# Patient Record
Sex: FEMALE | Race: WHITE | NOT HISPANIC OR LATINO
[De-identification: names, ages, dates, MRNs, and addresses within clinical notes are randomized per-mention and may not be internally consistent; named-entity substitution may affect disease eponyms.]

---

## 2017-01-30 ENCOUNTER — APPOINTMENT (OUTPATIENT)
Dept: UROLOGY | Facility: CLINIC | Age: 82
End: 2017-01-30

## 2017-03-04 ENCOUNTER — RX RENEWAL (OUTPATIENT)
Age: 82
End: 2017-03-04

## 2017-03-08 ENCOUNTER — APPOINTMENT (OUTPATIENT)
Dept: UROLOGY | Facility: CLINIC | Age: 82
End: 2017-03-08

## 2017-03-08 VITALS — HEART RATE: 69 BPM | DIASTOLIC BLOOD PRESSURE: 74 MMHG | SYSTOLIC BLOOD PRESSURE: 171 MMHG | TEMPERATURE: 98 F

## 2017-03-08 DIAGNOSIS — N39.41 URGE INCONTINENCE: ICD-10-CM

## 2017-03-09 LAB
APPEARANCE: CLEAR
BACTERIA: ABNORMAL
BILIRUBIN URINE: NEGATIVE
BLOOD URINE: NEGATIVE
COLOR: ABNORMAL
CORE LAB FLUID CYTOLOGY: NORMAL
GLUCOSE QUALITATIVE U: NORMAL MG/DL
HYALINE CASTS: 2 /LPF
KETONES URINE: NEGATIVE
LEUKOCYTE ESTERASE URINE: ABNORMAL
MICROSCOPIC-UA: NORMAL
NITRITE URINE: NEGATIVE
PH URINE: 7
PROTEIN URINE: NEGATIVE MG/DL
RED BLOOD CELLS URINE: 1 /HPF
SPECIFIC GRAVITY URINE: 1.01
SQUAMOUS EPITHELIAL CELLS: 12 /HPF
UROBILINOGEN URINE: NORMAL MG/DL
WHITE BLOOD CELLS URINE: 6 /HPF

## 2017-03-10 LAB — BACTERIA UR CULT: ABNORMAL

## 2017-04-18 ENCOUNTER — APPOINTMENT (OUTPATIENT)
Dept: FAMILY MEDICINE | Facility: CLINIC | Age: 82
End: 2017-04-18

## 2017-04-18 VITALS
DIASTOLIC BLOOD PRESSURE: 90 MMHG | WEIGHT: 163.5 LBS | HEIGHT: 62.5 IN | BODY MASS INDEX: 29.33 KG/M2 | SYSTOLIC BLOOD PRESSURE: 164 MMHG

## 2017-04-18 VITALS — DIASTOLIC BLOOD PRESSURE: 80 MMHG | SYSTOLIC BLOOD PRESSURE: 140 MMHG

## 2017-04-18 RX ORDER — OMEPRAZOLE 40 MG/1
40 CAPSULE, DELAYED RELEASE ORAL
Refills: 0 | Status: COMPLETED | COMMUNITY

## 2017-04-18 RX ORDER — METOPROLOL TARTRATE 25 MG/1
25 TABLET, FILM COATED ORAL
Refills: 0 | Status: COMPLETED | COMMUNITY

## 2017-04-18 RX ORDER — RANITIDINE HYDROCHLORIDE 300 MG/1
300 CAPSULE ORAL
Refills: 0 | Status: COMPLETED | COMMUNITY

## 2017-04-19 LAB
B PERT IGG+IGM PNL SER: ABNORMAL
COLOR FLD: NORMAL
FLUID INTAKE SUBSTANCE CLASS: NORMAL
LYMPHOCYTES # FLD MANUAL: 11 %
MONOS+MACROS NFR FLD MANUAL: 18 %
NEUTS SEG # FLD MANUAL: 71 %
RBC # FLD MANUAL: ABNORMAL /UL
TOTAL CELLS COUNTED FLD: 9230 /UL
TUBE TYPE: NORMAL

## 2017-04-25 LAB — BACTERIA FLD CULT: NORMAL

## 2017-06-01 ENCOUNTER — RX RENEWAL (OUTPATIENT)
Age: 82
End: 2017-06-01

## 2017-09-26 ENCOUNTER — NON-APPOINTMENT (OUTPATIENT)
Age: 82
End: 2017-09-26

## 2017-09-26 ENCOUNTER — APPOINTMENT (OUTPATIENT)
Dept: FAMILY MEDICINE | Facility: CLINIC | Age: 82
End: 2017-09-26
Payer: MEDICARE

## 2017-09-26 VITALS
HEART RATE: 70 BPM | HEIGHT: 62.5 IN | DIASTOLIC BLOOD PRESSURE: 80 MMHG | RESPIRATION RATE: 16 BRPM | SYSTOLIC BLOOD PRESSURE: 152 MMHG | WEIGHT: 163.13 LBS | BODY MASS INDEX: 29.27 KG/M2 | OXYGEN SATURATION: 97 %

## 2017-09-26 VITALS — RESPIRATION RATE: 16 BRPM | DIASTOLIC BLOOD PRESSURE: 80 MMHG | SYSTOLIC BLOOD PRESSURE: 140 MMHG | HEART RATE: 72 BPM

## 2017-09-26 DIAGNOSIS — R26.2 DIFFICULTY IN WALKING, NOT ELSEWHERE CLASSIFIED: ICD-10-CM

## 2017-09-26 DIAGNOSIS — R12 HEARTBURN: ICD-10-CM

## 2017-09-26 DIAGNOSIS — M67.441 GANGLION, RIGHT HAND: ICD-10-CM

## 2017-09-26 DIAGNOSIS — Z01.818 ENCOUNTER FOR OTHER PREPROCEDURAL EXAMINATION: ICD-10-CM

## 2017-09-26 DIAGNOSIS — R35.1 NOCTURIA: ICD-10-CM

## 2017-09-26 DIAGNOSIS — F17.200 NICOTINE DEPENDENCE, UNSPECIFIED, UNCOMPLICATED: ICD-10-CM

## 2017-09-26 DIAGNOSIS — R32 UNSPECIFIED URINARY INCONTINENCE: ICD-10-CM

## 2017-09-26 DIAGNOSIS — Z78.9 OTHER SPECIFIED HEALTH STATUS: ICD-10-CM

## 2017-09-26 DIAGNOSIS — H04.123 DRY EYE SYNDROME OF BILATERAL LACRIMAL GLANDS: ICD-10-CM

## 2017-09-26 PROCEDURE — 99214 OFFICE O/P EST MOD 30 MIN: CPT | Mod: 25

## 2017-09-26 PROCEDURE — 93000 ELECTROCARDIOGRAM COMPLETE: CPT

## 2017-09-29 ENCOUNTER — RESULT REVIEW (OUTPATIENT)
Age: 82
End: 2017-09-29

## 2017-10-11 ENCOUNTER — RX RENEWAL (OUTPATIENT)
Age: 82
End: 2017-10-11

## 2017-10-13 ENCOUNTER — RX RENEWAL (OUTPATIENT)
Age: 82
End: 2017-10-13

## 2017-11-13 ENCOUNTER — NON-APPOINTMENT (OUTPATIENT)
Age: 82
End: 2017-11-13

## 2017-11-13 ENCOUNTER — APPOINTMENT (OUTPATIENT)
Dept: FAMILY MEDICINE | Facility: CLINIC | Age: 82
End: 2017-11-13
Payer: MEDICARE

## 2017-11-13 VITALS
WEIGHT: 161.38 LBS | DIASTOLIC BLOOD PRESSURE: 80 MMHG | HEIGHT: 62.5 IN | BODY MASS INDEX: 28.95 KG/M2 | SYSTOLIC BLOOD PRESSURE: 140 MMHG

## 2017-11-13 VITALS — DIASTOLIC BLOOD PRESSURE: 80 MMHG | HEART RATE: 72 BPM | SYSTOLIC BLOOD PRESSURE: 140 MMHG | RESPIRATION RATE: 16 BRPM

## 2017-11-13 DIAGNOSIS — I34.0 NONRHEUMATIC MITRAL (VALVE) INSUFFICIENCY: ICD-10-CM

## 2017-11-13 PROCEDURE — 36415 COLL VENOUS BLD VENIPUNCTURE: CPT

## 2017-11-13 PROCEDURE — 93000 ELECTROCARDIOGRAM COMPLETE: CPT

## 2017-11-13 PROCEDURE — 99214 OFFICE O/P EST MOD 30 MIN: CPT | Mod: 25

## 2017-11-14 LAB
ALBUMIN SERPL ELPH-MCNC: 4.2 G/DL
ALP BLD-CCNC: 88 U/L
ALT SERPL-CCNC: 7 U/L
ANION GAP SERPL CALC-SCNC: 15 MMOL/L
APPEARANCE: CLEAR
AST SERPL-CCNC: 21 U/L
BACTERIA: ABNORMAL
BASOPHILS # BLD AUTO: 0.05 K/UL
BASOPHILS NFR BLD AUTO: 0.6 %
BILIRUB SERPL-MCNC: 0.3 MG/DL
BILIRUBIN URINE: NEGATIVE
BLOOD URINE: ABNORMAL
BUN SERPL-MCNC: 11 MG/DL
CALCIUM SERPL-MCNC: 9.6 MG/DL
CHLORIDE SERPL-SCNC: 95 MMOL/L
CHOLEST SERPL-MCNC: 228 MG/DL
CHOLEST/HDLC SERPL: 4.7 RATIO
CO2 SERPL-SCNC: 24 MMOL/L
COLOR: YELLOW
CREAT SERPL-MCNC: 0.95 MG/DL
CRP SERPL-MCNC: 1.2 MG/DL
EOSINOPHIL # BLD AUTO: 0.42 K/UL
EOSINOPHIL NFR BLD AUTO: 5 %
ERYTHROCYTE [SEDIMENTATION RATE] IN BLOOD BY WESTERGREN METHOD: 44 MM/HR
GLUCOSE QUALITATIVE U: NEGATIVE MG/DL
GLUCOSE SERPL-MCNC: 69 MG/DL
HCT VFR BLD CALC: 36.5 %
HDLC SERPL-MCNC: 49 MG/DL
HGB BLD-MCNC: 12.1 G/DL
HYALINE CASTS: 0 /LPF
IMM GRANULOCYTES NFR BLD AUTO: 0.1 %
KETONES URINE: NEGATIVE
LDLC SERPL CALC-MCNC: 142 MG/DL
LEUKOCYTE ESTERASE URINE: ABNORMAL
LYMPHOCYTES # BLD AUTO: 1.59 K/UL
LYMPHOCYTES NFR BLD AUTO: 19.1 %
MAN DIFF?: NORMAL
MCHC RBC-ENTMCNC: 30 PG
MCHC RBC-ENTMCNC: 33.2 GM/DL
MCV RBC AUTO: 90.3 FL
MICROSCOPIC-UA: NORMAL
MONOCYTES # BLD AUTO: 0.77 K/UL
MONOCYTES NFR BLD AUTO: 9.2 %
NEUTROPHILS # BLD AUTO: 5.49 K/UL
NEUTROPHILS NFR BLD AUTO: 66 %
NITRITE URINE: NEGATIVE
PH URINE: 6
PLATELET # BLD AUTO: 345 K/UL
POTASSIUM SERPL-SCNC: 4.7 MMOL/L
PROT SERPL-MCNC: 7.6 G/DL
PROTEIN URINE: NEGATIVE MG/DL
RBC # BLD: 4.04 M/UL
RBC # FLD: 13.7 %
RED BLOOD CELLS URINE: 2 /HPF
SODIUM SERPL-SCNC: 134 MMOL/L
SPECIFIC GRAVITY URINE: 1.01
SQUAMOUS EPITHELIAL CELLS: >27 /HPF
TRIGL SERPL-MCNC: 184 MG/DL
URATE SERPL-MCNC: 4.6 MG/DL
UROBILINOGEN URINE: NEGATIVE MG/DL
WBC # FLD AUTO: 8.33 K/UL
WHITE BLOOD CELLS URINE: 6 /HPF

## 2017-11-15 ENCOUNTER — APPOINTMENT (OUTPATIENT)
Dept: CARDIOLOGY | Facility: CLINIC | Age: 82
End: 2017-11-15
Payer: MEDICARE

## 2017-11-15 VITALS
SYSTOLIC BLOOD PRESSURE: 146 MMHG | BODY MASS INDEX: 28.95 KG/M2 | WEIGHT: 161.38 LBS | HEART RATE: 69 BPM | DIASTOLIC BLOOD PRESSURE: 82 MMHG | OXYGEN SATURATION: 96 % | HEIGHT: 62.5 IN

## 2017-11-15 DIAGNOSIS — R06.09 OTHER FORMS OF DYSPNEA: ICD-10-CM

## 2017-11-15 LAB
T4 SERPL-MCNC: 7.9 UG/DL
TSH SERPL-ACNC: 13.58 UIU/ML

## 2017-11-15 PROCEDURE — 99205 OFFICE O/P NEW HI 60 MIN: CPT

## 2017-11-15 PROCEDURE — 93306 TTE W/DOPPLER COMPLETE: CPT

## 2017-11-24 ENCOUNTER — TRANSCRIPTION ENCOUNTER (OUTPATIENT)
Age: 82
End: 2017-11-24

## 2017-11-25 ENCOUNTER — TRANSCRIPTION ENCOUNTER (OUTPATIENT)
Age: 82
End: 2017-11-25

## 2017-12-01 ENCOUNTER — RESULT REVIEW (OUTPATIENT)
Age: 82
End: 2017-12-01

## 2017-12-05 ENCOUNTER — APPOINTMENT (OUTPATIENT)
Dept: CARDIOLOGY | Facility: CLINIC | Age: 82
End: 2017-12-05
Payer: MEDICARE

## 2017-12-05 ENCOUNTER — NON-APPOINTMENT (OUTPATIENT)
Age: 82
End: 2017-12-05

## 2017-12-05 VITALS
BODY MASS INDEX: 27.81 KG/M2 | HEART RATE: 61 BPM | HEIGHT: 62.5 IN | OXYGEN SATURATION: 98 % | DIASTOLIC BLOOD PRESSURE: 74 MMHG | SYSTOLIC BLOOD PRESSURE: 156 MMHG | WEIGHT: 155 LBS

## 2017-12-05 DIAGNOSIS — I50.1 LEFT VENTRICULAR FAILURE, UNSPECIFIED: ICD-10-CM

## 2017-12-05 PROCEDURE — 99203 OFFICE O/P NEW LOW 30 MIN: CPT

## 2017-12-05 PROCEDURE — 93000 ELECTROCARDIOGRAM COMPLETE: CPT

## 2017-12-12 ENCOUNTER — RX RENEWAL (OUTPATIENT)
Age: 82
End: 2017-12-12

## 2017-12-13 PROBLEM — R06.09 DOE (DYSPNEA ON EXERTION): Status: ACTIVE | Noted: 2017-12-13

## 2018-01-08 ENCOUNTER — OUTPATIENT (OUTPATIENT)
Dept: OUTPATIENT SERVICES | Facility: HOSPITAL | Age: 83
LOS: 1 days | End: 2018-01-08
Payer: MEDICARE

## 2018-01-08 VITALS
TEMPERATURE: 98 F | SYSTOLIC BLOOD PRESSURE: 159 MMHG | OXYGEN SATURATION: 98 % | HEART RATE: 91 BPM | RESPIRATION RATE: 18 BRPM | DIASTOLIC BLOOD PRESSURE: 74 MMHG | HEIGHT: 62 IN | WEIGHT: 154.98 LBS

## 2018-01-08 DIAGNOSIS — Z90.89 ACQUIRED ABSENCE OF OTHER ORGANS: Chronic | ICD-10-CM

## 2018-01-08 DIAGNOSIS — H26.9 UNSPECIFIED CATARACT: Chronic | ICD-10-CM

## 2018-01-08 DIAGNOSIS — Z90.49 ACQUIRED ABSENCE OF OTHER SPECIFIED PARTS OF DIGESTIVE TRACT: Chronic | ICD-10-CM

## 2018-01-08 DIAGNOSIS — Z01.810 ENCOUNTER FOR PREPROCEDURAL CARDIOVASCULAR EXAMINATION: ICD-10-CM

## 2018-01-08 DIAGNOSIS — I35.0 NONRHEUMATIC AORTIC (VALVE) STENOSIS: ICD-10-CM

## 2018-01-08 DIAGNOSIS — Z98.890 OTHER SPECIFIED POSTPROCEDURAL STATES: Chronic | ICD-10-CM

## 2018-01-08 LAB
ANION GAP SERPL CALC-SCNC: 13 MMOL/L — SIGNIFICANT CHANGE UP (ref 5–17)
APTT BLD: 28.9 SEC — SIGNIFICANT CHANGE UP (ref 27.5–37.4)
BASOPHILS # BLD AUTO: 0 K/UL — SIGNIFICANT CHANGE UP (ref 0–0.2)
BASOPHILS NFR BLD AUTO: 0.3 % — SIGNIFICANT CHANGE UP (ref 0–2)
BUN SERPL-MCNC: 12 MG/DL — SIGNIFICANT CHANGE UP (ref 8–20)
CALCIUM SERPL-MCNC: 9.5 MG/DL — SIGNIFICANT CHANGE UP (ref 8.6–10.2)
CHLORIDE SERPL-SCNC: 97 MMOL/L — LOW (ref 98–107)
CO2 SERPL-SCNC: 24 MMOL/L — SIGNIFICANT CHANGE UP (ref 22–29)
CREAT SERPL-MCNC: 0.74 MG/DL — SIGNIFICANT CHANGE UP (ref 0.5–1.3)
EOSINOPHIL # BLD AUTO: 0.1 K/UL — SIGNIFICANT CHANGE UP (ref 0–0.5)
EOSINOPHIL NFR BLD AUTO: 1.4 % — SIGNIFICANT CHANGE UP (ref 0–6)
GLUCOSE SERPL-MCNC: 95 MG/DL — SIGNIFICANT CHANGE UP (ref 70–115)
HCT VFR BLD CALC: 35.9 % — LOW (ref 37–47)
HGB BLD-MCNC: 12.2 G/DL — SIGNIFICANT CHANGE UP (ref 12–16)
INR BLD: 0.95 RATIO — SIGNIFICANT CHANGE UP (ref 0.88–1.16)
LYMPHOCYTES # BLD AUTO: 1 K/UL — SIGNIFICANT CHANGE UP (ref 1–4.8)
LYMPHOCYTES # BLD AUTO: 14.3 % — LOW (ref 20–55)
MCHC RBC-ENTMCNC: 29.5 PG — SIGNIFICANT CHANGE UP (ref 27–31)
MCHC RBC-ENTMCNC: 34 G/DL — SIGNIFICANT CHANGE UP (ref 32–36)
MCV RBC AUTO: 86.7 FL — SIGNIFICANT CHANGE UP (ref 81–99)
MONOCYTES # BLD AUTO: 0.6 K/UL — SIGNIFICANT CHANGE UP (ref 0–0.8)
MONOCYTES NFR BLD AUTO: 8.5 % — SIGNIFICANT CHANGE UP (ref 3–10)
NEUTROPHILS # BLD AUTO: 5.5 K/UL — SIGNIFICANT CHANGE UP (ref 1.8–8)
NEUTROPHILS NFR BLD AUTO: 75.4 % — HIGH (ref 37–73)
PLATELET # BLD AUTO: 281 K/UL — SIGNIFICANT CHANGE UP (ref 150–400)
POTASSIUM SERPL-MCNC: 4.3 MMOL/L — SIGNIFICANT CHANGE UP (ref 3.5–5.3)
POTASSIUM SERPL-SCNC: 4.3 MMOL/L — SIGNIFICANT CHANGE UP (ref 3.5–5.3)
PROTHROM AB SERPL-ACNC: 10.4 SEC — SIGNIFICANT CHANGE UP (ref 9.8–12.7)
RBC # BLD: 4.14 M/UL — LOW (ref 4.4–5.2)
RBC # FLD: 13.3 % — SIGNIFICANT CHANGE UP (ref 11–15.6)
SODIUM SERPL-SCNC: 134 MMOL/L — LOW (ref 135–145)
WBC # BLD: 7.3 K/UL — SIGNIFICANT CHANGE UP (ref 4.8–10.8)
WBC # FLD AUTO: 7.3 K/UL — SIGNIFICANT CHANGE UP (ref 4.8–10.8)

## 2018-01-08 PROCEDURE — 93010 ELECTROCARDIOGRAM REPORT: CPT

## 2018-01-08 PROCEDURE — 93005 ELECTROCARDIOGRAM TRACING: CPT

## 2018-01-08 PROCEDURE — 85730 THROMBOPLASTIN TIME PARTIAL: CPT

## 2018-01-08 PROCEDURE — 85610 PROTHROMBIN TIME: CPT

## 2018-01-08 PROCEDURE — G0463: CPT

## 2018-01-08 PROCEDURE — 36415 COLL VENOUS BLD VENIPUNCTURE: CPT

## 2018-01-08 PROCEDURE — 85027 COMPLETE CBC AUTOMATED: CPT

## 2018-01-08 PROCEDURE — 80048 BASIC METABOLIC PNL TOTAL CA: CPT

## 2018-01-08 NOTE — H&P PST ADULT - PSH
Bilateral cataracts    H/O brain surgery  subdural  bleed,  had  chris holes  History of cholecystectomy    History of tonsillectomy

## 2018-01-08 NOTE — ASU PATIENT PROFILE, ADULT - PMH
Actinic keratosis    Aortic valve stenosis    Back pain    Dehydration    Dry eyes    Fatigue    GERD (gastroesophageal reflux disease)    Hard of hearing    HLD (hyperlipidemia)    HTN (hypertension)    Hypothyroid    Intraventricular block    MR (mitral regurgitation)    Nocturia    Osteoarthritis of back    Subdural hematoma    Syncope and collapse    Urine incontinence

## 2018-01-08 NOTE — H&P PST ADULT - FALL HARM RISK
has adaptive equipment in home, taught back well understanding of home safety/bones(Osteoporosis,prev fx,steroid use,metastatic bone ca

## 2018-01-08 NOTE — ASU PATIENT PROFILE, ADULT - FALL HARM RISK
bones(Osteoporosis,prev fx,steroid use,metastatic bone ca/has adaptive equipment in home, taught back well understanding of home safety

## 2018-01-08 NOTE — H&P PST ADULT - HISTORY OF PRESENT ILLNESS
85 yo female with murmur since childhood recently diagnosed with severe aortic stenosis.  Complaints of lethargy, SOB NYHA 3/4 denies chest pain. Dr Ford  referred for TAVR consult.  Echo revealed moderate MR and severe AS EDUIN +1cm2.  History also includes hypertension, hyperlipidemia, hypothyroidism traumatic subarachnoid hemorrhage after a fall in 2015 which required drainage.  Patient presents for PST for cardiac cath to r/o CAD for TAVR clearance.

## 2018-01-11 ENCOUNTER — TRANSCRIPTION ENCOUNTER (OUTPATIENT)
Age: 83
End: 2018-01-11

## 2018-01-11 ENCOUNTER — OUTPATIENT (OUTPATIENT)
Dept: OUTPATIENT SERVICES | Facility: HOSPITAL | Age: 83
LOS: 1 days | Discharge: HOME | End: 2018-01-11
Payer: MEDICARE

## 2018-01-11 VITALS
HEART RATE: 71 BPM | TEMPERATURE: 98 F | OXYGEN SATURATION: 98 % | RESPIRATION RATE: 20 BRPM | DIASTOLIC BLOOD PRESSURE: 70 MMHG | SYSTOLIC BLOOD PRESSURE: 157 MMHG

## 2018-01-11 VITALS
OXYGEN SATURATION: 96 % | SYSTOLIC BLOOD PRESSURE: 145 MMHG | RESPIRATION RATE: 16 BRPM | DIASTOLIC BLOOD PRESSURE: 67 MMHG | HEART RATE: 79 BPM

## 2018-01-11 DIAGNOSIS — I25.10 ATHEROSCLEROTIC HEART DISEASE OF NATIVE CORONARY ARTERY WITHOUT ANGINA PECTORIS: ICD-10-CM

## 2018-01-11 DIAGNOSIS — Z98.890 OTHER SPECIFIED POSTPROCEDURAL STATES: Chronic | ICD-10-CM

## 2018-01-11 DIAGNOSIS — Z90.49 ACQUIRED ABSENCE OF OTHER SPECIFIED PARTS OF DIGESTIVE TRACT: Chronic | ICD-10-CM

## 2018-01-11 DIAGNOSIS — Z90.89 ACQUIRED ABSENCE OF OTHER ORGANS: Chronic | ICD-10-CM

## 2018-01-11 DIAGNOSIS — I35.0 NONRHEUMATIC AORTIC (VALVE) STENOSIS: ICD-10-CM

## 2018-01-11 DIAGNOSIS — Z01.810 ENCOUNTER FOR PREPROCEDURAL CARDIOVASCULAR EXAMINATION: ICD-10-CM

## 2018-01-11 DIAGNOSIS — E03.9 HYPOTHYROIDISM, UNSPECIFIED: ICD-10-CM

## 2018-01-11 DIAGNOSIS — H26.9 UNSPECIFIED CATARACT: Chronic | ICD-10-CM

## 2018-01-11 DIAGNOSIS — K21.9 GASTRO-ESOPHAGEAL REFLUX DISEASE WITHOUT ESOPHAGITIS: ICD-10-CM

## 2018-01-11 PROCEDURE — 93567 NJX CAR CTH SPRVLV AORTGRPHY: CPT

## 2018-01-11 PROCEDURE — C1769: CPT

## 2018-01-11 PROCEDURE — C1887: CPT

## 2018-01-11 PROCEDURE — 93460 R&L HRT ART/VENTRICLE ANGIO: CPT

## 2018-01-11 PROCEDURE — 99221 1ST HOSP IP/OBS SF/LOW 40: CPT

## 2018-01-11 PROCEDURE — C1894: CPT

## 2018-01-11 PROCEDURE — C1889: CPT

## 2018-01-11 PROCEDURE — 93460 R&L HRT ART/VENTRICLE ANGIO: CPT | Mod: 26

## 2018-01-11 RX ORDER — ATROPINE SULFATE 0.1 MG/ML
0.5 SYRINGE (ML) INJECTION ONCE
Qty: 0 | Refills: 0 | Status: COMPLETED | OUTPATIENT
Start: 2018-01-11 | End: 2018-01-11

## 2018-01-11 RX ORDER — SODIUM CHLORIDE 9 MG/ML
1000 INJECTION INTRAMUSCULAR; INTRAVENOUS; SUBCUTANEOUS
Qty: 0 | Refills: 0 | Status: DISCONTINUED | OUTPATIENT
Start: 2018-01-11 | End: 2018-01-26

## 2018-01-11 RX ORDER — SODIUM CHLORIDE 9 MG/ML
300 INJECTION INTRAMUSCULAR; INTRAVENOUS; SUBCUTANEOUS
Qty: 0 | Refills: 0 | Status: DISCONTINUED | OUTPATIENT
Start: 2018-01-11 | End: 2018-01-26

## 2018-01-11 RX ADMIN — Medication 0.5 MILLIGRAM(S): at 13:15

## 2018-01-11 RX ADMIN — SODIUM CHLORIDE 300 MILLILITER(S): 9 INJECTION INTRAMUSCULAR; INTRAVENOUS; SUBCUTANEOUS at 16:40

## 2018-01-11 NOTE — CONSULT NOTE ADULT - ASSESSMENT
87 yo Female with PMH HTN, HLD, Hypothyroid, Traumatic SAH 2015. Pt had a recent TTE with evidence of severe AS mean AV gradient of 43mmHg, Mild AI, MAC and moderate MR. Pt c/o SOB with exertion and lethargy for several weeks now. Pt underwent Cardiac Cath today which showed 80% stenosis of LAD and Severe AS with a valve gradient of 60mmHg. Pt to be evaluated for TAVR work up and possible PCI stent to LAD.   Case discussed with Dr. Castillo Pt will need a TAVR most likely. Pt to be evaluated by Dr. dave in office on 1/18/18 at 12:30pm. Pt given appointment and explained if her SOB worsens or she begins to have chest pain or Syncope/light handedness she is to come in to ED immediately

## 2018-01-11 NOTE — CONSULT NOTE ADULT - SUBJECTIVE AND OBJECTIVE BOX
Surgeon: Jaelyn    Consult requesting by: Dr He     HISTORY OF PRESENT ILLNESS:  87 yo Female with PMH HTN, HLD, Hypothyroid, Traumatic SAH 2015. Pt had a recent TTE with evidence of severe AS and moderate MR. Pt c/o SOB and lethargy. Pt underwent Cardiac Cath today which showed 80% stenosis of LAD and Severe AS with a gradient of 60mmHg.     PAST MEDICAL & SURGICAL HISTORY:  Osteoarthritis of back  Urine incontinence  Nocturia  MR (mitral regurgitation)  Hypothyroid  Dehydration  Syncope and collapse  GERD (gastroesophageal reflux disease)  HTN (hypertension)  HLD (hyperlipidemia)  Fatigue  Dry eyes  Subdural hematoma  Hard of hearing  Back pain  Aortic valve stenosis  Actinic keratosis  Intraventricular block  History of cholecystectomy  History of tonsillectomy  Bilateral cataracts  H/O brain surgery: subdural  bleed,  had  chris holes      MEDICATIONS  (STANDING):  atropine Syringe 0.5 milliGRAM(s) IV Push once  sodium chloride 0.9%. 1000 milliLiter(s) (30 mL/Hr) IV Continuous <Continuous>  sodium chloride 0.9%. 300 milliLiter(s) (300 mL/Hr) IV Continuous <Continuous>    MEDICATIONS  (PRN):    Antiplatelet therapy:                           Last dose/amt:    Allergies    No Known Allergies    Intolerances        SOCIAL HISTORY:  Smoker: [ ] Yes  [ ] No        PACK YEARS:                         WHEN QUIT?  ETOH use: [ ] Yes  [ ] No              FREQUENCY / QUANTITY:  Ilicit Drug use:  [ ] Yes  [ ] No  Occupation:  Live with:  Assisted device use:    FAMILY HISTORY:      Review of Systems  CONSTITUTIONAL:  Fevers[ ] chills[ ] sweats[ ] fatigue[ ] weight loss[ ] weight gain [ ]                                     NEGATIVE [ ]   NEURO:  parathesias[ ] seizures [ ]  syncope [ ]  confusion [ ]                                                                                NEGATIVE[ ]   EYES: glasses[ ]  blurry vision[ ]  discharge[ ] pain[ ] glaucoma [ ]                                                                          NEGATIVE[ ]   ENMT:  difficulty hearing [ ]  vertigo[ ]  dysphagia[ ] epistaxis[ ] recent dental work [ ]                                    NEGATIVE[ ]   CV:  chest pain[ ] palpitations[ ] WRIGHT [ ] diaphoresis [ ]                                                                                           NEGATIVE[ ]   RESPIRATORY:  wheezing[ ] SOB[ ] cough [ ] sputum[ ] hemoptysis[ ]                                                                  NEGATIVE[ ]   GI:  nausea[ ]  vommiting [ ]  diarrhea[ ] constipation [ ] melena [ ]                                                                      NEGATIVE[ ]   : hematuria[ ]  dysuria[ ] urgency[ ] incontinence[ ]                                                                                            NEGATIVE[ ]   MUSKULOSKELETAL:  arthritis[ ]  joint swelling [ ] muscle weakness [ ]                                                                NEGATIVE[ ]   SKIN/BREAST:  rash[ ] itching [ ]  hair loss[ ] masses[ ]                                                                                              NEGATIVE[ ]   PSYCH:  dementia [ ] depresion [ ] anxiety[ ]                                                                                                               NEGATIVE[ ]   HEME/LYMPH:  bruises easily[ ] enlarged lymph nodes[ ] tender lymph nodes[ ]                                               NEGATIVE[ ]   ENDOCRINE:  cold intolerance[ ] heat intolerance[ ] polydipsia[ ]                                                                          NEGATIVE[ ]     PHYSICAL EXAM  Vital Signs Last 24 Hrs  T(C): 36.7 (11 Jan 2018 10:37), Max: 36.7 (11 Jan 2018 10:37)  T(F): 98 (11 Jan 2018 10:37), Max: 98 (11 Jan 2018 10:37)  HR: 79 (11 Jan 2018 15:30) (54 - 80)  BP: 152/767 (11 Jan 2018 15:30) (68/43 - 167/77)  BP(mean): --  RR: 16 (11 Jan 2018 15:30) (16 - 20)  SpO2: 95% (11 Jan 2018 15:30) (93% - 98%)    CONSTITUTIONAL:                                                                          WNL[ ]   Neuro: WNL[ ] Normal exam oriented to person/place & time with no focal motor or sensory  deficits. Other __________                    Eyes: WNL[ ]   Normal exam of conjunctiva & lids, pupils equally reactive. Other______________________________     ENT: WNL[ ]    Normal exam of nasal/oral mucosa with absence of cyanosis. Other_____________________________  Neck: WNL[ ]  Normal exam of jugular veins, trachea & thyroid. Other_________________________________________  Chest: WNL[ ] Normal lung exam with good air movement absence of wheezes, rales, or rhonchi: Other_________________________________________                                                                                CV:  Auscultation: normal [ ] S3[ ] S4[ ] Irregular [ ] Rub[ ] Clicks[ ]    Murmurs none:[ ]systolic [ ]  diastolic [ ] holosystolic [ ]  Carotids: No Bruits[ ] Other____________ Abdominal Aorta: normal [ ] nonpalpable[ ]Other___________                                                                                      GI: WNL[ ] Normal exam of abdomen, liver & spleen with no noted masses or tenderness. Other______________________                                                                                                        Extremities: WNL[ ] Normal no evidence of cyanosis or deformity Edema: none[ ]trace[ ]1+[ ]2+[ ]3+[ ]4+[ ]  Lower Extremity Pulses: Right[ ] Left[ ]Varicosities[ ]  SKIN :WNL[ ] Normal exam to inspection & palation. Other:____________________                                                          LABS:                          Cardiac Cath:    TTE / MINNIE:      Assessment:          Plan: Surgeon: Jaelyn    Consult requesting by: Dr He     HISTORY OF PRESENT ILLNESS:  87 yo Female with PMH HTN, HLD, Hypothyroid, Traumatic SAH 2015. Pt had a recent TTE with evidence of severe AS and moderate MR. Pt c/o SOB and lethargy. Pt underwent Cardiac Cath today which showed 80% stenosis of LAD and Severe AS with a gradient of 60mmHg.     PAST MEDICAL & SURGICAL HISTORY:  Osteoarthritis of back  Urine incontinence  Nocturia  MR (mitral regurgitation)  Hypothyroid  Dehydration  Syncope and collapse  GERD (gastroesophageal reflux disease)  HTN (hypertension)  HLD (hyperlipidemia)  Fatigue  Dry eyes  Subdural hematoma  Hard of hearing  Back pain  Aortic valve stenosis  Actinic keratosis  Intraventricular block  History of cholecystectomy  History of tonsillectomy  Bilateral cataracts  H/O brain surgery: subdural  bleed,  had  chris holes      MEDICATIONS  (STANDING):  atropine Syringe 0.5 milliGRAM(s) IV Push once  sodium chloride 0.9%. 1000 milliLiter(s) (30 mL/Hr) IV Continuous <Continuous>  sodium chloride 0.9%. 300 milliLiter(s) (300 mL/Hr) IV Continuous <Continuous>    MEDICATIONS  (PRN):    Antiplatelet therapy:                           Last dose/amt:    Allergies    No Known Allergies    Intolerances        SOCIAL HISTORY:  Smoker: [ ] Yes  [ x ] No        PACK YEARS:                         WHEN QUIT?  ETOH use: [ ] Yes  [ x ] No              FREQUENCY / QUANTITY:  Ilicit Drug use:  [ ] Yes  [ x ] No  Occupation: retired  Live with: self at home  Assisted device use: none    FAMILY HISTORY:      Review of Systems  CONSTITUTIONAL:  Fevers[ ] chills[ ] sweats[ ] fatigue[ ] weight loss[ ] weight gain [ ]                                     NEGATIVE [x ]   NEURO:  parathesias[ ] seizures [ ]  syncope [ ]  confusion [ ]                                                                                NEGATIVE[ x ]   EYES: glasses[ ]  blurry vision[ ]  discharge[ ] pain[ ] glaucoma [ ]                                                                          NEGATIVE[ x ]   ENMT:  difficulty hearing [ ]  vertigo[ ]  dysphagia[ ] epistaxis[ ] recent dental work [ ]                                    NEGATIVE[ x ]   CV:  chest pain[ ] palpitations[ ] WRIGHT [ x ] diaphoresis [ ]                                                                                           NEGATIVE[ ]   RESPIRATORY:  wheezing[ ] SOB[ x ] cough [ ] sputum[ ] hemoptysis[ ]                                                                  NEGATIVE[ ]   GI:  nausea[ ]  vommiting [ ]  diarrhea[ ] constipation [ ] melena [ ]                                                                      NEGATIVE[ x ]   : hematuria[ ]  dysuria[ ] urgency[ ] incontinence[ ]                                                                                            NEGATIVE [ x ]   MUSKULOSKELETAL:  arthritis [  x ]  joint swelling [ ] muscle weakness [ ]                                                                NEGATIVE[ ]   SKIN/BREAST:  rash[ ] itching [ ]  hair loss[ ] masses[ ]                                                                                              NEGATIVE[ ]   PSYCH:  dementia [ ] depresion [ ] anxiety[ ]                                                                                                               NEGATIVE[ x ]   HEME/LYMPH:  bruises easily[ ] enlarged lymph nodes[ ] tender lymph nodes[ ]                                               NEGATIVE [ x ]   ENDOCRINE:  cold intolerance[ ] heat intolerance[ ] polydipsia[ ]                                                                          NEGATIVE [ x ]     PHYSICAL EXAM  Vital Signs Last 24 Hrs  T(C): 36.7 (11 Jan 2018 10:37), Max: 36.7 (11 Jan 2018 10:37)  T(F): 98 (11 Jan 2018 10:37), Max: 98 (11 Jan 2018 10:37)  HR: 79 (11 Jan 2018 15:30) (54 - 80)  BP: 152/767 (11 Jan 2018 15:30) (68/43 - 167/77)  BP(mean): --  RR: 16 (11 Jan 2018 15:30) (16 - 20)  SpO2: 95% (11 Jan 2018 15:30) (93% - 98%)    CONSTITUTIONAL:                                                                          WNL[ ]   Neuro: WNL[ ] Normal exam oriented to person/place & time with no focal motor or sensory  deficits. Other __________                    Eyes: WNL[ ]   Normal exam of conjunctiva & lids, pupils equally reactive. Other______________________________     ENT: WNL[ ]    Normal exam of nasal/oral mucosa with absence of cyanosis. Other_____________________________  Neck: WNL[ ]  Normal exam of jugular veins, trachea & thyroid. Other_________________________________________  Chest: WNL[ ] Normal lung exam with good air movement absence of wheezes, rales, or rhonchi: Other_________________________________________                                                                                CV:  Auscultation: normal [ ] S3[ ] S4[ ] Irregular [ ] Rub[ ] Clicks[ ]    Murmurs none:[ ]systolic [ ]  diastolic [ ] holosystolic [ ]  Carotids: No Bruits[ ] Other____________ Abdominal Aorta: normal [ ] nonpalpable[ ]Other___________                                                                                      GI: WNL[ ] Normal exam of abdomen, liver & spleen with no noted masses or tenderness. Other______________________                                                                                                        Extremities: WNL[ ] Normal no evidence of cyanosis or deformity Edema: none[ ]trace[ ]1+[ ]2+[ ]3+[ ]4+[ ]  Lower Extremity Pulses: Right[ ] Left[ ]Varicosities[ ]  SKIN :WNL[ ] Normal exam to inspection & palation. Other:____________________                                                          LABS:                          Cardiac Cath:    TTE / MINNIE:      Assessment:          Plan: Surgeon: Jaelyn    Consult requesting by: Dr He     HISTORY OF PRESENT ILLNESS:  85 yo Female with PMH HTN, HLD, Hypothyroid, Traumatic SAH 2015. Pt had a recent TTE with evidence of severe AS mean AV gradient of 43mmHg, Mild AI, MAC and moderate MR. Pt c/o SOB with exertion and lethargy for several weeks now. Pt underwent Cardiac Cath today which showed 80% stenosis of LAD and Severe AS with a valve gradient of 60mmHg. Pt to be evaluated for TAVR work up and possible PCI stent to LAD.     PAST MEDICAL & SURGICAL HISTORY:  Osteoarthritis of back  Urine incontinence  Nocturia  MR (mitral regurgitation)  Hypothyroid  Dehydration  Syncope and collapse  GERD (gastroesophageal reflux disease)  HTN (hypertension)  HLD (hyperlipidemia)  Fatigue  Dry eyes  Subdural hematoma  Hard of hearing  Back pain  Aortic valve stenosis  Actinic keratosis  Intraventricular block  History of cholecystectomy  History of tonsillectomy  Bilateral cataracts  H/O brain surgery: subdural  bleed,  had  chris holes      MEDICATIONS  (STANDING):  atropine Syringe 0.5 milliGRAM(s) IV Push once  sodium chloride 0.9%. 1000 milliLiter(s) (30 mL/Hr) IV Continuous <Continuous>  sodium chloride 0.9%. 300 milliLiter(s) (300 mL/Hr) IV Continuous <Continuous>    MEDICATIONS  (PRN):    Antiplatelet therapy:                           Last dose/amt:    Allergies    No Known Allergies    Intolerances        SOCIAL HISTORY:  Smoker: [ ] Yes  [ x ] No        PACK YEARS:                         WHEN QUIT?  ETOH use: [ ] Yes  [ x ] No              FREQUENCY / QUANTITY:  Ilicit Drug use:  [ ] Yes  [ x ] No  Occupation: retired  Live with: self at home  Assisted device use: none    FAMILY HISTORY:      Review of Systems  CONSTITUTIONAL:  Fevers[ ] chills[ ] sweats[ ] fatigue[ ] weight loss[ ] weight gain [ ]                                     NEGATIVE [x ]   NEURO:  parathesias[ ] seizures [ ]  syncope [ ]  confusion [ ]                                                                                NEGATIVE[ x ]   EYES: glasses[ ]  blurry vision[ ]  discharge[ ] pain[ ] glaucoma [ ]                                                                          NEGATIVE[ x ]   ENMT:  difficulty hearing [ ]  vertigo[ ]  dysphagia[ ] epistaxis[ ] recent dental work [ ]                                    NEGATIVE[ x ]   CV:  chest pain[ ] palpitations[ ] WRIGHT [ x ] diaphoresis [ ]                                                                                           NEGATIVE[ ]   RESPIRATORY:  wheezing[ ] SOB[ x ] cough [ ] sputum[ ] hemoptysis[ ]                                                                  NEGATIVE[ ]   GI:  nausea[ ]  vommiting [ ]  diarrhea[ ] constipation [ ] melena [ ]                                                                      NEGATIVE[ x ]   : hematuria[ ]  dysuria[ ] urgency[ ] incontinence[ ]                                                                                            NEGATIVE [ x ]   MUSKULOSKELETAL:  arthritis [  x ]  joint swelling [ ] muscle weakness [ ]                                                                NEGATIVE[ ]   SKIN/BREAST:  rash[ ] itching [ ]  hair loss[ ] masses[ ]                                                                                              NEGATIVE[ ]   PSYCH:  dementia [ ] depresion [ ] anxiety[ ]                                                                                                               NEGATIVE[ x ]   HEME/LYMPH:  bruises easily[ ] enlarged lymph nodes[ ] tender lymph nodes[ ]                                               NEGATIVE [ x ]   ENDOCRINE:  cold intolerance[ ] heat intolerance[ ] polydipsia[ ]                                                                          NEGATIVE [ x ]     PHYSICAL EXAM  Vital Signs Last 24 Hrs  T(C): 36.7 (11 Jan 2018 10:37), Max: 36.7 (11 Jan 2018 10:37)  T(F): 98 (11 Jan 2018 10:37), Max: 98 (11 Jan 2018 10:37)  HR: 79 (11 Jan 2018 15:30) (54 - 80)  BP: 152/767 (11 Jan 2018 15:30) (68/43 - 167/77)  BP(mean): --  RR: 16 (11 Jan 2018 15:30) (16 - 20)  SpO2: 95% (11 Jan 2018 15:30) (93% - 98%)    CONSTITUTIONAL:                                                                          WNL[ ]       Neuro: AAOx3 in AND  Pulm: CTA b/l, positive breath sounds b/l  CV: RRR, harsh systolic Crescendo-Decrescendo Murmur, no JVD  GI: NT/ND, positive bowel sounds  Ext: DP 2+, UR pulses 2+, CABRERA                                                              < from: Cardiac Cath Lab - Adult (01.11.18 @ 11:03) >    VENTRICLES: There were no left ventricular global or regional wall motion  abnormalities. EF calculated by contrast ventriculography was 80 % and EF  estimated was 80 %. The left ventricle was normal in size.  VALVES: AORTIC VALVE: There was severe aortic stenosis. There was trivial  (less than 1+) aortic insufficiency. MITRAL VALVE: The mitral valve  exhibited mild regurgitation.  CORONARY VESSELS: The coronary circulation is right dominant.  LM:   --  LM: Normal. The vessel was normal sized and not tortuous.  Angiography showed minor luminal irregularities with no flow limiting  lesions.  LAD:   --  LAD: Normal. The vessel was normal sized, moderately calcified,  and moderately tortuous. Angiography showed moderate atherosclerosis.  There was a discrete 80 % stenosis in the middle third of the vessel  segment, just before S2, just after S1.  CX:   --  Circumflex: Normal. The vessel was normal sized, mildly  calcified, and moderately tortuous. Angiography showed mild  atherosclerosis with no flow limiting lesions.  RCA:   --  RCA: Normal. The vessel was normal sized and moderately  tortuous. Angiography showed minor luminal irregularities with no flow  limiting lesions.  AORTA: The root exhibited normal size. Ascending aorta: The segment was  normal in size with no tortuosity.    < end of copied text >

## 2018-01-11 NOTE — DISCHARGE NOTE ADULT - NS AS ACTIVITY OBS
Walking-Indoors allowed/Do not make important decisions/Stairs allowed/Do not drive or operate machinery/Walking-Outdoors allowed/Showering allowed/No Heavy lifting/straining

## 2018-01-11 NOTE — PROGRESS NOTE ADULT - SUBJECTIVE AND OBJECTIVE BOX
Pre Cath Note    86y Female with murmur since childhood recently diagnosed with severe aortic stenosis.  Complaints of lethargy, SOB NYHA 3/4 denies chest pain. Dr Ford  referred for TAVR consult.  Echo revealed moderate MR and severe AS EDUIN +1cm2.  History also includes hypertension, hyperlipidemia, hypothyroidism traumatic subarachnoid hemorrhage after a fall in 2015 which required drainage.      Planned Procedure:RHC/LHC    Pertinent Pre-procedure Medications:          Pre-Procedural Orders:     ASA: 3  Mallampati: 2    Allergies    No Known Allergies    Intolerances        PAST MEDICAL & SURGICAL HISTORY:  Osteoarthritis of back  Urine incontinence  Nocturia  MR (mitral regurgitation)  Hypothyroid  Dehydration  Syncope and collapse  GERD (gastroesophageal reflux disease)  HTN (hypertension)  HLD (hyperlipidemia)  Fatigue  Dry eyes  Subdural hematoma  Hard of hearing  Back pain  Aortic valve stenosis  Actinic keratosis  Intraventricular block  History of cholecystectomy  History of tonsillectomy  Bilateral cataracts  H/O brain surgery: subdural  bleed,  had  chris holes                      Informed consent to be obtained by interventionalist

## 2018-01-11 NOTE — CONSULT NOTE ADULT - PROBLEM SELECTOR RECOMMENDATION 2
Pt will likely need PCI in near future  Start Asa if not already on   Continue lopressor as tolerated  start statin if pt not already on

## 2018-01-11 NOTE — DISCHARGE NOTE ADULT - CARE PROVIDER_API CALL
Cayetano He), Cardiovascular Disease; Internal Medicine; Interventional Cardiology  Phone: [phone]  Fax: [phone] Cayetano He), Cardiovascular Disease; Internal Medicine; Interventional Cardiology  Phone: [phone]  Fax: [phone]    Jose R Christy), Surgery; Thoracic [place] [address]  Phone: [phone]  Fax: [phone]

## 2018-01-11 NOTE — CONSULT NOTE ADULT - PROBLEM SELECTOR RECOMMENDATION 9
Case discussed with Dr. Castillo Pt will need a TAVR most likely. Pt to be evaluated by Dr. dave in office on 1/18/18 at 12:30pm. Pt given appointment and explained if her SOB worsens or she begins to have chest pain or Syncope/light handedness she is to come in to ED immediately   Continue lopressor at home as tolerated for Afterload reduction  avoid preload reducing agents in pt with severe AS

## 2018-01-11 NOTE — DISCHARGE NOTE ADULT - PATIENT PORTAL LINK FT
“You can access the [employer] Patient Portal, offered by [facility], by registering with the following website: [email or web address]”

## 2018-01-11 NOTE — DISCHARGE NOTE ADULT - PLAN OF CARE
Schedule for aortic valve procedure No heavy lifting, driving, sex, tub baths, swimming, or any activity that submerges the lower half of the body in water for 48 hours.  Limited walking and stairs for 48 hours.    Change the bandaid after 24 hours and every 24 hours after that.  Keep the puncture site dry and covered with a bandaid until a scab forms.    Observe the site frequently.  If bleeding or a large lump (the size of a golf ball or bigger) occurs lie flat, apply continuous direct pressure just above the puncture site for at least 10 minutes, and notify your physician immediately.  If the bleeding cannot be controlled, call 911 immediately for assistance.  Notify your physician of pain, swelling or any drainage.    Notify your physician immediately if coldness, numbness, discoloration or pain in your foot occurs.  Follow up with Dr. He in one to two weeks. PCI vs CABG Follow up with Dr. Christy as scheduled.

## 2018-01-11 NOTE — PROGRESS NOTE ADULT - SUBJECTIVE AND OBJECTIVE BOX
called by nursing for decreased HR/BP with removal of RFV/RFA sheaths  Patient w/o LOC Skin warm and dry  SBP 64  HR 50  IVF bolus and atropine 0.5 mg IVP given with good response  Patient did not have diaphoresis, NV or cp  Will monitor

## 2018-01-11 NOTE — DISCHARGE NOTE ADULT - CARE PROVIDERS DIRECT ADDRESSES
,DirectAddress_Unknown ,DirectAddress_Unknown,[email or web address]

## 2018-01-11 NOTE — DISCHARGE NOTE ADULT - CARE PLAN
Principal Discharge DX:	Aortic valve stenosis  Goal:	Schedule for aortic valve procedure  Instructions for follow-up, activity and diet:	No heavy lifting, driving, sex, tub baths, swimming, or any activity that submerges the lower half of the body in water for 48 hours.  Limited walking and stairs for 48 hours.    Change the bandaid after 24 hours and every 24 hours after that.  Keep the puncture site dry and covered with a bandaid until a scab forms.    Observe the site frequently.  If bleeding or a large lump (the size of a golf ball or bigger) occurs lie flat, apply continuous direct pressure just above the puncture site for at least 10 minutes, and notify your physician immediately.  If the bleeding cannot be controlled, call 911 immediately for assistance.  Notify your physician of pain, swelling or any drainage.    Notify your physician immediately if coldness, numbness, discoloration or pain in your foot occurs.  Follow up with Dr. He in one to two weeks.  Secondary Diagnosis:	CAD (coronary artery disease)  Goal:	PCI vs CABG Principal Discharge DX:	Aortic valve stenosis  Goal:	Schedule for aortic valve procedure  Instructions for follow-up, activity and diet:	No heavy lifting, driving, sex, tub baths, swimming, or any activity that submerges the lower half of the body in water for 48 hours.  Limited walking and stairs for 48 hours.    Change the bandaid after 24 hours and every 24 hours after that.  Keep the puncture site dry and covered with a bandaid until a scab forms.    Observe the site frequently.  If bleeding or a large lump (the size of a golf ball or bigger) occurs lie flat, apply continuous direct pressure just above the puncture site for at least 10 minutes, and notify your physician immediately.  If the bleeding cannot be controlled, call 911 immediately for assistance.  Notify your physician of pain, swelling or any drainage.    Notify your physician immediately if coldness, numbness, discoloration or pain in your foot occurs.  Follow up with Dr. He in one to two weeks.  Secondary Diagnosis:	CAD (coronary artery disease)  Goal:	PCI vs CABG  Instructions for follow-up, activity and diet:	Follow up with Dr. Christy as scheduled.

## 2018-01-17 ENCOUNTER — RECORD ABSTRACTING (OUTPATIENT)
Age: 83
End: 2018-01-17

## 2018-01-17 RX ORDER — CEPHALEXIN 500 MG/1
500 CAPSULE ORAL
Qty: 28 | Refills: 0 | Status: COMPLETED | COMMUNITY
Start: 2017-10-09

## 2018-01-17 RX ORDER — METHYLPREDNISOLONE 4 MG/1
4 TABLET ORAL
Qty: 21 | Refills: 0 | Status: COMPLETED | COMMUNITY
Start: 2017-08-15

## 2018-01-18 ENCOUNTER — APPOINTMENT (OUTPATIENT)
Dept: CARDIOTHORACIC SURGERY | Facility: CLINIC | Age: 83
End: 2018-01-18
Payer: MEDICARE

## 2018-01-18 VITALS
OXYGEN SATURATION: 98 % | WEIGHT: 155 LBS | RESPIRATION RATE: 16 BRPM | DIASTOLIC BLOOD PRESSURE: 78 MMHG | SYSTOLIC BLOOD PRESSURE: 157 MMHG | HEART RATE: 69 BPM | BODY MASS INDEX: 27.9 KG/M2

## 2018-01-18 PROCEDURE — 99213 OFFICE O/P EST LOW 20 MIN: CPT

## 2018-01-23 ENCOUNTER — APPOINTMENT (OUTPATIENT)
Dept: FAMILY MEDICINE | Facility: CLINIC | Age: 83
End: 2018-01-23
Payer: MEDICARE

## 2018-01-23 VITALS
WEIGHT: 157.38 LBS | DIASTOLIC BLOOD PRESSURE: 80 MMHG | HEIGHT: 62.5 IN | BODY MASS INDEX: 28.24 KG/M2 | SYSTOLIC BLOOD PRESSURE: 150 MMHG

## 2018-01-23 VITALS — RESPIRATION RATE: 16 BRPM | DIASTOLIC BLOOD PRESSURE: 80 MMHG | SYSTOLIC BLOOD PRESSURE: 140 MMHG | HEART RATE: 72 BPM

## 2018-01-23 PROCEDURE — 20610 DRAIN/INJ JOINT/BURSA W/O US: CPT

## 2018-01-23 PROCEDURE — 99214 OFFICE O/P EST MOD 30 MIN: CPT | Mod: 25

## 2018-01-23 RX ORDER — METHYLPRED ACET/NACL,ISO-OS/PF 40 MG/ML
40 VIAL (ML) INJECTION
Qty: 1 | Refills: 0 | Status: COMPLETED | OUTPATIENT
Start: 2018-01-23

## 2018-01-23 RX ADMIN — METHYLPREDNISOLONE ACETATE 40 MG/ML: 40 INJECTION, SUSPENSION INTRA-ARTICULAR; INTRALESIONAL; INTRAMUSCULAR; SOFT TISSUE at 00:00

## 2018-01-24 ENCOUNTER — OUTPATIENT (OUTPATIENT)
Dept: OUTPATIENT SERVICES | Facility: HOSPITAL | Age: 83
LOS: 1 days | End: 2018-01-24
Payer: MEDICARE

## 2018-01-24 DIAGNOSIS — Z90.49 ACQUIRED ABSENCE OF OTHER SPECIFIED PARTS OF DIGESTIVE TRACT: Chronic | ICD-10-CM

## 2018-01-24 DIAGNOSIS — Z98.890 OTHER SPECIFIED POSTPROCEDURAL STATES: Chronic | ICD-10-CM

## 2018-01-24 DIAGNOSIS — I35.0 NONRHEUMATIC AORTIC (VALVE) STENOSIS: ICD-10-CM

## 2018-01-24 DIAGNOSIS — H26.9 UNSPECIFIED CATARACT: Chronic | ICD-10-CM

## 2018-01-24 DIAGNOSIS — Z90.89 ACQUIRED ABSENCE OF OTHER ORGANS: Chronic | ICD-10-CM

## 2018-01-24 PROCEDURE — 71275 CT ANGIOGRAPHY CHEST: CPT

## 2018-01-24 PROCEDURE — 74174 CTA ABD&PLVS W/CONTRAST: CPT | Mod: 26

## 2018-01-24 PROCEDURE — 93306 TTE W/DOPPLER COMPLETE: CPT

## 2018-01-24 PROCEDURE — 74174 CTA ABD&PLVS W/CONTRAST: CPT

## 2018-01-24 PROCEDURE — 71275 CT ANGIOGRAPHY CHEST: CPT | Mod: 26

## 2018-01-24 PROCEDURE — 93306 TTE W/DOPPLER COMPLETE: CPT | Mod: 26

## 2018-02-05 ENCOUNTER — OUTPATIENT (OUTPATIENT)
Dept: OUTPATIENT SERVICES | Facility: HOSPITAL | Age: 83
LOS: 1 days | End: 2018-02-05
Payer: MEDICARE

## 2018-02-05 VITALS
WEIGHT: 156.53 LBS | SYSTOLIC BLOOD PRESSURE: 154 MMHG | RESPIRATION RATE: 16 BRPM | HEIGHT: 62.5 IN | DIASTOLIC BLOOD PRESSURE: 73 MMHG | HEART RATE: 69 BPM | TEMPERATURE: 97 F

## 2018-02-05 DIAGNOSIS — Z98.890 OTHER SPECIFIED POSTPROCEDURAL STATES: Chronic | ICD-10-CM

## 2018-02-05 DIAGNOSIS — Z01.818 ENCOUNTER FOR OTHER PREPROCEDURAL EXAMINATION: ICD-10-CM

## 2018-02-05 DIAGNOSIS — K21.9 GASTRO-ESOPHAGEAL REFLUX DISEASE WITHOUT ESOPHAGITIS: ICD-10-CM

## 2018-02-05 DIAGNOSIS — H26.9 UNSPECIFIED CATARACT: Chronic | ICD-10-CM

## 2018-02-05 DIAGNOSIS — E03.9 HYPOTHYROIDISM, UNSPECIFIED: ICD-10-CM

## 2018-02-05 DIAGNOSIS — I35.0 NONRHEUMATIC AORTIC (VALVE) STENOSIS: ICD-10-CM

## 2018-02-05 DIAGNOSIS — I10 ESSENTIAL (PRIMARY) HYPERTENSION: ICD-10-CM

## 2018-02-05 DIAGNOSIS — Z90.89 ACQUIRED ABSENCE OF OTHER ORGANS: Chronic | ICD-10-CM

## 2018-02-05 DIAGNOSIS — Z90.49 ACQUIRED ABSENCE OF OTHER SPECIFIED PARTS OF DIGESTIVE TRACT: Chronic | ICD-10-CM

## 2018-02-05 LAB
ALBUMIN SERPL ELPH-MCNC: 4 G/DL — SIGNIFICANT CHANGE UP (ref 3.3–5.2)
ALP SERPL-CCNC: 98 U/L — SIGNIFICANT CHANGE UP (ref 40–120)
ALT FLD-CCNC: 13 U/L — SIGNIFICANT CHANGE UP
ANION GAP SERPL CALC-SCNC: 13 MMOL/L — SIGNIFICANT CHANGE UP (ref 5–17)
APPEARANCE UR: CLEAR — SIGNIFICANT CHANGE UP
APTT BLD: 29.3 SEC — SIGNIFICANT CHANGE UP (ref 27.5–37.4)
AST SERPL-CCNC: 18 U/L — SIGNIFICANT CHANGE UP
BACTERIA # UR AUTO: ABNORMAL
BASOPHILS # BLD AUTO: 0 K/UL — SIGNIFICANT CHANGE UP (ref 0–0.2)
BASOPHILS NFR BLD AUTO: 0.5 % — SIGNIFICANT CHANGE UP (ref 0–2)
BILIRUB SERPL-MCNC: 0.4 MG/DL — SIGNIFICANT CHANGE UP (ref 0.4–2)
BILIRUB UR-MCNC: NEGATIVE — SIGNIFICANT CHANGE UP
BLD GP AB SCN SERPL QL: SIGNIFICANT CHANGE UP
BUN SERPL-MCNC: 15 MG/DL — SIGNIFICANT CHANGE UP (ref 8–20)
CALCIUM SERPL-MCNC: 9.6 MG/DL — SIGNIFICANT CHANGE UP (ref 8.6–10.2)
CHLORIDE SERPL-SCNC: 98 MMOL/L — SIGNIFICANT CHANGE UP (ref 98–107)
CO2 SERPL-SCNC: 25 MMOL/L — SIGNIFICANT CHANGE UP (ref 22–29)
COLOR SPEC: YELLOW — SIGNIFICANT CHANGE UP
CREAT SERPL-MCNC: 0.76 MG/DL — SIGNIFICANT CHANGE UP (ref 0.5–1.3)
DIFF PNL FLD: ABNORMAL
EOSINOPHIL # BLD AUTO: 0.2 K/UL — SIGNIFICANT CHANGE UP (ref 0–0.5)
EOSINOPHIL NFR BLD AUTO: 2.6 % — SIGNIFICANT CHANGE UP (ref 0–6)
EPI CELLS # UR: ABNORMAL
GLUCOSE SERPL-MCNC: 93 MG/DL — SIGNIFICANT CHANGE UP (ref 70–115)
GLUCOSE UR QL: NEGATIVE MG/DL — SIGNIFICANT CHANGE UP
HBA1C BLD-MCNC: 5.2 % — SIGNIFICANT CHANGE UP (ref 4–5.6)
HCT VFR BLD CALC: 37.7 % — SIGNIFICANT CHANGE UP (ref 37–47)
HGB BLD-MCNC: 12.3 G/DL — SIGNIFICANT CHANGE UP (ref 12–16)
INR BLD: 0.96 RATIO — SIGNIFICANT CHANGE UP (ref 0.88–1.16)
KETONES UR-MCNC: NEGATIVE — SIGNIFICANT CHANGE UP
LEUKOCYTE ESTERASE UR-ACNC: ABNORMAL
LYMPHOCYTES # BLD AUTO: 1.4 K/UL — SIGNIFICANT CHANGE UP (ref 1–4.8)
LYMPHOCYTES # BLD AUTO: 16.9 % — LOW (ref 20–55)
MCHC RBC-ENTMCNC: 28.3 PG — SIGNIFICANT CHANGE UP (ref 27–31)
MCHC RBC-ENTMCNC: 32.6 G/DL — SIGNIFICANT CHANGE UP (ref 32–36)
MCV RBC AUTO: 86.7 FL — SIGNIFICANT CHANGE UP (ref 81–99)
MONOCYTES # BLD AUTO: 0.6 K/UL — SIGNIFICANT CHANGE UP (ref 0–0.8)
MONOCYTES NFR BLD AUTO: 7.5 % — SIGNIFICANT CHANGE UP (ref 3–10)
MRSA PCR RESULT.: SIGNIFICANT CHANGE UP
NEUTROPHILS # BLD AUTO: 5.8 K/UL — SIGNIFICANT CHANGE UP (ref 1.8–8)
NEUTROPHILS NFR BLD AUTO: 72.1 % — SIGNIFICANT CHANGE UP (ref 37–73)
NITRITE UR-MCNC: NEGATIVE — SIGNIFICANT CHANGE UP
NT-PROBNP SERPL-SCNC: 789 PG/ML — HIGH (ref 0–300)
PH UR: 6 — SIGNIFICANT CHANGE UP (ref 5–8)
PLATELET # BLD AUTO: 259 K/UL — SIGNIFICANT CHANGE UP (ref 150–400)
POTASSIUM SERPL-MCNC: 4.3 MMOL/L — SIGNIFICANT CHANGE UP (ref 3.5–5.3)
POTASSIUM SERPL-SCNC: 4.3 MMOL/L — SIGNIFICANT CHANGE UP (ref 3.5–5.3)
PREALB SERPL-MCNC: 25 MG/DL — SIGNIFICANT CHANGE UP (ref 18–38)
PROT SERPL-MCNC: 7.3 G/DL — SIGNIFICANT CHANGE UP (ref 6.6–8.7)
PROT UR-MCNC: NEGATIVE MG/DL — SIGNIFICANT CHANGE UP
PROTHROM AB SERPL-ACNC: 10.6 SEC — SIGNIFICANT CHANGE UP (ref 9.8–12.7)
RBC # BLD: 4.35 M/UL — LOW (ref 4.4–5.2)
RBC # FLD: 13.4 % — SIGNIFICANT CHANGE UP (ref 11–15.6)
RBC CASTS # UR COMP ASSIST: ABNORMAL /HPF (ref 0–4)
S AUREUS DNA NOSE QL NAA+PROBE: DETECTED
SODIUM SERPL-SCNC: 136 MMOL/L — SIGNIFICANT CHANGE UP (ref 135–145)
SP GR SPEC: 1.01 — SIGNIFICANT CHANGE UP (ref 1.01–1.02)
T3 SERPL-MCNC: 63 NG/DL — LOW (ref 80–200)
T4 AB SER-ACNC: 8.2 UG/DL — SIGNIFICANT CHANGE UP (ref 4.5–12)
TSH SERPL-MCNC: 1.84 UIU/ML — SIGNIFICANT CHANGE UP (ref 0.27–4.2)
TYPE + AB SCN PNL BLD: SIGNIFICANT CHANGE UP
UROBILINOGEN FLD QL: NEGATIVE MG/DL — SIGNIFICANT CHANGE UP
WBC # BLD: 8.1 K/UL — SIGNIFICANT CHANGE UP (ref 4.8–10.8)
WBC # FLD AUTO: 8.1 K/UL — SIGNIFICANT CHANGE UP (ref 4.8–10.8)
WBC UR QL: ABNORMAL

## 2018-02-05 PROCEDURE — 93010 ELECTROCARDIOGRAM REPORT: CPT

## 2018-02-05 PROCEDURE — 71046 X-RAY EXAM CHEST 2 VIEWS: CPT | Mod: 26

## 2018-02-05 RX ORDER — CEFUROXIME AXETIL 250 MG
1500 TABLET ORAL ONCE
Qty: 0 | Refills: 0 | Status: COMPLETED | OUTPATIENT
Start: 2018-02-09 | End: 2018-02-09

## 2018-02-05 RX ORDER — OMEPRAZOLE 10 MG/1
1 CAPSULE, DELAYED RELEASE ORAL
Qty: 0 | Refills: 0 | COMMUNITY

## 2018-02-05 RX ORDER — ACETAMINOPHEN 500 MG
1 TABLET ORAL
Qty: 0 | Refills: 0 | COMMUNITY

## 2018-02-05 RX ORDER — SODIUM CHLORIDE 9 MG/ML
3 INJECTION INTRAMUSCULAR; INTRAVENOUS; SUBCUTANEOUS EVERY 8 HOURS
Qty: 0 | Refills: 0 | Status: DISCONTINUED | OUTPATIENT
Start: 2018-02-09 | End: 2018-02-09

## 2018-02-05 NOTE — H&P PST ADULT - NSANTHOSAYNRD_GEN_A_CORE
No. KAYKAY screening performed.  STOP BANG Legend: 0-2 = LOW Risk; 3-4 = INTERMEDIATE Risk; 5-8 = HIGH Risk

## 2018-02-05 NOTE — H&P PST ADULT - PSH
Bilateral cataracts    H/O brain surgery  subdural  bleed,  had  chris holes  H/O hand surgery    History of cholecystectomy    History of tonsillectomy

## 2018-02-05 NOTE — H&P PST ADULT - HISTORY OF PRESENT ILLNESS
87 yo female with murmur since childhood recently diagnosed with severe aortic stenosis.  Complaints of lethargy, SOB NYHA 3/4 denies chest pain.  Echo revealed moderate MR and severe AS EDIUN +1cm2.  History also includes hypertension, hyperlipidemia, hypothyroidism traumatic subarachnoid hemorrhage after a fall in 2015 which required drainage.  Patient presents for PST for  TAVR

## 2018-02-05 NOTE — PATIENT PROFILE ADULT. - ABILITY TO HEAR (WITH HEARING AID OR HEARING APPLIANCE IF NORMALLY USED):
Mildly to Moderately Impaired: difficulty hearing in some environments or speaker may need to increase volume or speak distinctly/diminished hearing, bilat. Pt has hearing aids

## 2018-02-05 NOTE — PATIENT PROFILE ADULT. - LEARNING ASSESSMENT (PATIENT) ADDITIONAL COMMENTS
Instructed pt and daughter on pre-op instructions, tips for safer surgery, pain management scale, pre-surgical infection prevention instructions, MRSA / MSSA instructions and Influenza Vaccine Info Sheet: Risks & Benefits and instructed pt to take Synthroid and Meteprolol the morning of surgery with a sip of water and verbalized understanding of all.

## 2018-02-06 ENCOUNTER — APPOINTMENT (OUTPATIENT)
Dept: CARDIOLOGY | Facility: CLINIC | Age: 83
End: 2018-02-06
Payer: MEDICARE

## 2018-02-06 VITALS
HEART RATE: 64 BPM | BODY MASS INDEX: 27.63 KG/M2 | DIASTOLIC BLOOD PRESSURE: 74 MMHG | HEIGHT: 62.5 IN | WEIGHT: 154 LBS | SYSTOLIC BLOOD PRESSURE: 138 MMHG | OXYGEN SATURATION: 99 %

## 2018-02-06 LAB
CULTURE RESULTS: NO GROWTH — SIGNIFICANT CHANGE UP
SPECIMEN SOURCE: SIGNIFICANT CHANGE UP

## 2018-02-06 PROCEDURE — 99214 OFFICE O/P EST MOD 30 MIN: CPT

## 2018-02-08 PROCEDURE — 81001 URINALYSIS AUTO W/SCOPE: CPT

## 2018-02-08 PROCEDURE — 84436 ASSAY OF TOTAL THYROXINE: CPT

## 2018-02-08 PROCEDURE — 84134 ASSAY OF PREALBUMIN: CPT

## 2018-02-08 PROCEDURE — 85027 COMPLETE CBC AUTOMATED: CPT

## 2018-02-08 PROCEDURE — 93005 ELECTROCARDIOGRAM TRACING: CPT

## 2018-02-08 PROCEDURE — 84480 ASSAY TRIIODOTHYRONINE (T3): CPT

## 2018-02-08 PROCEDURE — 87086 URINE CULTURE/COLONY COUNT: CPT

## 2018-02-08 PROCEDURE — 84443 ASSAY THYROID STIM HORMONE: CPT

## 2018-02-08 PROCEDURE — G0463: CPT

## 2018-02-08 PROCEDURE — 87640 STAPH A DNA AMP PROBE: CPT

## 2018-02-08 PROCEDURE — 86900 BLOOD TYPING SEROLOGIC ABO: CPT

## 2018-02-08 PROCEDURE — 86923 COMPATIBILITY TEST ELECTRIC: CPT

## 2018-02-08 PROCEDURE — 80053 COMPREHEN METABOLIC PANEL: CPT

## 2018-02-08 PROCEDURE — 85730 THROMBOPLASTIN TIME PARTIAL: CPT

## 2018-02-08 PROCEDURE — 71046 X-RAY EXAM CHEST 2 VIEWS: CPT

## 2018-02-08 PROCEDURE — 83880 ASSAY OF NATRIURETIC PEPTIDE: CPT

## 2018-02-08 PROCEDURE — 36415 COLL VENOUS BLD VENIPUNCTURE: CPT

## 2018-02-08 PROCEDURE — 87641 MR-STAPH DNA AMP PROBE: CPT

## 2018-02-08 PROCEDURE — 83036 HEMOGLOBIN GLYCOSYLATED A1C: CPT

## 2018-02-08 PROCEDURE — 85610 PROTHROMBIN TIME: CPT

## 2018-02-08 PROCEDURE — 86850 RBC ANTIBODY SCREEN: CPT

## 2018-02-08 PROCEDURE — 86901 BLOOD TYPING SEROLOGIC RH(D): CPT

## 2018-02-09 ENCOUNTER — INPATIENT (INPATIENT)
Facility: HOSPITAL | Age: 83
LOS: 1 days | Discharge: HOME | DRG: 266 | End: 2018-02-11
Attending: THORACIC SURGERY (CARDIOTHORACIC VASCULAR SURGERY) | Admitting: THORACIC SURGERY (CARDIOTHORACIC VASCULAR SURGERY)
Payer: MEDICARE

## 2018-02-09 ENCOUNTER — APPOINTMENT (OUTPATIENT)
Dept: CARDIOTHORACIC SURGERY | Facility: HOSPITAL | Age: 83
End: 2018-02-09

## 2018-02-09 VITALS
OXYGEN SATURATION: 100 % | RESPIRATION RATE: 16 BRPM | WEIGHT: 156.53 LBS | HEART RATE: 69 BPM | DIASTOLIC BLOOD PRESSURE: 76 MMHG | HEIGHT: 62 IN | SYSTOLIC BLOOD PRESSURE: 159 MMHG | TEMPERATURE: 98 F

## 2018-02-09 DIAGNOSIS — Z98.890 OTHER SPECIFIED POSTPROCEDURAL STATES: Chronic | ICD-10-CM

## 2018-02-09 DIAGNOSIS — Z90.49 ACQUIRED ABSENCE OF OTHER SPECIFIED PARTS OF DIGESTIVE TRACT: Chronic | ICD-10-CM

## 2018-02-09 DIAGNOSIS — I35.0 NONRHEUMATIC AORTIC (VALVE) STENOSIS: ICD-10-CM

## 2018-02-09 DIAGNOSIS — Z90.89 ACQUIRED ABSENCE OF OTHER ORGANS: Chronic | ICD-10-CM

## 2018-02-09 DIAGNOSIS — H26.9 UNSPECIFIED CATARACT: Chronic | ICD-10-CM

## 2018-02-09 LAB
ALBUMIN SERPL ELPH-MCNC: 3 G/DL — LOW (ref 3.3–5.2)
ALP SERPL-CCNC: 78 U/L — SIGNIFICANT CHANGE UP (ref 40–120)
ALT FLD-CCNC: 10 U/L — SIGNIFICANT CHANGE UP
ANION GAP SERPL CALC-SCNC: 10 MMOL/L — SIGNIFICANT CHANGE UP (ref 5–17)
APTT BLD: 36.9 SEC — SIGNIFICANT CHANGE UP (ref 27.5–37.4)
AST SERPL-CCNC: 18 U/L — SIGNIFICANT CHANGE UP
BILIRUB SERPL-MCNC: 0.9 MG/DL — SIGNIFICANT CHANGE UP (ref 0.4–2)
BLD GP AB SCN SERPL QL: SIGNIFICANT CHANGE UP
BUN SERPL-MCNC: 14 MG/DL — SIGNIFICANT CHANGE UP (ref 8–20)
CALCIUM SERPL-MCNC: 8.4 MG/DL — LOW (ref 8.6–10.2)
CHLORIDE SERPL-SCNC: 100 MMOL/L — SIGNIFICANT CHANGE UP (ref 98–107)
CK MB CFR SERPL CALC: 17.6 NG/ML — HIGH (ref 0–6.7)
CK SERPL-CCNC: 119 U/L — SIGNIFICANT CHANGE UP (ref 25–170)
CK SERPL-CCNC: 177 U/L — HIGH (ref 25–170)
CK SERPL-CCNC: 87 U/L — SIGNIFICANT CHANGE UP (ref 25–170)
CO2 SERPL-SCNC: 24 MMOL/L — SIGNIFICANT CHANGE UP (ref 22–29)
CREAT SERPL-MCNC: 0.78 MG/DL — SIGNIFICANT CHANGE UP (ref 0.5–1.3)
GAS PNL BLDA: SIGNIFICANT CHANGE UP
GAS PNL BLDA: SIGNIFICANT CHANGE UP
GLUCOSE SERPL-MCNC: 148 MG/DL — HIGH (ref 70–115)
HCT VFR BLD CALC: 24.8 % — LOW (ref 37–47)
HCT VFR BLD CALC: 30.2 % — LOW (ref 37–47)
HCT VFR BLD CALC: 31.7 % — LOW (ref 37–47)
HGB BLD-MCNC: 10 G/DL — LOW (ref 12–16)
HGB BLD-MCNC: 10.8 G/DL — LOW (ref 12–16)
HGB BLD-MCNC: 8.5 G/DL — LOW (ref 12–16)
INR BLD: 1.08 RATIO — SIGNIFICANT CHANGE UP (ref 0.88–1.16)
MAGNESIUM SERPL-MCNC: 1.5 MG/DL — LOW (ref 1.8–2.6)
MCHC RBC-ENTMCNC: 28.7 PG — SIGNIFICANT CHANGE UP (ref 27–31)
MCHC RBC-ENTMCNC: 28.8 PG — SIGNIFICANT CHANGE UP (ref 27–31)
MCHC RBC-ENTMCNC: 29.7 PG — SIGNIFICANT CHANGE UP (ref 27–31)
MCHC RBC-ENTMCNC: 33.1 G/DL — SIGNIFICANT CHANGE UP (ref 32–36)
MCHC RBC-ENTMCNC: 34.1 G/DL — SIGNIFICANT CHANGE UP (ref 32–36)
MCHC RBC-ENTMCNC: 34.3 G/DL — SIGNIFICANT CHANGE UP (ref 32–36)
MCV RBC AUTO: 84.5 FL — SIGNIFICANT CHANGE UP (ref 81–99)
MCV RBC AUTO: 86.7 FL — SIGNIFICANT CHANGE UP (ref 81–99)
MCV RBC AUTO: 86.8 FL — SIGNIFICANT CHANGE UP (ref 81–99)
PLATELET # BLD AUTO: 169 K/UL — SIGNIFICANT CHANGE UP (ref 150–400)
PLATELET # BLD AUTO: 197 K/UL — SIGNIFICANT CHANGE UP (ref 150–400)
PLATELET # BLD AUTO: 199 K/UL — SIGNIFICANT CHANGE UP (ref 150–400)
POTASSIUM SERPL-MCNC: 4.7 MMOL/L — SIGNIFICANT CHANGE UP (ref 3.5–5.3)
POTASSIUM SERPL-SCNC: 4.7 MMOL/L — SIGNIFICANT CHANGE UP (ref 3.5–5.3)
PROT SERPL-MCNC: 5.3 G/DL — LOW (ref 6.6–8.7)
PROTHROM AB SERPL-ACNC: 11.9 SEC — SIGNIFICANT CHANGE UP (ref 9.8–12.7)
RBC # BLD: 2.86 M/UL — LOW (ref 4.4–5.2)
RBC # BLD: 3.48 M/UL — LOW (ref 4.4–5.2)
RBC # BLD: 3.75 M/UL — LOW (ref 4.4–5.2)
RBC # FLD: 12.9 % — SIGNIFICANT CHANGE UP (ref 11–15.6)
RBC # FLD: 13.5 % — SIGNIFICANT CHANGE UP (ref 11–15.6)
RBC # FLD: 13.7 % — SIGNIFICANT CHANGE UP (ref 11–15.6)
SODIUM SERPL-SCNC: 134 MMOL/L — LOW (ref 135–145)
TROPONIN T SERPL-MCNC: 0.09 NG/ML — HIGH (ref 0–0.06)
TROPONIN T SERPL-MCNC: 0.12 NG/ML — HIGH (ref 0–0.06)
TROPONIN T SERPL-MCNC: 0.22 NG/ML — HIGH (ref 0–0.06)
TYPE + AB SCN PNL BLD: SIGNIFICANT CHANGE UP
WBC # BLD: 6.9 K/UL — SIGNIFICANT CHANGE UP (ref 4.8–10.8)
WBC # BLD: 9.2 K/UL — SIGNIFICANT CHANGE UP (ref 4.8–10.8)
WBC # BLD: 9.4 K/UL — SIGNIFICANT CHANGE UP (ref 4.8–10.8)
WBC # FLD AUTO: 6.9 K/UL — SIGNIFICANT CHANGE UP (ref 4.8–10.8)
WBC # FLD AUTO: 9.2 K/UL — SIGNIFICANT CHANGE UP (ref 4.8–10.8)
WBC # FLD AUTO: 9.4 K/UL — SIGNIFICANT CHANGE UP (ref 4.8–10.8)

## 2018-02-09 PROCEDURE — 99291 CRITICAL CARE FIRST HOUR: CPT

## 2018-02-09 PROCEDURE — 71045 X-RAY EXAM CHEST 1 VIEW: CPT | Mod: 26

## 2018-02-09 PROCEDURE — 93306 TTE W/DOPPLER COMPLETE: CPT | Mod: 26

## 2018-02-09 PROCEDURE — 33361 REPLACE AORTIC VALVE PERQ: CPT | Mod: 62,Q0

## 2018-02-09 PROCEDURE — 93010 ELECTROCARDIOGRAM REPORT: CPT | Mod: 76

## 2018-02-09 PROCEDURE — 33208 INSRT HEART PM ATRIAL & VENT: CPT | Mod: KX

## 2018-02-09 PROCEDURE — 99223 1ST HOSP IP/OBS HIGH 75: CPT

## 2018-02-09 PROCEDURE — 33362 REPLACE AORTIC VALVE OPEN: CPT | Mod: 62,Q0

## 2018-02-09 RX ORDER — ASPIRIN/CALCIUM CARB/MAGNESIUM 324 MG
81 TABLET ORAL DAILY
Qty: 0 | Refills: 0 | Status: DISCONTINUED | OUTPATIENT
Start: 2018-02-09 | End: 2018-02-10

## 2018-02-09 RX ORDER — ALBUMIN HUMAN 25 %
250 VIAL (ML) INTRAVENOUS ONCE
Qty: 0 | Refills: 0 | Status: COMPLETED | OUTPATIENT
Start: 2018-02-09 | End: 2018-02-09

## 2018-02-09 RX ORDER — CLOPIDOGREL BISULFATE 75 MG/1
75 TABLET, FILM COATED ORAL ONCE
Qty: 0 | Refills: 0 | Status: COMPLETED | OUTPATIENT
Start: 2018-02-09 | End: 2018-02-09

## 2018-02-09 RX ORDER — NOREPINEPHRINE BITARTRATE/D5W 8 MG/250ML
0.01 PLASTIC BAG, INJECTION (ML) INTRAVENOUS
Qty: 8 | Refills: 0 | Status: DISCONTINUED | OUTPATIENT
Start: 2018-02-09 | End: 2018-02-09

## 2018-02-09 RX ORDER — CLOPIDOGREL BISULFATE 75 MG/1
75 TABLET, FILM COATED ORAL DAILY
Qty: 0 | Refills: 0 | Status: CANCELLED | OUTPATIENT
Start: 2018-02-10 | End: 2018-02-09

## 2018-02-09 RX ORDER — POTASSIUM CHLORIDE 20 MEQ
10 PACKET (EA) ORAL
Qty: 0 | Refills: 0 | Status: DISCONTINUED | OUTPATIENT
Start: 2018-02-09 | End: 2018-02-09

## 2018-02-09 RX ORDER — DOCUSATE SODIUM 100 MG
100 CAPSULE ORAL THREE TIMES A DAY
Qty: 0 | Refills: 0 | Status: DISCONTINUED | OUTPATIENT
Start: 2018-02-09 | End: 2018-02-11

## 2018-02-09 RX ORDER — PANTOPRAZOLE SODIUM 20 MG/1
40 TABLET, DELAYED RELEASE ORAL ONCE
Qty: 0 | Refills: 0 | Status: COMPLETED | OUTPATIENT
Start: 2018-02-09 | End: 2018-02-09

## 2018-02-09 RX ORDER — LEVOTHYROXINE SODIUM 125 MCG
100 TABLET ORAL DAILY
Qty: 0 | Refills: 0 | Status: DISCONTINUED | OUTPATIENT
Start: 2018-02-09 | End: 2018-02-09

## 2018-02-09 RX ORDER — SODIUM CHLORIDE 9 MG/ML
1000 INJECTION INTRAMUSCULAR; INTRAVENOUS; SUBCUTANEOUS
Qty: 0 | Refills: 0 | Status: DISCONTINUED | OUTPATIENT
Start: 2018-02-09 | End: 2018-02-11

## 2018-02-09 RX ORDER — SODIUM CHLORIDE 9 MG/ML
3 INJECTION INTRAMUSCULAR; INTRAVENOUS; SUBCUTANEOUS EVERY 8 HOURS
Qty: 0 | Refills: 0 | Status: DISCONTINUED | OUTPATIENT
Start: 2018-02-09 | End: 2018-02-11

## 2018-02-09 RX ORDER — SODIUM CHLORIDE 9 MG/ML
500 INJECTION, SOLUTION INTRAVENOUS ONCE
Qty: 0 | Refills: 0 | Status: COMPLETED | OUTPATIENT
Start: 2018-02-09 | End: 2018-02-09

## 2018-02-09 RX ORDER — MAGNESIUM SULFATE 500 MG/ML
2 VIAL (ML) INJECTION
Qty: 0 | Refills: 0 | Status: COMPLETED | OUTPATIENT
Start: 2018-02-09 | End: 2018-02-09

## 2018-02-09 RX ORDER — CLOPIDOGREL BISULFATE 75 MG/1
75 TABLET, FILM COATED ORAL ONCE
Qty: 0 | Refills: 0 | Status: DISCONTINUED | OUTPATIENT
Start: 2018-02-09 | End: 2018-02-09

## 2018-02-09 RX ORDER — DOCUSATE SODIUM 100 MG
100 CAPSULE ORAL THREE TIMES A DAY
Qty: 0 | Refills: 0 | Status: DISCONTINUED | OUTPATIENT
Start: 2018-02-09 | End: 2018-02-09

## 2018-02-09 RX ORDER — ACETAMINOPHEN 500 MG
1000 TABLET ORAL ONCE
Qty: 0 | Refills: 0 | Status: COMPLETED | OUTPATIENT
Start: 2018-02-09 | End: 2018-02-09

## 2018-02-09 RX ORDER — ONDANSETRON 8 MG/1
4 TABLET, FILM COATED ORAL ONCE
Qty: 0 | Refills: 0 | Status: COMPLETED | OUTPATIENT
Start: 2018-02-09 | End: 2018-02-09

## 2018-02-09 RX ORDER — POTASSIUM CHLORIDE 20 MEQ
10 PACKET (EA) ORAL ONCE
Qty: 0 | Refills: 0 | Status: DISCONTINUED | OUTPATIENT
Start: 2018-02-09 | End: 2018-02-09

## 2018-02-09 RX ORDER — NOREPINEPHRINE BITARTRATE/D5W 8 MG/250ML
0.01 PLASTIC BAG, INJECTION (ML) INTRAVENOUS
Qty: 8 | Refills: 0 | Status: DISCONTINUED | OUTPATIENT
Start: 2018-02-09 | End: 2018-02-10

## 2018-02-09 RX ORDER — ASPIRIN/CALCIUM CARB/MAGNESIUM 324 MG
81 TABLET ORAL DAILY
Qty: 0 | Refills: 0 | Status: DISCONTINUED | OUTPATIENT
Start: 2018-02-09 | End: 2018-02-09

## 2018-02-09 RX ORDER — SODIUM CHLORIDE 9 MG/ML
1000 INJECTION INTRAMUSCULAR; INTRAVENOUS; SUBCUTANEOUS
Qty: 0 | Refills: 0 | Status: DISCONTINUED | OUTPATIENT
Start: 2018-02-09 | End: 2018-02-09

## 2018-02-09 RX ORDER — CEFUROXIME AXETIL 250 MG
1500 TABLET ORAL EVERY 8 HOURS
Qty: 0 | Refills: 0 | Status: COMPLETED | OUTPATIENT
Start: 2018-02-09 | End: 2018-02-11

## 2018-02-09 RX ORDER — POTASSIUM CHLORIDE 20 MEQ
10 PACKET (EA) ORAL ONCE
Qty: 0 | Refills: 0 | Status: COMPLETED | OUTPATIENT
Start: 2018-02-09 | End: 2018-02-09

## 2018-02-09 RX ORDER — LEVOTHYROXINE SODIUM 125 MCG
100 TABLET ORAL DAILY
Qty: 0 | Refills: 0 | Status: DISCONTINUED | OUTPATIENT
Start: 2018-02-09 | End: 2018-02-11

## 2018-02-09 RX ORDER — CEFUROXIME AXETIL 250 MG
1500 TABLET ORAL EVERY 8 HOURS
Qty: 0 | Refills: 0 | Status: DISCONTINUED | OUTPATIENT
Start: 2018-02-09 | End: 2018-02-09

## 2018-02-09 RX ADMIN — ONDANSETRON 4 MILLIGRAM(S): 8 TABLET, FILM COATED ORAL at 22:56

## 2018-02-09 RX ADMIN — Medication 1000 MILLIGRAM(S): at 12:00

## 2018-02-09 RX ADMIN — SODIUM CHLORIDE 3 MILLILITER(S): 9 INJECTION INTRAMUSCULAR; INTRAVENOUS; SUBCUTANEOUS at 21:52

## 2018-02-09 RX ADMIN — Medication 1.33 MICROGRAM(S)/KG/MIN: at 11:58

## 2018-02-09 RX ADMIN — SODIUM CHLORIDE 10 MILLILITER(S): 9 INJECTION INTRAMUSCULAR; INTRAVENOUS; SUBCUTANEOUS at 11:55

## 2018-02-09 RX ADMIN — SODIUM CHLORIDE 2000 MILLILITER(S): 9 INJECTION, SOLUTION INTRAVENOUS at 10:35

## 2018-02-09 RX ADMIN — Medication 50 GRAM(S): at 11:35

## 2018-02-09 RX ADMIN — PANTOPRAZOLE SODIUM 40 MILLIGRAM(S): 20 TABLET, DELAYED RELEASE ORAL at 23:27

## 2018-02-09 RX ADMIN — SODIUM CHLORIDE 3 MILLILITER(S): 9 INJECTION INTRAMUSCULAR; INTRAVENOUS; SUBCUTANEOUS at 14:00

## 2018-02-09 RX ADMIN — Medication 125 MILLILITER(S): at 11:20

## 2018-02-09 RX ADMIN — Medication 100 MILLIEQUIVALENT(S): at 10:47

## 2018-02-09 RX ADMIN — Medication 100 MILLIGRAM(S): at 22:15

## 2018-02-09 RX ADMIN — Medication 50 GRAM(S): at 12:17

## 2018-02-09 RX ADMIN — Medication 125 MILLILITER(S): at 17:40

## 2018-02-09 RX ADMIN — SODIUM CHLORIDE 2000 MILLILITER(S): 9 INJECTION, SOLUTION INTRAVENOUS at 10:25

## 2018-02-09 RX ADMIN — CLOPIDOGREL BISULFATE 75 MILLIGRAM(S): 75 TABLET, FILM COATED ORAL at 22:15

## 2018-02-09 RX ADMIN — Medication 400 MILLIGRAM(S): at 11:40

## 2018-02-09 RX ADMIN — PANTOPRAZOLE SODIUM 40 MILLIGRAM(S): 20 TABLET, DELAYED RELEASE ORAL at 11:52

## 2018-02-09 RX ADMIN — Medication 100 MILLIGRAM(S): at 14:07

## 2018-02-09 NOTE — BRIEF OPERATIVE NOTE - PRE-OP DX
Chronic diastolic CHF (congestive heart failure), NYHA class 3  02/09/2018    Dai Cruz  Severe aortic stenosis  02/09/2018  EDUIN 0.75  Dai Cruz

## 2018-02-09 NOTE — CONSULT NOTE ADULT - SUBJECTIVE AND OBJECTIVE BOX
85 yo Female with PMH HTN, HLD, Hypothyroid, Traumatic SAH 2015, 1st degree AV block ( ms) w/ left axis deviation and IVCD (QRS 130s ms) (prior EKG w/ CA 210ms, leftward axis, RBBB). Pt had a recent TTE with evidence of severe AS mean AV gradient of 43mmHg, Mild AI, MAC and moderate MR. Pt c/o worsening SOB with exertion and lethargy. She underwent Cardiac Cath 2/5/18, which  demonstrated 80% stenosis of LAD and Severe AS with a valve gradient of 60mmHg. Now s/p TAVR w/ subsequent CHB with no escape rhythm; currently paced via femoral temp wire with suboptimal hemodynamic response to dyssynchronous RV apical pacing.       PAST MEDICAL & SURGICAL HISTORY:  Osteoarthritis of back  Urine incontinence  Nocturia  MR (mitral regurgitation)  Hypothyroid  Dehydration  Syncope and collapse  GERD (gastroesophageal reflux disease)  HTN (hypertension)  HLD (hyperlipidemia)  Fatigue  Dry eyes  Subdural hematoma  Hard of hearing  Back pain  Aortic valve stenosis  Actinic keratosis  Intraventricular block  H/O hand surgery  History of cholecystectomy  History of tonsillectomy  Bilateral cataracts  H/O brain surgery: subdural  bleed,  had  chris holes      REVIEW OF SYSTEMS:  CONSTITUTIONAL: No fever, weight loss, or fatigue  EYES: No eye pain, visual disturbances, or discharge  ENMT:  No difficulty hearing, tinnitus, vertigo; No sinus or throat pain  NECK: No pain or stiffness  BREASTS: No pain, masses, or nipple discharge  RESPIRATORY: No cough, wheezing, chills or hemoptysis; No shortness of breath  CARDIOVASCULAR: see HPI  GASTROINTESTINAL: No abdominal or epigastric pain. No nausea, vomiting, or hematemesis; No diarrhea or constipation. No melena or hematochezia.  GENITOURINARY: No dysuria, frequency, hematuria, or incontinence  NEUROLOGICAL: No headaches, memory loss, loss of strength, numbness, or tremors  SKIN: No itching, burning, rashes, or lesions   LYMPH NODES: No enlarged glands  ENDOCRINE: No heat or cold intolerance; No hair loss  MUSCULOSKELETAL: No joint pain or swelling; No muscle, back, or extremity pain  PSYCHIATRIC: No depression, anxiety, mood swings, or difficulty sleeping  HEME/LYMPH: No easy bruising, or bleeding gums  ALLERY AND IMMUNOLOGIC: No hives or eczema      MEDICATIONS  (STANDING):  cefuroxime  IVPB 1500 milliGRAM(s) IV Intermittent once  docusate sodium 100 milliGRAM(s) Oral three times a day  lactated ringers Bolus 500 milliLiter(s) IV Bolus once  norepinephrine Infusion 0.01 MICROgram(s)/kG/Min (1.331 mL/Hr) IV Continuous <Continuous>  pantoprazole  Injectable 40 milliGRAM(s) IV Push once  potassium chloride  10 mEq/50 mL IVPB 10 milliEquivalent(s) IV Intermittent every 1 hour  potassium chloride  10 mEq/50 mL IVPB 10 milliEquivalent(s) IV Intermittent every 1 hour  potassium chloride  10 mEq/50 mL IVPB 10 milliEquivalent(s) IV Intermittent once  potassium chloride  10 mEq/50 mL IVPB 10 milliEquivalent(s) IV Intermittent once  sodium chloride 0.9%. 1000 milliLiter(s) (10 mL/Hr) IV Continuous <Continuous>    MEDICATIONS  (PRN):  acetaminophen  IVPB. 1000 milliGRAM(s) IV Intermittent once PRN Mild Pain (1 - 3)    Allergies  No Known Allergies    Intolerances  aspirin (Other)    FAMILY HISTORY:  Family history of heart attack (Father)    Vital Signs Last 24 Hrs  T(C): 35.8 (09 Feb 2018 10:30), Max: 36.6 (09 Feb 2018 05:51)  T(F): 96.4 (09 Feb 2018 10:30), Max: 97.9 (09 Feb 2018 05:51)  HR: 80 (09 Feb 2018 10:35) (49 - 80)  BP: 91/50 (09 Feb 2018 10:35) (91/50 - 159/76)  BP(mean): 67 (09 Feb 2018 10:35) (67 - 88)  RR: 17 (09 Feb 2018 10:35) (14 - 18)  SpO2: 99% (09 Feb 2018 0:35) (99% - 100%)    Physical Exam:  Constitutional: AAOx3, NAD  Neck: supple, No JVD  Cardiovascular: +S1S2 RRR, no murmurs, rubs, gallops   Pulmonary: CTA b/l, unlabored, no wheezes, rales. rhonci  Abdomen: +BS, soft NTND  Extremities: no edema b/l, +distal pulses b/l  Neuro: non focal, speech clear, CABRERA x 4    LABS:                        10.0   9.4   )-----------( 197      ( 09 Feb 2018 10:31 )             30.2     PT/INR - ( 09 Feb 2018 10:31 )   PT: 11.9 sec;   INR: 1.08 ratio      PTT - ( 09 Feb 2018 10:31 )  PTT:36.9 sec    RADIOLOGY & ADDITIONAL STUDIES:  < from: TTE Echo Complete w/Doppler (01.24.18 @ 12:09) >  Summary:   1. Technically good study.   2. Hyperdynamic global left ventricular systolic function.   3. Left ventricular ejection fraction, by visual estimation, is >75%.   4. Spectral Doppler shows pseudonormal pattern of left ventricular   myocardial filling (Grade II diastolic dysfunction).   5. Elevated left atrial and left ventricular end-diastolic pressures.   6. Thickening of the anterior and posterior mitral valve leaflets.   7. Moderate mitral annular calcification.   8. Moderate mitral valve regurgitation.   9. Severely enlarged left atrium.  10. Peak transaortic gradient equals 96.5 mmHg, mean transaortic gradient   equals 52.5 mmHg, the calculated aortic valve area equals 0.75 cm² by the   continuity equation consistent with severe aortic stenosis (AVAI=0.39   cm2/m2).  11. Estimated pulmonary artery systolic pressure is 35.5 mmHg assuming a   right atrial pressure of 3 mmHg, which is consistent with borderline   pulmonary hypertension.  12. There is no evidence of pericardial effusion.    MD Hilda Electronically signed on 1/29/2018 at 5:40:11 PM    < end of copied text >

## 2018-02-09 NOTE — PROGRESS NOTE ADULT - SUBJECTIVE AND OBJECTIVE BOX
Pt received from OR s/p transfemoral TAVR   100% v-paced secondary to ventricular standstill. Pt did regain rhythm which appeared to be SR with very prolonged YUE, R BBB. Discussed with Dr Koo and decision made to proceed with PPM as pt very high risk for further conduction disturbances and is likely to need beta-blockade. Pt now s/p [employer] PPM.  Prior to PPM insertion pt with c/o CP described as constant pressure and dissimilar to any previous pain she has had. Denies palpitations, "pinching sensation", N/V, abd pain, dizziness, diaphoresis.  Initial set of CE: CK 87, Troponin T 0.09, EKG unhelpful since is 100% V paced using TVPW via L femoral sheath. IV acetaminophen given without much relief. Stat bedside TTE done (reported as normal EF and no new WMAs). Of note, once pt's EPM turned off/intrinsic rhythm returned pain went away. Loulou Christy and Neri aware of plan/results. Repeat CE: , Troponin 0.12 (drawn post PPM insertion).  Pt with shock likely d/t hypovolemia (+ JOSHUA as seen on echo). Invasive hemodynamic monitoring with an A-line was required for the following of continuous MAP/BP monitoring to ensure adequate cardiovascular support and to evaluate for & help prevent decompensation while receiving intermittent volume expansion and titration of norepinephrine to maintain MAP >65.  Acute blood loss anemia, bilat groins (R femoral cutdown and L sheath) and PPM site soft and without hematoma. Distal motor/neuro/circ intact. PRBC x 2 now. Will continue to monitor/trend VS and H/H. Consider abd/pelvis CT (to r/o RP bleed) if no improvement.     Patient required more than the usual postoperative critical care management and I provided __50__ minutes of non-continuous care to the patient.  Discussed at length with the CTICU staff and helped coordinate care.    Dr Christy aware of events as noted above. Pt received from OR s/p transfemoral TAVR secondary to severe aortic stenosis  100% v-paced secondary to ventricular standstill. Pt did regain rhythm which appeared to be SR with very prolonged YUE, R BBB. Discussed with Dr Koo and decision made to proceed with PPM as pt very high risk for further conduction disturbances and is likely to need beta-blockade. Pt now s/p [place] PPM.  Prior to PPM insertion pt with c/o CP described as constant pressure and dissimilar to any previous pain she has had. Denies palpitations, "pinching sensation", N/V, abd pain, dizziness, diaphoresis.  Initial set of CE: CK 87, Troponin T 0.09, EKG unhelpful since is 100% V paced using TVPW via L femoral sheath. IV acetaminophen given without much relief. Stat bedside TTE done (reported as normal EF and no new WMAs). Of note, once pt's EPM turned off/intrinsic rhythm returned pain went away. Loulou Christy and Neri aware of plan/results. Repeat CE: , Troponin 0.12 (drawn post PPM insertion).  Pt with shock likely d/t hypovolemia (+ JOSHUA as seen on echo). Invasive hemodynamic monitoring with an A-line was required for the following of continuous MAP/BP monitoring to ensure adequate cardiovascular support and to evaluate for & help prevent decompensation while receiving intermittent volume expansion and titration of norepinephrine to maintain MAP >65.  Acute blood loss anemia, bilat groins (R femoral cutdown and L sheath) and PPM site soft and without hematoma. Distal motor/neuro/circ intact. PRBC x 2 now. Will continue to monitor/trend VS and H/H. Consider abd/pelvis CT (to r/o RP bleed) if no improvement.     Patient required more than the usual postoperative critical care management and I provided __50__ minutes of non-continuous care to the patient.  Discussed at length with the CTICU staff and helped coordinate care.    Dr Christy aware of events as noted above.

## 2018-02-09 NOTE — BRIEF OPERATIVE NOTE - PROCEDURE
<<-----Click on this checkbox to enter Procedure TAVR, percutaneous femoral approach, using prosthetic valve  02/09/2018  23 mm sabas valve via right groin percutaneous approach  Active  MHOERNING

## 2018-02-09 NOTE — CONSULT NOTE ADULT - ATTENDING COMMENTS
Patient seen and examined   Agree with assessment and plan as above.  87 yo Female with PMH HTN, HLD, Hypothyroid, Traumatic SAH 2015, 1st degree AV block ( ms) w/ left axis deviation and IVCD (QRS 130s ms) (prior EKG w/ VT 210ms, leftward axis, RBBB). Pt had a recent TTE with evidence of severe AS mean AV gradient of 43mmHg, Mild AI, MAC and moderate MR. Pt c/o worsening SOB with exertion and lethargy. She underwent Cardiac Cath 2/5/18, which  demonstrated 80% stenosis of LAD and Severe AS with a valve gradient of 60mmHg. Now s/p TAVR w/ subsequent CHB with no escape rhythm; currently paced via femoral temp wire with suboptimal hemodynamic response to dyssynchronous RV apical pacing.    Imp:  Agree with PPM for bradycardia support   Informed consent obtained

## 2018-02-09 NOTE — BRIEF OPERATIVE NOTE - POST-OP DX
Chronic diastolic heart failure, NYHA class 3  02/09/2018    Dai Cruz  Severe aortic stenosis  02/09/2018  severo 0.75  Dai Cruz

## 2018-02-09 NOTE — PROGRESS NOTE ADULT - SUBJECTIVE AND OBJECTIVE BOX
Patient seen today following [employer] [place] [employer] dual chamber pacemaker implantation. No acute post operative complaints   Settings: DDD  with /220ms    Left Chest: No hematoma. Dressing dry and intact.     A/P  Complete AV block following Krause TAVR    - Pain Rx  - Xray tomorrow   - Stem check in AM  - AM labs and EKG  - Doxycycline 100mg PO BID x 3 days post device implant starting 2/10/18  - Would avoid SC or IV Heparin. Would Avoid Lovenox. Recommend sequential booties for DVT prophylaxis    Wound Care  - Keep affected arm below shoulder, with no heavy lifting for 6 weeks.    - Keep the site dry (no showers) for 48 hours.    - Leave the sutures in place. They will be removed at your follow up appointment.   - Please notify the physician if there is any bleeding, drainage or swelling of the site.    - Please notify the physician of any fever greater than 100.4.

## 2018-02-09 NOTE — PROGRESS NOTE ADULT - SUBJECTIVE AND OBJECTIVE BOX
Commercial 23 mm Krause Perry S3 Percutaneous Transfemoral TAVR via Right Common Femoral Artery.  NCT# [id number], STS/ACC TVT Registry Patient ID# ?

## 2018-02-09 NOTE — CONSULT NOTE ADULT - ASSESSMENT
85 yo Female with PMH HTN, HLD, Hypothyroid, Traumatic SAH 2015, 1st degree AV block ( ms) w/ left axis deviation and IVCD (QRS 130s ms) (prior EKG w/ OR 210ms, leftward axis, RBBB). Pt had a recent TTE with evidence of severe AS mean AV gradient of 43mmHg, Mild AI, MAC and moderate MR. Pt c/o worsening SOB with exertion and lethargy. She underwent Cardiac Cath 2/5/18, which  demonstrated 80% stenosis of LAD and Severe AS with a valve gradient of 60mmHg. Now s/p TAVR w/ subsequent CHB with no escape rhythm; currently paced via femoral temp wire with suboptimal hemodynamic response to dyssynchronous RV apical pacing.     Recommendations:   Given pre-existing conduction disease and poor tolerance of temporary pacing, we will proceed with permanent pacemaker maker this afternoon.   Keep pt NPO.   Pt is agreeable to PPM. If needed, pt's HCP (daughter Dominga) can provide consent.   Will d/w Dr. Koo.

## 2018-02-10 DIAGNOSIS — I25.10 ATHEROSCLEROTIC HEART DISEASE OF NATIVE CORONARY ARTERY WITHOUT ANGINA PECTORIS: ICD-10-CM

## 2018-02-10 DIAGNOSIS — I44.30 UNSPECIFIED ATRIOVENTRICULAR BLOCK: ICD-10-CM

## 2018-02-10 DIAGNOSIS — D62 ACUTE POSTHEMORRHAGIC ANEMIA: ICD-10-CM

## 2018-02-10 DIAGNOSIS — I50.32 CHRONIC DIASTOLIC (CONGESTIVE) HEART FAILURE: ICD-10-CM

## 2018-02-10 DIAGNOSIS — R57.1 HYPOVOLEMIC SHOCK: ICD-10-CM

## 2018-02-10 LAB
ANION GAP SERPL CALC-SCNC: 12 MMOL/L — SIGNIFICANT CHANGE UP (ref 5–17)
BUN SERPL-MCNC: 15 MG/DL — SIGNIFICANT CHANGE UP (ref 8–20)
CALCIUM SERPL-MCNC: 8.7 MG/DL — SIGNIFICANT CHANGE UP (ref 8.6–10.2)
CHLORIDE SERPL-SCNC: 98 MMOL/L — SIGNIFICANT CHANGE UP (ref 98–107)
CK MB CFR SERPL CALC: 18.1 NG/ML — HIGH (ref 0–6.7)
CK SERPL-CCNC: 194 U/L — HIGH (ref 25–170)
CO2 SERPL-SCNC: 23 MMOL/L — SIGNIFICANT CHANGE UP (ref 22–29)
CREAT SERPL-MCNC: 0.75 MG/DL — SIGNIFICANT CHANGE UP (ref 0.5–1.3)
GLUCOSE SERPL-MCNC: 123 MG/DL — HIGH (ref 70–115)
HCT VFR BLD CALC: 26.4 % — LOW (ref 37–47)
HCT VFR BLD CALC: 27.6 % — LOW (ref 37–47)
HCT VFR BLD CALC: 30.2 % — LOW (ref 37–47)
HGB BLD-MCNC: 10.5 G/DL — LOW (ref 12–16)
HGB BLD-MCNC: 9 G/DL — LOW (ref 12–16)
HGB BLD-MCNC: 9.3 G/DL — LOW (ref 12–16)
MAGNESIUM SERPL-MCNC: 2.3 MG/DL — SIGNIFICANT CHANGE UP (ref 1.6–2.6)
MCHC RBC-ENTMCNC: 28.7 PG — SIGNIFICANT CHANGE UP (ref 27–31)
MCHC RBC-ENTMCNC: 28.8 PG — SIGNIFICANT CHANGE UP (ref 27–31)
MCHC RBC-ENTMCNC: 29.4 PG — SIGNIFICANT CHANGE UP (ref 27–31)
MCHC RBC-ENTMCNC: 33.7 G/DL — SIGNIFICANT CHANGE UP (ref 32–36)
MCHC RBC-ENTMCNC: 34.1 G/DL — SIGNIFICANT CHANGE UP (ref 32–36)
MCHC RBC-ENTMCNC: 34.8 G/DL — SIGNIFICANT CHANGE UP (ref 32–36)
MCV RBC AUTO: 84.3 FL — SIGNIFICANT CHANGE UP (ref 81–99)
MCV RBC AUTO: 84.6 FL — SIGNIFICANT CHANGE UP (ref 81–99)
MCV RBC AUTO: 85.2 FL — SIGNIFICANT CHANGE UP (ref 81–99)
PLATELET # BLD AUTO: 108 K/UL — LOW (ref 150–400)
PLATELET # BLD AUTO: 118 K/UL — LOW (ref 150–400)
PLATELET # BLD AUTO: 151 K/UL — SIGNIFICANT CHANGE UP (ref 150–400)
POTASSIUM SERPL-MCNC: 4.4 MMOL/L — SIGNIFICANT CHANGE UP (ref 3.5–5.3)
POTASSIUM SERPL-SCNC: 4.4 MMOL/L — SIGNIFICANT CHANGE UP (ref 3.5–5.3)
RBC # BLD: 3.13 M/UL — LOW (ref 4.4–5.2)
RBC # BLD: 3.24 M/UL — LOW (ref 4.4–5.2)
RBC # BLD: 3.57 M/UL — LOW (ref 4.4–5.2)
RBC # FLD: 13.9 % — SIGNIFICANT CHANGE UP (ref 11–15.6)
RBC # FLD: 14.3 % — SIGNIFICANT CHANGE UP (ref 11–15.6)
RBC # FLD: 14.3 % — SIGNIFICANT CHANGE UP (ref 11–15.6)
SODIUM SERPL-SCNC: 133 MMOL/L — LOW (ref 135–145)
TROPONIN T SERPL-MCNC: 0.29 NG/ML — HIGH (ref 0–0.06)
WBC # BLD: 10.1 K/UL — SIGNIFICANT CHANGE UP (ref 4.8–10.8)
WBC # BLD: 7.9 K/UL — SIGNIFICANT CHANGE UP (ref 4.8–10.8)
WBC # BLD: 8.8 K/UL — SIGNIFICANT CHANGE UP (ref 4.8–10.8)
WBC # FLD AUTO: 10.1 K/UL — SIGNIFICANT CHANGE UP (ref 4.8–10.8)
WBC # FLD AUTO: 7.9 K/UL — SIGNIFICANT CHANGE UP (ref 4.8–10.8)
WBC # FLD AUTO: 8.8 K/UL — SIGNIFICANT CHANGE UP (ref 4.8–10.8)

## 2018-02-10 PROCEDURE — 93010 ELECTROCARDIOGRAM REPORT: CPT | Mod: 76

## 2018-02-10 PROCEDURE — 33215 REPOSITION PACING-DEFIB LEAD: CPT

## 2018-02-10 PROCEDURE — 99291 CRITICAL CARE FIRST HOUR: CPT

## 2018-02-10 PROCEDURE — 93306 TTE W/DOPPLER COMPLETE: CPT | Mod: 26

## 2018-02-10 PROCEDURE — 71045 X-RAY EXAM CHEST 1 VIEW: CPT | Mod: 26

## 2018-02-10 PROCEDURE — 93308 TTE F-UP OR LMTD: CPT | Mod: 26

## 2018-02-10 RX ORDER — FENTANYL CITRATE 50 UG/ML
50 INJECTION INTRAVENOUS ONCE
Qty: 0 | Refills: 0 | Status: DISCONTINUED | OUTPATIENT
Start: 2018-02-10 | End: 2018-02-10

## 2018-02-10 RX ORDER — METOPROLOL TARTRATE 50 MG
25 TABLET ORAL DAILY
Qty: 0 | Refills: 0 | Status: DISCONTINUED | OUTPATIENT
Start: 2018-02-10 | End: 2018-02-10

## 2018-02-10 RX ORDER — ALBUMIN HUMAN 25 %
250 VIAL (ML) INTRAVENOUS ONCE
Qty: 0 | Refills: 0 | Status: COMPLETED | OUTPATIENT
Start: 2018-02-10 | End: 2018-02-10

## 2018-02-10 RX ORDER — ACETAMINOPHEN 500 MG
1000 TABLET ORAL ONCE
Qty: 0 | Refills: 0 | Status: COMPLETED | OUTPATIENT
Start: 2018-02-10 | End: 2018-02-10

## 2018-02-10 RX ORDER — CLOPIDOGREL BISULFATE 75 MG/1
75 TABLET, FILM COATED ORAL DAILY
Qty: 0 | Refills: 0 | Status: DISCONTINUED | OUTPATIENT
Start: 2018-02-10 | End: 2018-02-10

## 2018-02-10 RX ORDER — PHENYLEPHRINE HYDROCHLORIDE 10 MG/ML
0.38 INJECTION INTRAVENOUS
Qty: 80 | Refills: 0 | Status: DISCONTINUED | OUTPATIENT
Start: 2018-02-10 | End: 2018-02-11

## 2018-02-10 RX ORDER — FENTANYL CITRATE 50 UG/ML
25 INJECTION INTRAVENOUS ONCE
Qty: 0 | Refills: 0 | Status: DISCONTINUED | OUTPATIENT
Start: 2018-02-10 | End: 2018-02-10

## 2018-02-10 RX ORDER — PANTOPRAZOLE SODIUM 20 MG/1
40 TABLET, DELAYED RELEASE ORAL
Qty: 0 | Refills: 0 | Status: DISCONTINUED | OUTPATIENT
Start: 2018-02-10 | End: 2018-02-11

## 2018-02-10 RX ADMIN — SODIUM CHLORIDE 3 MILLILITER(S): 9 INJECTION INTRAMUSCULAR; INTRAVENOUS; SUBCUTANEOUS at 21:33

## 2018-02-10 RX ADMIN — SODIUM CHLORIDE 3 MILLILITER(S): 9 INJECTION INTRAMUSCULAR; INTRAVENOUS; SUBCUTANEOUS at 04:38

## 2018-02-10 RX ADMIN — Medication 100 MILLIGRAM(S): at 22:29

## 2018-02-10 RX ADMIN — Medication 125 MILLILITER(S): at 15:00

## 2018-02-10 RX ADMIN — Medication 100 MILLIGRAM(S): at 13:25

## 2018-02-10 RX ADMIN — Medication 400 MILLIGRAM(S): at 03:43

## 2018-02-10 RX ADMIN — FENTANYL CITRATE 25 MICROGRAM(S): 50 INJECTION INTRAVENOUS at 05:26

## 2018-02-10 RX ADMIN — Medication 100 MILLIGRAM(S): at 13:28

## 2018-02-10 RX ADMIN — Medication 400 MILLIGRAM(S): at 15:55

## 2018-02-10 RX ADMIN — SODIUM CHLORIDE 3 MILLILITER(S): 9 INJECTION INTRAMUSCULAR; INTRAVENOUS; SUBCUTANEOUS at 13:28

## 2018-02-10 RX ADMIN — Medication 1000 MILLIGRAM(S): at 04:00

## 2018-02-10 RX ADMIN — Medication 100 MILLIGRAM(S): at 04:48

## 2018-02-10 RX ADMIN — Medication 100 MILLIGRAM(S): at 21:33

## 2018-02-10 RX ADMIN — Medication 100 MILLIGRAM(S): at 07:18

## 2018-02-10 RX ADMIN — Medication 1000 MILLIGRAM(S): at 16:15

## 2018-02-10 RX ADMIN — PANTOPRAZOLE SODIUM 40 MILLIGRAM(S): 20 TABLET, DELAYED RELEASE ORAL at 07:18

## 2018-02-10 RX ADMIN — Medication 100 MICROGRAM(S): at 07:18

## 2018-02-10 RX ADMIN — Medication 125 MILLILITER(S): at 16:26

## 2018-02-10 NOTE — PROGRESS NOTE ADULT - SUBJECTIVE AND OBJECTIVE BOX
CHIEF COMPLAINT:Patient is a 86y old  Female who presents with a chief complaint of "TAVR" (05 Feb 2018 12:57) Dual chamber PPM for HB      INTERVAL HISTORY: Post PPM upon device check this am high thresh holds were noted and episode of Ventricular standstill .  Double lead revision completed this am successfully with out complications.       Allergies    No Known Allergies    Intolerances    aspirin (Other)    	  MEDICATIONS:    cefuroxime  IVPB 1500 milliGRAM(s) IV Intermittent every 8 hours        docusate sodium 100 milliGRAM(s) Oral three times a day  pantoprazole    Tablet 40 milliGRAM(s) Oral before breakfast    levothyroxine 100 MICROGram(s) Oral daily    sodium chloride 0.9%. 1000 milliLiter(s) IV Continuous <Continuous>        PHYSICAL EXAM:    T(C): 37 (02-10-18 @ 12:00), Max: 37.1 (02-09-18 @ 16:40)  HR: 62 (02-10-18 @ 15:00) (54 - 83)  BP: 87/50 (02-10-18 @ 15:00) (87/50 - 147/66)  RR: 12 (02-10-18 @ 15:00) (10 - 33)  SpO2: 94% (02-10-18 @ 15:00) (88% - 100%)  Wt(kg): --    I&O's Summary    09 Feb 2018 07:01  -  10 Feb 2018 07:00  --------------------------------------------------------  IN: 3795 mL / OUT: 1070 mL / NET: 2725 mL    10 Feb 2018 07:01  -  10 Feb 2018 15:15  --------------------------------------------------------  IN: 860 mL / OUT: 735 mL / NET: 125 mL            Appearance: Normal	  HEENT:   NC/AT  Eye: Pink Conjunctiva  Lungs: CTA B/L  CVS: RRR, Normal S1 and S2, No Edema  Pulses: Normal distal pulses.  Neuro: A&O x3  Left chest wall surgical site benign no hematoma .     TELEMETRY: 	marcello at 52          LABS:	 	    CARDIAC MARKERS:                            10.5   10.1  )-----------( 151      ( 10 Feb 2018 02:44 )             30.2     02-10    133<L>  |  98  |  15.0  ----------------------------<  123<H>  4.4   |  23.0  |  0.75    Ca    8.7      10 Feb 2018 02:44  Mg     2.3     02-10    TPro  5.3<L>  /  Alb  3.0<L>  /  TBili  0.9  /  DBili  x   /  AST  18  /  ALT  10  /  AlkPhos  78  02-09      ASSESSMENT/PLAN: 	  Post TAVR HB S/P PPM with double lead revision this am .   Will have BSC evaluate device in the am   Doxycycline 100 mg BID

## 2018-02-10 NOTE — PROGRESS NOTE ADULT - ASSESSMENT
85 yo female PMH Severe AS, hypertension, hyperlipidemia, hypothyroidism, traumatic subarachnoid hemorrhage after a fall in 2015 which required drainage.  now s/p OSIRIS 2/9 via right perc, BS PPM placed 2/9.     Events 2/9  100% v-paced secondary to ventricular standstill. Pt did regain rhythm which appeared to be SR with very prolonged YUE, R BBB. Discussed with Dr Koo and decision made to proceed with PPM as pt very high risk for further conduction disturbances and is likely to need beta-blockade. Pt now s/p [place] PPM.  Prior to PPM insertion pt with c/o CP described as constant pressure and dissimilar to any previous pain she has had. EKG with Vpaced rythm using TVPW, initital CE CK 87, Trop 0.09 repeat Cardiac enzymes , Troponin 0.12 (drawn post PPM insertion). Chest pain self resolved.   TTE(+ JOSHUA as seen on echo without septal contact, LVOT gradient not measured)  Pt received PRBC x2 for Hct 24.8, repeat CBC with Hct 31.7

## 2018-02-10 NOTE — PROGRESS NOTE ADULT - PROBLEM SELECTOR PLAN 6
Pt with eventual planned PCI for LAD lesion, continue lopressor and plavix  pt encouraged to take asa  EKG in AM  continue to trend troponins

## 2018-02-10 NOTE — PROGRESS NOTE ADULT - SUBJECTIVE AND OBJECTIVE BOX
Subjective: Pt resting in bed comfortably, no acute evetns overnight. Pt deneis any Syncope, heachce, paresthesias loss of motor function, Chest pain, palpitations SOB, PND, Difficutly breathing Nausea, vomiting fevers chills,       Interval event as per CT Day NP  · Subjective and Objective:   Pt received from OR s/p transfemoral TAVR secondary to severe aortic stenosis  100% v-paced secondary to ventricular standstill. Pt did regain rhythm which appeared to be SR with very prolonged YUE, R BBB. Discussed with Dr Koo and decision made to proceed with PPM as pt very high risk for further conduction disturbances and is likely to need beta-blockade. Pt now s/p [place] PPM.  Prior to PPM insertion pt with c/o CP described as constant pressure and dissimilar to any previous pain she has had. Denies palpitations, "pinching sensation", N/V, abd pain, dizziness, diaphoresis.  Initial set of CE: CK 87, Troponin T 0.09, EKG unhelpful since is 100% V paced using TVPW via L femoral sheath. IV acetaminophen given without much relief. Stat bedside TTE done (reported as normal EF and no new WMAs). Of note, once pt's EPM turned off/intrinsic rhythm returned pain went away. Loulou Christy and Neri aware of plan/results. Repeat CE: , Troponin 0.12 (drawn post PPM insertion).  Pt with shock likely d/t hypovolemia (+ JOSHUA as seen on echo). Invasive hemodynamic monitoring with an A-line was required for the following of continuous MAP/BP monitoring to ensure adequate cardiovascular support and to evaluate for & help prevent decompensation while receiving intermittent volume expansion and titration of norepinephrine to maintain MAP >65.  Acute blood loss anemia, bilat groins (R femoral cutdown and L sheath) and PPM site soft and without hematoma. Distal motor/neuro/circ intact. PRBC x 2 now. Will continue to monitor/trend VS and H/H. Consider abd/pelvis CT (to r/o RP bleed) if no improvement.     Patient required more than the usual postoperative critical care management and I provided __50__ minutes of non-continuous care to the patient.  Discussed at length with the CTICU staff and helped coordinate care.    Dr Christy aware of events as noted above.      Electronic Signatures:  Greg Vaughan (CYNTHIA)  (Signed 09-Feb-2018 18:41)  	Authored: Progress Note, Subjective and Objective      Last Updated: 09-Feb-2018 18:41 by Greg Vaughan (CYNTHIA)                T(C): 36.9 (02-10-18 @ 02:00), Max: 37.1 (02-09-18 @ 16:40)  HR: 69 (02-10-18 @ 03:00) (49 - 80)  BP: 94/50 (02-09-18 @ 18:25) (91/50 - 159/76)  ABP: 134/43 (02-10-18 @ 03:00) (88/40 - 160/72)  ABP(mean): 74 (02-10-18 @ 03:00) (55 - 104)  RR: 23 (02-10-18 @ 03:00) (14 - 23)  SpO2: 97% (02-10-18 @ 03:00) (91% - 100%)  Wt(kg): --  CVP(mm Hg): --  CO: --  CI: --  PA: --      02-10    133<L>  |  98  |  15.0  ----------------------------<  123<H>  4.4   |  23.0  |  0.75    Ca    8.7      10 Feb 2018 02:44  Mg     2.3     02-10    TPro  5.3<L>  /  Alb  3.0<L>  /  TBili  0.9  /  DBili  x   /  AST  18  /  ALT  10  /  AlkPhos  78  02-09                               10.5   10.1  )-----------( 151      ( 10 Feb 2018 02:44 )             30.2        PT/INR - ( 09 Feb 2018 10:31 )   PT: 11.9 sec;   INR: 1.08 ratio         PTT - ( 09 Feb 2018 10:31 )  PTT:36.9 sec  ABG - ( 09 Feb 2018 23:07 )  pH: 7.42  /  pCO2: 38    /  pO2: 102   / HCO3: 24    / Base Excess: 0.1   /  SaO2: 98                                   CAPILLARY BLOOD GLUCOSE           CXR: Pending imaging and review by radiology     I&O's Detail    09 Feb 2018 07:01  -  10 Feb 2018 03:47  --------------------------------------------------------  IN:    Albumin 5%  - 250 mL: 750 mL    Lactated Ringers IV Bolus: 1000 mL    norepinephrine Infusion: 32 mL    norepinephrine Infusion: 6 mL    Oral Fluid: 660 mL    Packed Red Blood Cells: 607 mL    sodium chloride 0.9%: 50 mL    sodium chloride 0.9%.: 100 mL    Solution: 50 mL    Solution: 100 mL    Solution: 150 mL    Solution: 100 mL  Total IN: 3605 mL    OUT:    Indwelling Catheter - Urethral: 880 mL  Total OUT: 880 mL    Total NET: 2725 mL        Drug Dosing Weight  Height (cm): 157.48 (09 Feb 2018 05:51)  Weight (kg): 71 (09 Feb 2018 05:51)  BMI (kg/m2): 28.6 (09 Feb 2018 05:51)  BSA (m2): 1.72 (09 Feb 2018 05:51)    MEDICATIONS  (STANDING):  acetaminophen  IVPB. 1000 milliGRAM(s) IV Intermittent once  aspirin  chewable 81 milliGRAM(s) Oral daily  cefuroxime  IVPB 1500 milliGRAM(s) IV Intermittent every 8 hours  clopidogrel Tablet 75 milliGRAM(s) Oral daily  docusate sodium 100 milliGRAM(s) Oral three times a day  levothyroxine 100 MICROGram(s) Oral daily  metoprolol     tartrate 25 milliGRAM(s) Oral daily  sodium chloride 0.9% lock flush 3 milliLiter(s) IV Push every 8 hours  sodium chloride 0.9%. 1000 milliLiter(s) (10 mL/Hr) IV Continuous <Continuous>    MEDICATIONS  (PRN):      Physical Exam  Neuro: AAOx3 in NAD  Pulm: CTA b/l  CV: RRR, positive S1/S2  Abd: NT/ND, positive bowel sounds  Extremities: DP 2+, no pitting edema   Incision(s): b/l groin sites c/d/i       < from: TTE Echo Complete w/Doppler (02.09.18 @ 13:10) >  Summary:   1. Limited echo for LV function and pericardial effusion.   2. Left ventricular ejection fraction, by visual estimation, is 65 to   70%.   3. Systolic anterior motion of the anterior mitral valve leaflet without   septal contact. LVOT gradients not measured.   4. Normal right ventricular size and function.   5. Bioprosthesis in the aortic position. 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 No leak.   6. There is no evidence of pericardial effusion.    MD Natalya Electronically signed on 2/9/2018 at 3:13:54 PM         *** Final ***    < end of copied text >

## 2018-02-10 NOTE — PROGRESS NOTE ADULT - PROBLEM SELECTOR PLAN 7
s/p fluid resuscitation with crystalloid and PRBC x2  pt required levophed for hemodynamic support to maintain blood pressure and prevent end organ damage s/p fluid resuscitation with crystalloid and PRBC x2  pt required levophed for hemodynamic support to maintain blood pressure and prevent end organ damage  Case discussed with Dr. Christy. Pt required greater than 30 minutes of critical care. Review of labs, medication review and adjustment, titration of medication, physical exam and care for critically ill pt.

## 2018-02-10 NOTE — CHART NOTE - NSCHARTNOTEFT_GEN_A_CORE
7F L femoral sheath removed at bedside  25mins pressure held, sandbag left on top  no complications,  tolerated procedure well  bed rest x4hrs explained to KURT chávez and PT

## 2018-02-10 NOTE — CHART NOTE - NSCHARTNOTEFT_GEN_A_CORE
Pt PPM evaluated by med[employer] rep Breaux ([phone]) PPM set at DDD 60, pt with intrinisic rate higher than this during the night, PPM not capturing at acceptable setting. PPM now set to 7.5mV @2ms and lost capture 5.5mv @2ms. Dr. Koo made aware requesting stat echo this morning and will evaluate for need for adjustment of PPM. Pt stable at this time maintain intrinsic rhythm above 60. Pacing pads placed on pt and will leave left femoral venous line in case of need for atropine.

## 2018-02-11 ENCOUNTER — RESULT CHARGE (OUTPATIENT)
Age: 83
End: 2018-02-11

## 2018-02-11 ENCOUNTER — TRANSCRIPTION ENCOUNTER (OUTPATIENT)
Age: 83
End: 2018-02-11

## 2018-02-11 VITALS
DIASTOLIC BLOOD PRESSURE: 62 MMHG | HEART RATE: 61 BPM | RESPIRATION RATE: 33 BRPM | OXYGEN SATURATION: 96 % | SYSTOLIC BLOOD PRESSURE: 131 MMHG

## 2018-02-11 LAB
ANION GAP SERPL CALC-SCNC: 13 MMOL/L — SIGNIFICANT CHANGE UP (ref 5–17)
BUN SERPL-MCNC: 17 MG/DL — SIGNIFICANT CHANGE UP (ref 8–20)
CALCIUM SERPL-MCNC: 9.1 MG/DL — SIGNIFICANT CHANGE UP (ref 8.6–10.2)
CHLORIDE SERPL-SCNC: 99 MMOL/L — SIGNIFICANT CHANGE UP (ref 98–107)
CO2 SERPL-SCNC: 24 MMOL/L — SIGNIFICANT CHANGE UP (ref 22–29)
CREAT SERPL-MCNC: 0.77 MG/DL — SIGNIFICANT CHANGE UP (ref 0.5–1.3)
GLUCOSE SERPL-MCNC: 91 MG/DL — SIGNIFICANT CHANGE UP (ref 70–115)
HCT VFR BLD CALC: 34.9 % — LOW (ref 37–47)
HGB BLD-MCNC: 11.7 G/DL — LOW (ref 12–16)
MAGNESIUM SERPL-MCNC: 2.1 MG/DL — SIGNIFICANT CHANGE UP (ref 1.6–2.6)
MCHC RBC-ENTMCNC: 29.2 PG — SIGNIFICANT CHANGE UP (ref 27–31)
MCHC RBC-ENTMCNC: 33.5 G/DL — SIGNIFICANT CHANGE UP (ref 32–36)
MCV RBC AUTO: 87 FL — SIGNIFICANT CHANGE UP (ref 81–99)
PHOSPHATE SERPL-MCNC: 2.6 MG/DL — SIGNIFICANT CHANGE UP (ref 2.4–4.7)
PLATELET # BLD AUTO: 130 K/UL — LOW (ref 150–400)
POTASSIUM SERPL-MCNC: 4.1 MMOL/L — SIGNIFICANT CHANGE UP (ref 3.5–5.3)
POTASSIUM SERPL-SCNC: 4.1 MMOL/L — SIGNIFICANT CHANGE UP (ref 3.5–5.3)
RBC # BLD: 4.01 M/UL — LOW (ref 4.4–5.2)
RBC # FLD: 14.5 % — SIGNIFICANT CHANGE UP (ref 11–15.6)
SODIUM SERPL-SCNC: 136 MMOL/L — SIGNIFICANT CHANGE UP (ref 135–145)
WBC # BLD: 6.6 K/UL — SIGNIFICANT CHANGE UP (ref 4.8–10.8)
WBC # FLD AUTO: 6.6 K/UL — SIGNIFICANT CHANGE UP (ref 4.8–10.8)

## 2018-02-11 PROCEDURE — 71045 X-RAY EXAM CHEST 1 VIEW: CPT | Mod: 26

## 2018-02-11 PROCEDURE — 93306 TTE W/DOPPLER COMPLETE: CPT

## 2018-02-11 PROCEDURE — 85027 COMPLETE CBC AUTOMATED: CPT

## 2018-02-11 PROCEDURE — 75630 X-RAY AORTA LEG ARTERIES: CPT

## 2018-02-11 PROCEDURE — 86901 BLOOD TYPING SEROLOGIC RH(D): CPT

## 2018-02-11 PROCEDURE — 80053 COMPREHEN METABOLIC PANEL: CPT

## 2018-02-11 PROCEDURE — 82330 ASSAY OF CALCIUM: CPT

## 2018-02-11 PROCEDURE — 93320 DOPPLER ECHO COMPLETE: CPT

## 2018-02-11 PROCEDURE — 84484 ASSAY OF TROPONIN QUANT: CPT

## 2018-02-11 PROCEDURE — 93458 L HRT ARTERY/VENTRICLE ANGIO: CPT

## 2018-02-11 PROCEDURE — 36415 COLL VENOUS BLD VENIPUNCTURE: CPT

## 2018-02-11 PROCEDURE — C1760: CPT

## 2018-02-11 PROCEDURE — 93005 ELECTROCARDIOGRAM TRACING: CPT

## 2018-02-11 PROCEDURE — C1889: CPT

## 2018-02-11 PROCEDURE — 76000 FLUOROSCOPY <1 HR PHYS/QHP: CPT

## 2018-02-11 PROCEDURE — 93312 ECHO TRANSESOPHAGEAL: CPT

## 2018-02-11 PROCEDURE — 84100 ASSAY OF PHOSPHORUS: CPT

## 2018-02-11 PROCEDURE — C1769: CPT

## 2018-02-11 PROCEDURE — 85014 HEMATOCRIT: CPT

## 2018-02-11 PROCEDURE — 82550 ASSAY OF CK (CPK): CPT

## 2018-02-11 PROCEDURE — C1894: CPT

## 2018-02-11 PROCEDURE — 71045 X-RAY EXAM CHEST 1 VIEW: CPT

## 2018-02-11 PROCEDURE — 86900 BLOOD TYPING SEROLOGIC ABO: CPT

## 2018-02-11 PROCEDURE — 82435 ASSAY OF BLOOD CHLORIDE: CPT

## 2018-02-11 PROCEDURE — C1887: CPT

## 2018-02-11 PROCEDURE — 84132 ASSAY OF SERUM POTASSIUM: CPT

## 2018-02-11 PROCEDURE — 85610 PROTHROMBIN TIME: CPT

## 2018-02-11 PROCEDURE — 85730 THROMBOPLASTIN TIME PARTIAL: CPT

## 2018-02-11 PROCEDURE — 80048 BASIC METABOLIC PNL TOTAL CA: CPT

## 2018-02-11 PROCEDURE — 82553 CREATINE MB FRACTION: CPT

## 2018-02-11 PROCEDURE — 86850 RBC ANTIBODY SCREEN: CPT

## 2018-02-11 PROCEDURE — P9016: CPT

## 2018-02-11 PROCEDURE — 93308 TTE F-UP OR LMTD: CPT

## 2018-02-11 PROCEDURE — 83605 ASSAY OF LACTIC ACID: CPT

## 2018-02-11 PROCEDURE — 33210 INSERT ELECTRD/PM CATH SNGL: CPT

## 2018-02-11 PROCEDURE — 82947 ASSAY GLUCOSE BLOOD QUANT: CPT

## 2018-02-11 PROCEDURE — 84295 ASSAY OF SERUM SODIUM: CPT

## 2018-02-11 PROCEDURE — 83735 ASSAY OF MAGNESIUM: CPT

## 2018-02-11 PROCEDURE — 33208 INSRT HEART PM ATRIAL & VENT: CPT | Mod: KX

## 2018-02-11 PROCEDURE — C1785: CPT

## 2018-02-11 PROCEDURE — P9045: CPT

## 2018-02-11 PROCEDURE — C1898: CPT

## 2018-02-11 PROCEDURE — 86923 COMPATIBILITY TEST ELECTRIC: CPT

## 2018-02-11 PROCEDURE — 82803 BLOOD GASES ANY COMBINATION: CPT

## 2018-02-11 PROCEDURE — 93325 DOPPLER ECHO COLOR FLOW MAPG: CPT

## 2018-02-11 PROCEDURE — 36430 TRANSFUSION BLD/BLD COMPNT: CPT

## 2018-02-11 RX ORDER — CLOPIDOGREL BISULFATE 75 MG/1
75 TABLET, FILM COATED ORAL DAILY
Qty: 0 | Refills: 0 | Status: DISCONTINUED | OUTPATIENT
Start: 2018-02-11 | End: 2018-02-11

## 2018-02-11 RX ORDER — ATENOLOL 25 MG/1
25 TABLET ORAL ONCE
Qty: 0 | Refills: 0 | Status: DISCONTINUED | OUTPATIENT
Start: 2018-02-11 | End: 2018-02-11

## 2018-02-11 RX ORDER — MUPIROCIN 20 MG/G
1 OINTMENT TOPICAL
Qty: 0 | Refills: 0 | COMMUNITY

## 2018-02-11 RX ORDER — METOPROLOL TARTRATE 50 MG
25 TABLET ORAL
Qty: 0 | Refills: 0 | Status: DISCONTINUED | OUTPATIENT
Start: 2018-02-11 | End: 2018-02-11

## 2018-02-11 RX ORDER — METOPROLOL TARTRATE 50 MG
25 TABLET ORAL ONCE
Qty: 0 | Refills: 0 | Status: COMPLETED | OUTPATIENT
Start: 2018-02-11 | End: 2018-02-11

## 2018-02-11 RX ORDER — METOPROLOL TARTRATE 50 MG
1 TABLET ORAL
Qty: 30 | Refills: 1
Start: 2018-02-11 | End: 2018-04-11

## 2018-02-11 RX ORDER — SODIUM CHLORIDE 9 MG/ML
250 INJECTION INTRAMUSCULAR; INTRAVENOUS; SUBCUTANEOUS ONCE
Qty: 0 | Refills: 0 | Status: COMPLETED | OUTPATIENT
Start: 2018-02-11 | End: 2018-02-11

## 2018-02-11 RX ORDER — ASPIRIN/CALCIUM CARB/MAGNESIUM 324 MG
1 TABLET ORAL
Qty: 0 | Refills: 0 | DISCHARGE
Start: 2018-02-11

## 2018-02-11 RX ORDER — METOPROLOL TARTRATE 50 MG
50 TABLET ORAL
Qty: 0 | Refills: 0 | Status: DISCONTINUED | OUTPATIENT
Start: 2018-02-11 | End: 2018-02-11

## 2018-02-11 RX ORDER — CLOPIDOGREL BISULFATE 75 MG/1
1 TABLET, FILM COATED ORAL
Qty: 30 | Refills: 2
Start: 2018-02-11 | End: 2018-05-11

## 2018-02-11 RX ORDER — METOPROLOL TARTRATE 50 MG
1 TABLET ORAL
Qty: 0 | Refills: 0 | COMMUNITY

## 2018-02-11 RX ORDER — AMLODIPINE BESYLATE 2.5 MG/1
1 TABLET ORAL
Qty: 0 | Refills: 0 | COMMUNITY

## 2018-02-11 RX ORDER — AMLODIPINE BESYLATE 2.5 MG/1
5 TABLET ORAL DAILY
Qty: 0 | Refills: 0 | Status: DISCONTINUED | OUTPATIENT
Start: 2018-02-11 | End: 2018-02-11

## 2018-02-11 RX ORDER — METOPROLOL TARTRATE 50 MG
1 TABLET ORAL
Qty: 30 | Refills: 1 | OUTPATIENT
Start: 2018-02-11 | End: 2018-04-11

## 2018-02-11 RX ORDER — POTASSIUM CHLORIDE 20 MEQ
20 PACKET (EA) ORAL ONCE
Qty: 0 | Refills: 0 | Status: COMPLETED | OUTPATIENT
Start: 2018-02-11 | End: 2018-02-11

## 2018-02-11 RX ORDER — CLOPIDOGREL BISULFATE 75 MG/1
1 TABLET, FILM COATED ORAL
Qty: 30 | Refills: 2 | OUTPATIENT
Start: 2018-02-11 | End: 2018-05-11

## 2018-02-11 RX ORDER — ASPIRIN/CALCIUM CARB/MAGNESIUM 324 MG
81 TABLET ORAL DAILY
Qty: 0 | Refills: 0 | Status: DISCONTINUED | OUTPATIENT
Start: 2018-02-11 | End: 2018-02-11

## 2018-02-11 RX ADMIN — Medication 81 MILLIGRAM(S): at 11:13

## 2018-02-11 RX ADMIN — Medication 100 MILLIGRAM(S): at 06:38

## 2018-02-11 RX ADMIN — PANTOPRAZOLE SODIUM 40 MILLIGRAM(S): 20 TABLET, DELAYED RELEASE ORAL at 07:38

## 2018-02-11 RX ADMIN — Medication 100 MILLIGRAM(S): at 14:27

## 2018-02-11 RX ADMIN — Medication 100 MILLIGRAM(S): at 07:38

## 2018-02-11 RX ADMIN — AMLODIPINE BESYLATE 5 MILLIGRAM(S): 2.5 TABLET ORAL at 07:38

## 2018-02-11 RX ADMIN — Medication 25 MILLIGRAM(S): at 11:13

## 2018-02-11 RX ADMIN — Medication 20 MILLIEQUIVALENT(S): at 11:13

## 2018-02-11 RX ADMIN — SODIUM CHLORIDE 500 MILLILITER(S): 9 INJECTION INTRAMUSCULAR; INTRAVENOUS; SUBCUTANEOUS at 11:15

## 2018-02-11 RX ADMIN — CLOPIDOGREL BISULFATE 75 MILLIGRAM(S): 75 TABLET, FILM COATED ORAL at 11:13

## 2018-02-11 RX ADMIN — SODIUM CHLORIDE 3 MILLILITER(S): 9 INJECTION INTRAMUSCULAR; INTRAVENOUS; SUBCUTANEOUS at 13:58

## 2018-02-11 RX ADMIN — Medication 100 MICROGRAM(S): at 07:38

## 2018-02-11 RX ADMIN — SODIUM CHLORIDE 3 MILLILITER(S): 9 INJECTION INTRAMUSCULAR; INTRAVENOUS; SUBCUTANEOUS at 06:15

## 2018-02-11 RX ADMIN — Medication 100 MILLIGRAM(S): at 17:31

## 2018-02-11 NOTE — DISCHARGE NOTE ADULT - CARE PROVIDERS DIRECT ADDRESSES
[email or web address]

## 2018-02-11 NOTE — PROGRESS NOTE ADULT - ASSESSMENT
85 yo female PMH Severe AS, hypertension, hyperlipidemia, hypothyroidism, traumatic subarachnoid hemorrhage after a fall in 2015 which required drainage.  now s/p OSIRIS 2/9 via right perc, BS PPM placed 2/9.     Events 2/9  100% v-paced secondary to ventricular standstill. Pt did regain rhythm which appeared to be SR with very prolonged YUE, R BBB. Discussed with Dr Koo and decision made to proceed with PPM as pt very high risk for further conduction disturbances and is likely to need beta-blockade. Pt now s/p [place] PPM.  Prior to PPM insertion pt with c/o CP described as constant pressure and dissimilar to any previous pain she has had. EKG with Vpaced rythm using TVPW, initital CE CK 87, Trop 0.09 repeat Cardiac enzymes , Troponin 0.12 (drawn post PPM insertion). Chest pain self resolved.   TTE(+ JOSHUA as seen on echo without septal contact, LVOT gradient not measured)  Pt received PRBC x2 for Hct 24.8, repeat CBC with Hct 31.7

## 2018-02-11 NOTE — PROGRESS NOTE ADULT - SUBJECTIVE AND OBJECTIVE BOX
pt seen and examined, no complaints on exam.   pt s/p PRBC , now weaned off pressors  pt left chest wall site c/d/i  PPM post op ABX extended for 7 days for prevention and prophylaxis     cefuroxime  IVPB 1500 milliGRAM(s) IV Intermittent every 8 hours  docusate sodium 100 milliGRAM(s) Oral three times a day  doxycycline hyclate Capsule 100 milliGRAM(s) Oral every 12 hours  levothyroxine 100 MICROGram(s) Oral daily  pantoprazole    Tablet 40 milliGRAM(s) Oral before breakfast  phenylephrine    Infusion 0.376 MICROgram(s)/kG/Min IV Continuous <Continuous>  sodium chloride 0.9% lock flush 3 milliLiter(s) IV Push every 8 hours  sodium chloride 0.9%. 1000 milliLiter(s) IV Continuous <Continuous>                            9.3    7.9   )-----------( 108      ( 10 Feb 2018 17:55 )             27.6       Hemoglobin: 9.3 g/dL (02-10 @ 17:55)  Hemoglobin: 9.0 g/dL (02-10 @ 16:04)  Hemoglobin: 10.5 g/dL (02-10 @ 02:44)  Hemoglobin: 10.8 g/dL (02-09 @ 22:46)  Hemoglobin: 8.5 g/dL (02-09 @ 18:14)      02-10    133<L>  |  98  |  15.0  ----------------------------<  123<H>  4.4   |  23.0  |  0.75    Ca    8.7      10 Feb 2018 02:44  Mg     2.3     02-10    TPro  5.3<L>  /  Alb  3.0<L>  /  TBili  0.9  /  DBili  x   /  AST  18  /  ALT  10  /  AlkPhos  78  02-09    Creatinine Trend: 0.75<--, 0.78<--, 0.76<--    COAGS:     CARDIAC MARKERS ( 10 Feb 2018 02:44 )  x     / 0.29 ng/mL / 194 U/L / x     / 18.1 ng/mL  CARDIAC MARKERS ( 09 Feb 2018 22:46 )  x     / 0.22 ng/mL / 177 U/L / x     / 17.6 ng/mL  CARDIAC MARKERS ( 09 Feb 2018 17:24 )  x     / 0.12 ng/mL / 119 U/L / x     / x      CARDIAC MARKERS ( 09 Feb 2018 10:31 )  x     / 0.09 ng/mL / 87 U/L / x     / x            T(C): 36.9 (02-10-18 @ 22:15), Max: 37.1 (02-10-18 @ 06:30)  HR: 68 (02-11-18 @ 00:15) (54 - 83)  BP: 109/56 (02-11-18 @ 00:15) (87/50 - 147/66)  RR: 22 (02-11-18 @ 00:15) (9 - 33)  SpO2: 93% (02-11-18 @ 00:15) (88% - 100%)  Wt(kg): --    I&O's Summary    09 Feb 2018 07:01  -  10 Feb 2018 07:00  --------------------------------------------------------  IN: 3795 mL / OUT: 1070 mL / NET: 2725 mL    10 Feb 2018 07:01  -  11 Feb 2018 01:48  --------------------------------------------------------  IN: 1873 mL / OUT: 1060 mL / NET: 813 mL              Physical Exam  Neuro: AAOx3 in NAD  Pulm: CTA b/l  CV: RRR, positive S1/S2  Abd: NT/ND, positive bowel sounds  Extremities: DP 2+, no pitting edema   Incision(s): b/l groin sites c/d/i       < from: TTE Echo Complete w/Doppler (02.09.18 @ 13:10) >  Summary:   1. Limited echo for LV function and pericardial effusion.   2. Left ventricular ejection fraction, by visual estimation, is 65 to   70%.   3. Systolic anterior motion of the anterior mitral valve leaflet without   septal contact. LVOT gradients not measured.   4. Normal right ventricular size and function.   5. Bioprosthesis in the aortic position. 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 No leak.   6. There is no evidence of pericardial effusion.    MD Natalya Electronically signed on 2/9/2018 at 3:13:54 PM         *** Final ***    < end of copied text >

## 2018-02-11 NOTE — PROGRESS NOTE ADULT - PROBLEM SELECTOR PROBLEM 6
CAD (coronary artery disease), native coronary artery
CAD (coronary artery disease), native coronary artery

## 2018-02-11 NOTE — PROVIDER CONTACT NOTE (EICU) - BACKGROUND
75 year old male with dementia, Lung cancer SCC, s/p renal transplant who p/w fever and sob. Noted to have RSV. RRT called for resp distress. Brought to ICU for airway monitoring on NIV.

## 2018-02-11 NOTE — DISCHARGE NOTE ADULT - MEDICATION SUMMARY - MEDICATIONS TO STOP TAKING
I will STOP taking the medications listed below when I get home from the hospital:    amLODIPine 5 mg oral tablet  -- 1 tab(s) by mouth once a day    Bactroban 2% nasal ointment  -- 1 application into nose 2 times a day    Please apply to both nostrils     Please use for 10 days thank you

## 2018-02-11 NOTE — DISCHARGE NOTE ADULT - REASON FOR ADMISSION
Severe Aortic Stenosis s/p TAVR 2/11/18  Complete Heart Block s/p Dual Chamber [place] [employer] PPM 2/9/18 s/p PPM Revision 2/10/18

## 2018-02-11 NOTE — DISCHARGE NOTE ADULT - CARE PLAN
Principal Discharge DX:	Severe aortic stenosis by prior echocardiogram  Goal:	To recover from surgery.  Assessment and plan of treatment:	Please follow up with Dr. Christy within two weeks by calling [phone] on Monday February 12, 2018 to make an appointment. Continue to take your medications as stated previously.  - Keep the groin site clean and dry.  You may shower and pat the site dry.  - Watch the site for signs of redness or drainage, these should be reported to your doctor immediately.  - You will receive a wallet card about your new valve in the mail.  Please carry it with you to present to anyone who may ask if you have any medical implants.  - Be sure to inform your doctors including your dentist about your valve since you will need to take antibiotics to reduce the risk of infection before certain medical and dental procedures.  - You will be given an appointment to follow up with your doctor in approximately 4 weeks.  It is important to keep this appointment so that your new valve can be assessed.  - You may resume all your normal activities.  Secondary Diagnosis:	Complete heart block, post-surgical  Goal:	Continue taking your antibiotics and follow directions as stated below. Principal Discharge DX:	Severe aortic stenosis by prior echocardiogram  Goal:	To recover from surgery.  Assessment and plan of treatment:	Please follow up with Dr. Christy within two weeks by calling [phone] on Monday February 12, 2018 to make an appointment. Continue to take your medications as stated previously.  - Keep the groin site clean and dry.  You may shower and pat the site dry.  - Watch the site for signs of redness or drainage, these should be reported to your doctor immediately.  - You will receive a wallet card about your new valve in the mail.  Please carry it with you to present to anyone who may ask if you have any medical implants.  - Be sure to inform your doctors including your dentist about your valve since you will need to take antibiotics to reduce the risk of infection before certain medical and dental procedures.  - You will be given an appointment to follow up with your doctor in approximately 4 weeks.  It is important to keep this appointment so that your new valve can be assessed.  - You may resume all your normal activities.  Secondary Diagnosis:	Complete heart block, post-surgical  Goal:	Continue taking your antibiotics and follow directions as stated below.  Assessment and plan of treatment:	1. Make an appointment to follow up with Dr. Koo regarding PPM by calling [phone]  2. Keep affected arm below shoulder, with no heavy lifting for 6 weeks.    3. Keep the site dry (no showers) for 5 days.    4. Leave the sutures in place. They will be removed at your follow up appointment.   5. Please notify the physician if there is any bleeding, drainage or swelling of the site.    6. Please notify the physician of any fever greater than 100.4.  7. Complete your antibiotic. Principal Discharge DX:	Severe aortic stenosis by prior echocardiogram  Goal:	To recover from surgery.  Assessment and plan of treatment:	Please follow up with Dr. Christy within two weeks by calling [phone] on Monday February 12, 2018 to make an appointment. Continue to take your medications as stated previously.  - Keep the groin site clean and dry.  You may shower and pat the site dry.  - Watch the site for signs of redness or drainage, these should be reported to your doctor immediately.  - You will receive a wallet card about your new valve in the mail.  Please carry it with you to present to anyone who may ask if you have any medical implants.  - Be sure to inform your doctors including your dentist about your valve since you will need to take antibiotics to reduce the risk of infection before certain medical and dental procedures.  - You will be given an appointment to follow up with your doctor in approximately 4 weeks.  It is important to keep this appointment so that your new valve can be assessed.  - You may resume all your normal activities.  Secondary Diagnosis:	Complete heart block, post-surgical  Goal:	Continue taking your antibiotics and follow directions as stated below.  Assessment and plan of treatment:	1. Make an appointment to follow up with Dr. Koo regarding PPM by calling [phone]  2. Keep affected arm below shoulder, with no heavy lifting for 6 weeks.    3. Keep the site dry (no showers) for 5 days.    4. Leave the sutures in place. They will be removed at your follow up appointment.   5. Please notify the physician if there is any bleeding, drainage or swelling of the site.    6. Please notify the physician of any fever greater than 100.4.  7. Complete your antibiotic.  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  Assessment and plan of treatment:	Please follow up with your Cardiologist and Primary Care Physician 2-4 weeks from discharge.

## 2018-02-11 NOTE — DISCHARGE NOTE ADULT - PATIENT PORTAL LINK FT
You can access the [facility] Patient Portal, offered by [facility], by registering with the following website: [email or web address]

## 2018-02-11 NOTE — PROGRESS NOTE ADULT - PROBLEM SELECTOR PLAN 2
restart home lopressor  Diuresis prn  trend BUN/Cr, monitor UOP in critically ill pt  f/u AM cxr evaluate for any pulmonary congestion especially given the fact that she required blood transfusion
restart home lopressor  Diuresis prn  trend BUN/Cr, monitor UOP in critically ill pt  f/u AM cxr evaluate for any pulmonary congestion especially given the fact that she required blood transfusion

## 2018-02-11 NOTE — PROGRESS NOTE ADULT - PROBLEM SELECTOR PLAN 3
s/p BS PPM for ventricular standstill and new RBBB with prolong MI interval
s/p BS PPM for ventricular standstill and new RBBB with prolong IA interval

## 2018-02-11 NOTE — DISCHARGE NOTE ADULT - SECONDARY DIAGNOSIS.
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

## 2018-02-11 NOTE — PROGRESS NOTE ADULT - PROBLEM SELECTOR PLAN 1
s/p OSIRIS 2/9 via right perc  continue home lopressor  pt refusing Asa as she reports it bothers her stomach pt made aware of risk of stroke if she does not take Asa, will revisit conversation and benefit of taking asa again. Pt encouraged to take medication but adamantly refusing at this time. Pt was amenable to taking plavix.   TTE with evidence of JOSHUA without septal contact   pt required levophed and 2 prbc's for hypovolemic shock
s/p OSIRIS 2/9 via right perc  continue home lopressor  pt refusing Asa as she reports it bothers her stomach pt made aware of risk of stroke if she does not take Asa, will revisit conversation and benefit of taking asa again. Pt encouraged to take medication but adamantly refusing at this time. Pt was amenable to taking plavix.   TTE with evidence of JOSHUA without septal contact   pt required levophed and 2 prbc's for hypovolemic shock

## 2018-02-11 NOTE — DISCHARGE NOTE ADULT - CARE PROVIDER_API CALL
Jose R Christy), Surgery; Thoracic and Cardiac Surgery  [address] [address]  Phone: [phone]  Fax: [phone]    Shon Koo (MD; MPH), Cardiac Electrophysiology; Cardiovascular Disease; Internal Medicine  [address] [place] [id number]  Phone: [phone]  Fax: [phone]

## 2018-02-11 NOTE — DISCHARGE NOTE ADULT - MEDICATION SUMMARY - MEDICATIONS TO TAKE
I will START or STAY ON the medications listed below when I get home from the hospital:    aspirin 81 mg oral delayed release tablet  -- 1 tab(s) by mouth once a day  -- Indication: For Heart Healthy    doxycycline monohydrate 100 mg oral capsule  -- 1 cap(s) by mouth every 12 hours  -- Indication: For Pacemaker infection ppx    clopidogrel 75 mg oral tablet  -- 1 tab(s) by mouth once a day   -- Indication: For TAVR    Metoprolol Succinate ER 25 mg oral tablet, extended release  -- 1 tab(s) by mouth once a day   -- Indication: For Heart rate control    glucosamine hydrochloride 1500 mg oral tablet  -- 1 tab(s) by mouth once a day  -- Indication: For Osteoarthritis    chondroitin  -- 1200 milligram(s) by mouth once a day  -- Indication: For Osteoarthritis    omeprazole 40 mg oral delayed release capsule  -- 1 cap(s) by mouth 2 times a day  -- Indication: For GERD    Synthroid 100 mcg (0.1 mg) oral tablet  -- 1 tab(s) by mouth once a day  -- Indication: For Hypothyroidism    B Complex 50 oral tablet, extended release  -- 1 tab(s) by mouth once a day  -- Indication: For Supplement

## 2018-02-11 NOTE — PROGRESS NOTE ADULT - PROBLEM SELECTOR PLAN 7
s/p fluid resuscitation with crystalloid and PRBC x2    Case discussed with Dr. Christy. Pt required greater than 30 minutes of critical care. Review of labs, medication review and adjustment, titration of medication, physical exam and care for critically ill pt.

## 2018-02-11 NOTE — DISCHARGE NOTE ADULT - INSTRUCTIONS
A registered dietician can help you create a personalized plan for healthy eating. Make your calories count by choosin. Healthy carbohydrates such as fruits, vegetables, whole grains, legumes (beans, peas and lentils) and low-fat dairy products.  2. Fiber-rich foods such as vegetables, fruits, nuts, legumes, whole-wheat flour and wheat bran.  3. Heart-healthy fish at least twice a week such as cod, tuna and halibut, which are low-fat options as well as salmon, mackerel, tuna, sardines and bluefish, which are rich in omega-3 fatty acids. Avoid fish with high levels of mercury.  4. "Good" fats such as avocados, almonds, pecans, walnuts, olives and canola, olive and peanut oils.     Minimize foods with high saturated fats (beef, hot dogs, sausage and hunter), trans fats (processed snacks, baked goods, shortening and stick margarines), high cholesterol foods (high fat dairy products and animal proteins, fried food) and high sodium foods (fast food, many frozen foods, processed food).

## 2018-02-11 NOTE — DISCHARGE NOTE ADULT - HOSPITAL COURSE
Mrs. 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 Patient brought to ICU with pacing requirements. In ICU, patient complaining of chest pain, TTE was repeated to exclude ischemia, at which time dual chamber [employer] PPM was placed. Patient did require two unit PRBC transfusion secondary to vasopressor requirement. POD#1 PPM was interrogated, found to not be functioning appropriately with current settings, found to have inadequate capture requiring return to OR for PPM revision for both atrial and ventricular leads. POD#3 PPM once again interrogated and set at VVI 50. Prior to discharge, patient in intermittently pacing, Dr. Koo aware and okay with discharge. Case reviewed with Dr. Christy, per attending physician on record patient is hemodynamically stable for discharge with follow up.    POD#1 Complete ECHO: In summary (official report in EMR): Hyperdynamic global LV systolic function > 75% with grade II diastolic dysfunction. Degenerative mitral valve disease with severe MAC and mild MR  with mean transmitral gradient of 6 mmHg at HR of 62 BPM. S/P Perry TAVR, prosthesis in place without rocking motion, transaortic gradient 14.6 mmHg, mean gradient 8.8 mmHg, No evidence of transvalvular or paravalvular skylar. No pericardial effusion noted. Mrs. 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 Patient brought to ICU with pacing requirements. In ICU, patient complaining of chest pain, TTE was repeated to exclude ischemia, at which time dual chamber [employer] PPM was placed. Patient did require two unit PRBC transfusion secondary to vasopressor requirement. POD#1 PPM was interrogated, found to not be functioning appropriately with current settings, found to have inadequate capture requiring return to OR for PPM revision for both atrial and ventricular leads. POD#3 PPM once again interrogated and set at VVI 50. Prior to discharge, patient in intermittently pacing, Dr. Koo aware and okay with discharge. Prior to discharge, patient had minimal trial void, discussed with attending physician. Case reviewed with Dr. Christy, per attending physician on record patient is hemodynamically stable for discharge with follow up.    POD#1 Complete ECHO: In summary (official report in EMR): Hyperdynamic global LV systolic function > 75% with grade II diastolic dysfunction. Degenerative mitral valve disease with severe MAC and mild MR  with mean transmitral gradient of 6 mmHg at HR of 62 BPM. S/P Perry TAVR, prosthesis in place without rocking motion, transaortic gradient 14.6 mmHg, mean gradient 8.8 mmHg, No evidence of transvalvular or paravalvular skylar. No pericardial effusion noted. Mrs. 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 Patient brought to ICU with pacing requirements. In ICU, patient complaining of chest pain, TTE was repeated to exclude ischemia, at which time dual chamber [employer] PPM was placed. Patient did require two unit PRBC transfusion secondary to vasopressor requirement. POD#1 PPM was interrogated, found to not be functioning appropriately with current settings, found to have inadequate capture requiring return to OR for PPM revision for both atrial and ventricular leads. POD#3 PPM once again interrogated and set at VVI 50. Prior to discharge, patient in intermittently pacing, Dr. Koo aware and okay with discharge. Prior to discharge, patient had minimal trial void, discussed with attending physician. Case reviewed with Dr. Christy, per attending physician on record patient is hemodynamically stable for discharge with follow up.    Patient entered into the STS/ACC TVT Registry, ID# [id number].    POD#1 Complete ECHO: In summary (official report in EMR): Hyperdynamic global LV systolic function > 75% with grade II diastolic dysfunction. Degenerative mitral valve disease with severe MAC and mild MR  with mean transmitral gradient of 6 mmHg at HR of 62 BPM. S/P Perry TAVR, prosthesis in place without rocking motion, transaortic gradient 14.6 mmHg, mean gradient 8.8 mmHg, No evidence of transvalvular or paravalvular skylar. No pericardial effusion noted.

## 2018-02-11 NOTE — DISCHARGE NOTE ADULT - ADDITIONAL INSTRUCTIONS
Please follow up with Dr. Christy within two weeks by calling [phone] on Monday February 12, 2018 to make an appointment.  Please follow up with Dr. Koo within two weeks by calling ______________________________.  The cardiac surgery office is on the second floor of [facility].   Take the [place] ("G") elevators to 2 and make a left.   Walk down to the second hallway on your left (before the double doors).   Sign says Cardiovascular and Thoracic Surgery.  The waiting room is on your left.  Please call the Cardiothoracic Surgery office at [phone] if you are experiencing any shortness of breath, chest pain, fevers or chills, drainage from the incisions, persistent nausea, vomiting or if you have any questions about your medications. If the symptoms are severe, call [phone] and go to the nearest hospital. Please follow up with Dr. Christy within two weeks by calling [phone] on Monday February 12, 2018 to make an appointment.  Please follow up with Dr. Koo within two weeks by calling [phone].  The cardiac surgery office is on the second floor of [facility].   Take the [place] ("G") elevators to 2 and make a left.   Walk down to the second hallway on your left (before the double doors).   Sign says Cardiovascular and Thoracic Surgery.  The waiting room is on your left.  Please call the Cardiothoracic Surgery office at [phone] if you are experiencing any shortness of breath, chest pain, fevers or chills, drainage from the incisions, persistent nausea, vomiting or if you have any questions about your medications. If the symptoms are severe, call [phone] and go to the nearest hospital.

## 2018-02-11 NOTE — DISCHARGE NOTE ADULT - PLAN OF CARE
To recover from surgery. Please follow up with Dr. Christy within two weeks by calling [phone] on Monday February 12, 2018 to make an appointment. Continue to take your medications as stated previously.  - Keep the groin site clean and dry.  You may shower and pat the site dry.  - Watch the site for signs of redness or drainage, these should be reported to your doctor immediately.  - You will receive a wallet card about your new valve in the mail.  Please carry it with you to present to anyone who may ask if you have any medical implants.  - Be sure to inform your doctors including your dentist about your valve since you will need to take antibiotics to reduce the risk of infection before certain medical and dental procedures.  - You will be given an appointment to follow up with your doctor in approximately 4 weeks.  It is important to keep this appointment so that your new valve can be assessed.  - You may resume all your normal activities. Continue taking your antibiotics and follow directions as stated below. 1. Make an appointment to follow up with Dr. Koo regarding PPM by calling [phone]  2. Keep affected arm below shoulder, with no heavy lifting for 6 weeks.    3. Keep the site dry (no showers) for 5 days.    4. Leave the sutures in place. They will be removed at your follow up appointment.   5. Please notify the physician if there is any bleeding, drainage or swelling of the site.    6. Please notify the physician of any fever greater than 100.4.  7. Complete your antibiotic. Please follow up with your Cardiologist and Primary Care Physician 2-4 weeks from discharge.

## 2018-02-12 PROCEDURE — 93355 ECHO TRANSESOPHAGEAL (TEE): CPT | Mod: 26

## 2018-02-13 RX ORDER — MUPIROCIN 20 MG/G
2 OINTMENT TOPICAL TWICE DAILY
Qty: 1 | Refills: 0 | Status: DISCONTINUED | COMMUNITY
Start: 2018-02-08 | End: 2018-02-13

## 2018-02-13 RX ORDER — CLOPIDOGREL BISULFATE 75 MG/1
75 TABLET, FILM COATED ORAL
Qty: 90 | Refills: 0 | Status: DISCONTINUED | COMMUNITY
Start: 2018-02-08 | End: 2018-02-13

## 2018-02-14 ENCOUNTER — APPOINTMENT (OUTPATIENT)
Dept: CARDIOLOGY | Facility: CLINIC | Age: 83
End: 2018-02-14
Payer: MEDICARE

## 2018-02-14 PROCEDURE — 93325 DOPPLER ECHO COLOR FLOW MAPG: CPT

## 2018-02-14 PROCEDURE — 93321 DOPPLER ECHO F-UP/LMTD STD: CPT

## 2018-02-14 PROCEDURE — 93308 TTE F-UP OR LMTD: CPT

## 2018-02-28 ENCOUNTER — APPOINTMENT (OUTPATIENT)
Dept: ELECTROPHYSIOLOGY | Facility: CLINIC | Age: 83
End: 2018-02-28
Payer: MEDICARE

## 2018-02-28 VITALS
BODY MASS INDEX: 27.74 KG/M2 | DIASTOLIC BLOOD PRESSURE: 75 MMHG | OXYGEN SATURATION: 98 % | SYSTOLIC BLOOD PRESSURE: 187 MMHG | HEART RATE: 85 BPM | WEIGHT: 154.1 LBS

## 2018-02-28 PROCEDURE — 99024 POSTOP FOLLOW-UP VISIT: CPT

## 2018-03-06 ENCOUNTER — APPOINTMENT (OUTPATIENT)
Dept: CARDIOTHORACIC SURGERY | Facility: CLINIC | Age: 83
End: 2018-03-06
Payer: MEDICARE

## 2018-03-06 VITALS
RESPIRATION RATE: 16 BRPM | SYSTOLIC BLOOD PRESSURE: 178 MMHG | DIASTOLIC BLOOD PRESSURE: 98 MMHG | OXYGEN SATURATION: 96 % | HEART RATE: 74 BPM | BODY MASS INDEX: 28.34 KG/M2 | HEIGHT: 62 IN | WEIGHT: 154 LBS

## 2018-03-06 PROCEDURE — 99213 OFFICE O/P EST LOW 20 MIN: CPT

## 2018-03-14 ENCOUNTER — NON-APPOINTMENT (OUTPATIENT)
Age: 83
End: 2018-03-14

## 2018-03-14 ENCOUNTER — APPOINTMENT (OUTPATIENT)
Dept: CARDIOLOGY | Facility: CLINIC | Age: 83
End: 2018-03-14
Payer: MEDICARE

## 2018-03-14 VITALS
WEIGHT: 151 LBS | SYSTOLIC BLOOD PRESSURE: 151 MMHG | HEIGHT: 62 IN | OXYGEN SATURATION: 97 % | DIASTOLIC BLOOD PRESSURE: 93 MMHG | HEART RATE: 85 BPM | BODY MASS INDEX: 27.79 KG/M2

## 2018-03-14 PROCEDURE — 93000 ELECTROCARDIOGRAM COMPLETE: CPT

## 2018-03-14 PROCEDURE — 99214 OFFICE O/P EST MOD 30 MIN: CPT | Mod: 25

## 2018-03-14 RX ORDER — ASPIRIN 81 MG
81 TABLET, DELAYED RELEASE (ENTERIC COATED) ORAL DAILY
Qty: 90 | Refills: 3 | Status: DISCONTINUED | COMMUNITY
Start: 2018-02-06 | End: 2018-03-14

## 2018-03-19 ENCOUNTER — APPOINTMENT (OUTPATIENT)
Dept: FAMILY MEDICINE | Facility: CLINIC | Age: 83
End: 2018-03-19
Payer: MEDICARE

## 2018-03-19 VITALS
TEMPERATURE: 98.6 F | DIASTOLIC BLOOD PRESSURE: 80 MMHG | OXYGEN SATURATION: 99 % | SYSTOLIC BLOOD PRESSURE: 140 MMHG | BODY MASS INDEX: 28.06 KG/M2 | HEIGHT: 62 IN | WEIGHT: 152.5 LBS | HEART RATE: 83 BPM

## 2018-03-19 VITALS — DIASTOLIC BLOOD PRESSURE: 90 MMHG | HEART RATE: 80 BPM | SYSTOLIC BLOOD PRESSURE: 150 MMHG | RESPIRATION RATE: 16 BRPM

## 2018-03-19 PROCEDURE — 99214 OFFICE O/P EST MOD 30 MIN: CPT

## 2018-03-19 RX ORDER — DOXYCYCLINE 100 MG/1
100 TABLET, FILM COATED ORAL
Refills: 0 | Status: COMPLETED | COMMUNITY
End: 2018-03-19

## 2018-03-19 RX ORDER — DOXYCYCLINE 100 MG/1
100 CAPSULE ORAL
Qty: 18 | Refills: 0 | Status: COMPLETED | COMMUNITY
Start: 2018-02-12

## 2018-05-22 ENCOUNTER — APPOINTMENT (OUTPATIENT)
Dept: CARDIOLOGY | Facility: CLINIC | Age: 83
End: 2018-05-22
Payer: MEDICARE

## 2018-05-22 PROCEDURE — 93306 TTE W/DOPPLER COMPLETE: CPT

## 2018-05-23 ENCOUNTER — APPOINTMENT (OUTPATIENT)
Dept: ELECTROPHYSIOLOGY | Facility: CLINIC | Age: 83
End: 2018-05-23

## 2018-06-01 ENCOUNTER — APPOINTMENT (OUTPATIENT)
Dept: ELECTROPHYSIOLOGY | Facility: CLINIC | Age: 83
End: 2018-06-01
Payer: MEDICARE

## 2018-06-01 VITALS
DIASTOLIC BLOOD PRESSURE: 91 MMHG | HEIGHT: 62 IN | HEART RATE: 59 BPM | OXYGEN SATURATION: 98 % | SYSTOLIC BLOOD PRESSURE: 190 MMHG | BODY MASS INDEX: 28.52 KG/M2 | WEIGHT: 155 LBS

## 2018-06-01 PROCEDURE — 93280 PM DEVICE PROGR EVAL DUAL: CPT

## 2018-06-01 PROCEDURE — 99204 OFFICE O/P NEW MOD 45 MIN: CPT

## 2018-06-11 ENCOUNTER — RX RENEWAL (OUTPATIENT)
Age: 83
End: 2018-06-11

## 2018-06-18 ENCOUNTER — NON-APPOINTMENT (OUTPATIENT)
Age: 83
End: 2018-06-18

## 2018-06-18 ENCOUNTER — APPOINTMENT (OUTPATIENT)
Dept: CARDIOLOGY | Facility: CLINIC | Age: 83
End: 2018-06-18
Payer: MEDICARE

## 2018-06-18 VITALS
DIASTOLIC BLOOD PRESSURE: 84 MMHG | HEART RATE: 58 BPM | BODY MASS INDEX: 28.89 KG/M2 | WEIGHT: 157 LBS | OXYGEN SATURATION: 98 % | HEIGHT: 62 IN | SYSTOLIC BLOOD PRESSURE: 159 MMHG

## 2018-06-18 PROCEDURE — 99214 OFFICE O/P EST MOD 30 MIN: CPT | Mod: 25

## 2018-06-18 PROCEDURE — 93000 ELECTROCARDIOGRAM COMPLETE: CPT

## 2018-06-18 RX ORDER — ASPIRIN 81 MG
81 TABLET, DELAYED RELEASE (ENTERIC COATED) ORAL
Refills: 0 | Status: DISCONTINUED | COMMUNITY
End: 2018-06-18

## 2018-06-19 ENCOUNTER — APPOINTMENT (OUTPATIENT)
Dept: FAMILY MEDICINE | Facility: CLINIC | Age: 83
End: 2018-06-19
Payer: MEDICARE

## 2018-06-19 VITALS
HEIGHT: 62 IN | WEIGHT: 157.13 LBS | HEART RATE: 59 BPM | RESPIRATION RATE: 16 BRPM | SYSTOLIC BLOOD PRESSURE: 148 MMHG | DIASTOLIC BLOOD PRESSURE: 78 MMHG | OXYGEN SATURATION: 96 % | BODY MASS INDEX: 28.91 KG/M2

## 2018-06-19 VITALS — HEART RATE: 72 BPM | DIASTOLIC BLOOD PRESSURE: 90 MMHG | RESPIRATION RATE: 16 BRPM | SYSTOLIC BLOOD PRESSURE: 140 MMHG

## 2018-06-19 PROCEDURE — 36415 COLL VENOUS BLD VENIPUNCTURE: CPT

## 2018-06-19 PROCEDURE — 99214 OFFICE O/P EST MOD 30 MIN: CPT | Mod: 25

## 2018-06-19 NOTE — REVIEW OF SYSTEMS
[Fatigue] : no fatigue [Chest Pain] : no chest pain [Palpitations] : no palpitations [Shortness Of Breath] : no shortness of breath [Constipation] : no constipation [Diarrhea] : diarrhea [Dizziness] : no dizziness

## 2018-06-19 NOTE — PHYSICAL EXAM
[No Acute Distress] : no acute distress [Well Nourished] : well nourished [PERRL] : pupils equal round and reactive to light [Normal Oropharynx] : the oropharynx was normal [Supple] : supple [Clear to Auscultation] : lungs were clear to auscultation bilaterally [Regular Rhythm] : with a regular rhythm [No Murmur] : no murmur heard [No Edema] : there was no peripheral edema [Soft] : abdomen soft [Non Tender] : non-tender [No HSM] : no HSM [Normal Posterior Cervical Nodes] : no posterior cervical lymphadenopathy [Normal Anterior Cervical Nodes] : no anterior cervical lymphadenopathy [No Joint Swelling] : no joint swelling [No Rash] : no rash [Normal Gait] : normal gait [Normal Insight/Judgement] : insight and judgment were intact [de-identified] : RIGHT  SHOULDER  FROM  NO  POINT  TENDERNESS  [de-identified] : ECCHYMOSIS  CHIN

## 2018-06-19 NOTE — HISTORY OF PRESENT ILLNESS
[Coronary Artery Disease] : Coronary Artery Disease [Hyperlipidemia] : Hyperlipidemia [Hypertension] : Hypertension [Other: ___] : [unfilled]: [Does not check BP] : The patient is not checking blood pressure [<140/90] : Target blood pressure is <140/90 [Near target goal] : BP near target goal [No therapy] : Patient is not on statin therapy [Well Controlled] : Overall status has been well controlled [FreeTextEntry6] : DOING WELL PACE  FUNCTIONING  WELL .  FEEL 10  DAYS  AGO INJURED  RIGHT  SHOULDER TREATING WITH ICE  [Symptoms Limit Activities] : Symptoms do not limit ~his/her~ activities

## 2018-06-20 LAB
ALBUMIN SERPL ELPH-MCNC: 4.4 G/DL
ALP BLD-CCNC: 91 U/L
ALT SERPL-CCNC: 16 U/L
ANION GAP SERPL CALC-SCNC: 17 MMOL/L
APPEARANCE: CLEAR
AST SERPL-CCNC: 33 U/L
BACTERIA: NEGATIVE
BASOPHILS # BLD AUTO: 0.03 K/UL
BASOPHILS NFR BLD AUTO: 0.4 %
BILIRUB SERPL-MCNC: 0.5 MG/DL
BILIRUBIN URINE: NEGATIVE
BLOOD URINE: NEGATIVE
BUN SERPL-MCNC: 16 MG/DL
CALCIUM SERPL-MCNC: 9.7 MG/DL
CHLORIDE SERPL-SCNC: 97 MMOL/L
CHOLEST SERPL-MCNC: 257 MG/DL
CHOLEST/HDLC SERPL: 4.6 RATIO
CO2 SERPL-SCNC: 24 MMOL/L
COLOR: YELLOW
CREAT SERPL-MCNC: 1.04 MG/DL
EOSINOPHIL # BLD AUTO: 0.43 K/UL
EOSINOPHIL NFR BLD AUTO: 5.5 %
GLUCOSE QUALITATIVE U: NEGATIVE MG/DL
GLUCOSE SERPL-MCNC: 80 MG/DL
HCT VFR BLD CALC: 39.8 %
HDLC SERPL-MCNC: 56 MG/DL
HGB BLD-MCNC: 12.7 G/DL
HYALINE CASTS: 3 /LPF
IMM GRANULOCYTES NFR BLD AUTO: 0.4 %
KETONES URINE: NEGATIVE
LDLC SERPL CALC-MCNC: 174 MG/DL
LEUKOCYTE ESTERASE URINE: ABNORMAL
LYMPHOCYTES # BLD AUTO: 1.37 K/UL
LYMPHOCYTES NFR BLD AUTO: 17.5 %
MAN DIFF?: NORMAL
MCHC RBC-ENTMCNC: 29.1 PG
MCHC RBC-ENTMCNC: 31.9 GM/DL
MCV RBC AUTO: 91.1 FL
MICROSCOPIC-UA: NORMAL
MONOCYTES # BLD AUTO: 0.69 K/UL
MONOCYTES NFR BLD AUTO: 8.8 %
NEUTROPHILS # BLD AUTO: 5.28 K/UL
NEUTROPHILS NFR BLD AUTO: 67.4 %
NITRITE URINE: NEGATIVE
PH URINE: 6.5
PLATELET # BLD AUTO: 204 K/UL
POTASSIUM SERPL-SCNC: 5 MMOL/L
PROT SERPL-MCNC: 7.5 G/DL
PROTEIN URINE: NEGATIVE MG/DL
RBC # BLD: 4.37 M/UL
RBC # FLD: 14.3 %
RED BLOOD CELLS URINE: 3 /HPF
SODIUM SERPL-SCNC: 138 MMOL/L
SPECIFIC GRAVITY URINE: 1.01
SQUAMOUS EPITHELIAL CELLS: 6 /HPF
T4 SERPL-MCNC: 9.6 UG/DL
TRIGL SERPL-MCNC: 136 MG/DL
TSH SERPL-ACNC: 1.97 UIU/ML
URATE SERPL-MCNC: 4.9 MG/DL
UROBILINOGEN URINE: NEGATIVE MG/DL
WBC # FLD AUTO: 7.83 K/UL
WHITE BLOOD CELLS URINE: 2 /HPF

## 2018-06-25 NOTE — H&P PST ADULT - TOBACCO, LAST USE DATE, PROFILE
Chief Complaint(s): mid to lower back pain that intermittently radiates into bilateral gluteal.  Date of Injury: 18  Initial Treatment Date: 2018   Date Last Seen: 2018   Mechanism of Onset: suddenly  Occupation: none  Referred by:Ramirez Mae MD  Primary Care Physician: Ramirez Mae MD  Date informed consent signed:  2018   Donna  reports that she has never smoked. She has never used smokeless tobacco.  Donna is allergic to erythromycin; sulfa antibiotics; contrast media; diltiazem; honey; lisinopril; losartan; and penicillins.  Denies allergy to latex or sensitivity to latex.  Advance Directive Status:filed   Visit Number of Current Episode: 13  Discharged from care: No  Initial pain ratin/10 and when flared up 9/10    Relevant Diagnostic Imaging/Testing:   None     There were not vitals taken for this visit.  No changes in patient's medical or social history since their last visit.    SUBJECTIVE:  Donna is a pleasant 54 year old female that presents to our office today for a follow up evaluation and treatment of mid to lower back pain that intermittently radiates into bilateral gluteal. She rates her pain today at 5-6/10 with 10 being the worst. Donna was feeling pretty good since her last treatment, but this past weekend her pain increased again. She states that she has constant \"tiredness\". With standing or reaching she has a dull ache in her lower back. She had a hard time getting ready this morning. She has been using ice and heat to help manage her symptoms.                    Initial [employer]/[place] Pain Supplemental Questionnaire score was 29/40.                    08-Jan-1998

## 2018-08-06 ENCOUNTER — TRANSCRIPTION ENCOUNTER (OUTPATIENT)
Age: 83
End: 2018-08-06

## 2018-10-15 PROBLEM — R55 SYNCOPE AND COLLAPSE: Chronic | Status: ACTIVE | Noted: 2018-01-08

## 2018-10-15 PROBLEM — R32 UNSPECIFIED URINARY INCONTINENCE: Chronic | Status: ACTIVE | Noted: 2018-01-08

## 2018-10-15 PROBLEM — I35.0 NONRHEUMATIC AORTIC (VALVE) STENOSIS: Chronic | Status: ACTIVE | Noted: 2018-01-08

## 2018-10-15 PROBLEM — E86.0 DEHYDRATION: Chronic | Status: ACTIVE | Noted: 2018-01-08

## 2018-10-15 PROBLEM — R35.1 NOCTURIA: Chronic | Status: ACTIVE | Noted: 2018-01-08

## 2018-10-15 PROBLEM — I45.4 NONSPECIFIC INTRAVENTRICULAR BLOCK: Chronic | Status: ACTIVE | Noted: 2018-01-08

## 2018-10-15 PROBLEM — H04.123 DRY EYE SYNDROME OF BILATERAL LACRIMAL GLANDS: Chronic | Status: ACTIVE | Noted: 2018-01-08

## 2018-10-15 PROBLEM — E78.5 HYPERLIPIDEMIA, UNSPECIFIED: Chronic | Status: ACTIVE | Noted: 2018-01-08

## 2018-10-15 PROBLEM — M47.815: Chronic | Status: ACTIVE | Noted: 2018-01-08

## 2018-10-15 PROBLEM — I10 ESSENTIAL (PRIMARY) HYPERTENSION: Chronic | Status: ACTIVE | Noted: 2018-01-08

## 2018-10-15 PROBLEM — E03.9 HYPOTHYROIDISM, UNSPECIFIED: Chronic | Status: ACTIVE | Noted: 2018-01-08

## 2018-10-15 PROBLEM — M54.9 DORSALGIA, UNSPECIFIED: Chronic | Status: ACTIVE | Noted: 2018-01-08

## 2018-10-15 PROBLEM — I62.00 NONTRAUMATIC SUBDURAL HEMORRHAGE, UNSPECIFIED: Chronic | Status: ACTIVE | Noted: 2018-01-08

## 2018-10-15 PROBLEM — L57.0 ACTINIC KERATOSIS: Chronic | Status: ACTIVE | Noted: 2018-01-08

## 2018-10-15 PROBLEM — K21.9 GASTRO-ESOPHAGEAL REFLUX DISEASE WITHOUT ESOPHAGITIS: Chronic | Status: ACTIVE | Noted: 2018-01-08

## 2018-10-15 PROBLEM — H91.90 UNSPECIFIED HEARING LOSS, UNSPECIFIED EAR: Chronic | Status: ACTIVE | Noted: 2018-01-08

## 2018-10-15 PROBLEM — R53.83 OTHER FATIGUE: Chronic | Status: ACTIVE | Noted: 2018-01-08

## 2018-10-15 PROBLEM — I34.0 NONRHEUMATIC MITRAL (VALVE) INSUFFICIENCY: Chronic | Status: ACTIVE | Noted: 2018-01-08

## 2018-10-17 ENCOUNTER — APPOINTMENT (OUTPATIENT)
Dept: FAMILY MEDICINE | Facility: CLINIC | Age: 83
End: 2018-10-17
Payer: MEDICARE

## 2018-10-17 VITALS
TEMPERATURE: 98.3 F | RESPIRATION RATE: 16 BRPM | HEIGHT: 63.5 IN | HEART RATE: 83 BPM | BODY MASS INDEX: 27.3 KG/M2 | WEIGHT: 156 LBS | SYSTOLIC BLOOD PRESSURE: 150 MMHG | DIASTOLIC BLOOD PRESSURE: 70 MMHG | OXYGEN SATURATION: 98 %

## 2018-10-17 VITALS — SYSTOLIC BLOOD PRESSURE: 150 MMHG | HEART RATE: 72 BPM | RESPIRATION RATE: 16 BRPM | DIASTOLIC BLOOD PRESSURE: 80 MMHG

## 2018-10-17 PROCEDURE — 99214 OFFICE O/P EST MOD 30 MIN: CPT

## 2018-10-17 NOTE — PHYSICAL EXAM
[No Acute Distress] : no acute distress [Well Nourished] : well nourished [PERRL] : pupils equal round and reactive to light [Normal Oropharynx] : the oropharynx was normal [Supple] : supple [Clear to Auscultation] : lungs were clear to auscultation bilaterally [Regular Rhythm] : with a regular rhythm [No Murmur] : no murmur heard [No Edema] : there was no peripheral edema [Soft] : abdomen soft [Non Tender] : non-tender [No HSM] : no HSM [Normal Posterior Cervical Nodes] : no posterior cervical lymphadenopathy [Normal Anterior Cervical Nodes] : no anterior cervical lymphadenopathy [No Joint Swelling] : no joint swelling [No Rash] : no rash [Normal Gait] : normal gait [Normal Insight/Judgement] : insight and judgment were intact [de-identified] : RIGHT  SHOULDER  FROM  NO  POINT  TENDERNESS  [de-identified] : ECCHYMOSIS  CHIN

## 2018-10-17 NOTE — HISTORY OF PRESENT ILLNESS
[FreeTextEntry1] : pt  c/o  arthritis pain  [de-identified] : has  pacer  checked  was in [place] TO  PRESENT HER  BOOK FOR POSSIBLE MAKING  MOVIE TAKING  BP  MEDS DOES NOT  CHECK NO  CP OR PALP  OR  DIZZINESS STOPPED  ASPIRIN   C/O  PAIN IN  WRIST AND  SHOULDERS ALSO HAS  BACK PAIN SEES CHIRO

## 2018-10-18 ENCOUNTER — APPOINTMENT (OUTPATIENT)
Dept: CARDIOLOGY | Facility: CLINIC | Age: 83
End: 2018-10-18
Payer: MEDICARE

## 2018-10-18 ENCOUNTER — NON-APPOINTMENT (OUTPATIENT)
Age: 83
End: 2018-10-18

## 2018-10-18 VITALS
WEIGHT: 155 LBS | BODY MASS INDEX: 27.12 KG/M2 | HEART RATE: 74 BPM | SYSTOLIC BLOOD PRESSURE: 169 MMHG | HEIGHT: 63.5 IN | DIASTOLIC BLOOD PRESSURE: 82 MMHG | OXYGEN SATURATION: 100 %

## 2018-10-18 PROCEDURE — 93000 ELECTROCARDIOGRAM COMPLETE: CPT

## 2018-10-18 PROCEDURE — 99214 OFFICE O/P EST MOD 30 MIN: CPT | Mod: 25

## 2018-10-18 RX ORDER — ASPIRIN/ACETAMINOPHEN/CAFFEINE 500-325-65
325 POWDER IN PACKET (EA) ORAL
Qty: 90 | Refills: 3 | Status: DISCONTINUED | COMMUNITY
Start: 2018-06-18 | End: 2018-10-18

## 2018-11-29 ENCOUNTER — RX RENEWAL (OUTPATIENT)
Age: 83
End: 2018-11-29

## 2018-12-03 ENCOUNTER — APPOINTMENT (OUTPATIENT)
Dept: ELECTROPHYSIOLOGY | Facility: CLINIC | Age: 83
End: 2018-12-03
Payer: MEDICARE

## 2018-12-03 PROCEDURE — 93296 REM INTERROG EVL PM/IDS: CPT

## 2018-12-03 PROCEDURE — 93294 REM INTERROG EVL PM/LDLS PM: CPT

## 2018-12-04 ENCOUNTER — APPOINTMENT (OUTPATIENT)
Dept: ELECTROPHYSIOLOGY | Facility: CLINIC | Age: 83
End: 2018-12-04
Payer: MEDICARE

## 2018-12-04 VITALS
HEART RATE: 71 BPM | BODY MASS INDEX: 25.37 KG/M2 | DIASTOLIC BLOOD PRESSURE: 84 MMHG | HEIGHT: 63.5 IN | SYSTOLIC BLOOD PRESSURE: 173 MMHG | WEIGHT: 145 LBS

## 2018-12-04 PROCEDURE — 93280 PM DEVICE PROGR EVAL DUAL: CPT

## 2018-12-05 ENCOUNTER — APPOINTMENT (OUTPATIENT)
Dept: FAMILY MEDICINE | Facility: CLINIC | Age: 83
End: 2018-12-05
Payer: MEDICARE

## 2018-12-05 VITALS
SYSTOLIC BLOOD PRESSURE: 140 MMHG | DIASTOLIC BLOOD PRESSURE: 64 MMHG | OXYGEN SATURATION: 95 % | BODY MASS INDEX: 26.67 KG/M2 | HEIGHT: 63.5 IN | HEART RATE: 75 BPM | WEIGHT: 152.38 LBS

## 2018-12-05 VITALS — RESPIRATION RATE: 16 BRPM | SYSTOLIC BLOOD PRESSURE: 140 MMHG | DIASTOLIC BLOOD PRESSURE: 80 MMHG | HEART RATE: 76 BPM

## 2018-12-05 PROCEDURE — 99214 OFFICE O/P EST MOD 30 MIN: CPT | Mod: 25

## 2018-12-05 PROCEDURE — 20610 DRAIN/INJ JOINT/BURSA W/O US: CPT

## 2018-12-05 RX ORDER — METHYLPRED ACET/NACL,ISO-OS/PF 40 MG/ML
40 VIAL (ML) INJECTION
Qty: 1 | Refills: 0 | Status: COMPLETED | OUTPATIENT
Start: 2018-12-05

## 2018-12-05 RX ADMIN — METHYLPREDNISOLONE ACETATE 40 MG/ML: 40 INJECTION, SUSPENSION INTRA-ARTICULAR; INTRALESIONAL; INTRAMUSCULAR; SOFT TISSUE at 00:00

## 2018-12-05 NOTE — HISTORY OF PRESENT ILLNESS
[FreeTextEntry1] : left  shoulder pain left  leg  weakness  bilateral  wrist pain  [de-identified] : took tumaric helped  wrist pain takes tylenol  helps somewhat pain is  severe  keep  pt up at night had  similar episode in past and got steroid injection  \par \par bp varies no cp sob no dizziness

## 2018-12-05 NOTE — PHYSICAL EXAM
[No Acute Distress] : no acute distress [Well Nourished] : well nourished [PERRL] : pupils equal round and reactive to light [Normal Oropharynx] : the oropharynx was normal [Supple] : supple [Clear to Auscultation] : lungs were clear to auscultation bilaterally [Regular Rhythm] : with a regular rhythm [No Murmur] : no murmur heard [No Edema] : there was no peripheral edema [Soft] : abdomen soft [Non Tender] : non-tender [No HSM] : no HSM [Normal Posterior Cervical Nodes] : no posterior cervical lymphadenopathy [Normal Anterior Cervical Nodes] : no anterior cervical lymphadenopathy [Kyphosis] : kyphosis [No Joint Swelling] : no joint swelling [No Rash] : no rash [Normal Gait] : normal gait [Normal Insight/Judgement] : insight and judgment were intact [de-identified] : left  shoulder  from no paint  tenderness djd of  hands gait is  waddling with cane  [de-identified] : ECCHYMOSIS  CHIN

## 2018-12-05 NOTE — ASSESSMENT
[FreeTextEntry1] : djd of  shoulder injected  with lidocaine and medrol  djd  hands and djd  hips  may have  some  spinal stenosis  causing  weakness in left leg will medicate with tramadol

## 2018-12-14 ENCOUNTER — APPOINTMENT (OUTPATIENT)
Dept: ELECTROPHYSIOLOGY | Facility: CLINIC | Age: 83
End: 2018-12-14

## 2018-12-21 NOTE — DISCHARGE NOTE ADULT - NS AS ACTIVITY OBS
PAT Pre-Op History & Physical 
 
Patient: Mellisa Dobbins                  MRN: [id number]          SSN: [id number] YOB: 1949          Age: 71 y.o. Sex: female Addendum: MRSA and EKG WNL Subjective:  
 
Patient is a 71 y.o.  female who presents with history of reaching for a object with her right arm in July, which she states she did farily quickly. She remembers she felt instant pain in her shoulder after. The pain has gotten progressively worse and now she has limited ROM to her right arm. Pain ranges from 4/10-10/10 with the most pain coming at night while trying to sleep. She has to get up in the middle of the night to take a percocet. She notes her right arm is weaker and she has difficulty picking up a coffee cup. She is left hand dominant. She has failed narcotics, injections, PT, ice application. The patient was evaluated in the surgeon's office and it was determined that the most appropriate plan of care is to proceed with surgical intervention. Patient's PCP Mindi Ventura MD 
 
Patient states she went to her pulmonologist for clearance. Past Medical History:  
Diagnosis Date  Arthritis  Claustrophobia  COPD  Degenerative disc disease, lumbar 1/6/2016  Diabetes ([address] 2012 She states the metformin is prevention only  Glaucoma  Hypertension  Obesity (BMI 30-39. 9)  Peripheral vascular disease ([address] stents in each iliac artery, states they are \"watching\" carotids  Unspecified adverse effect of anesthesia \"BLOOD PRESSURE DROPS\"  Unspecified sleep apnea Does not uses CPAP Past Surgical History:  
Procedure Laterality Date  HX BACK SURGERY  2006 LUMBAR- PLATES, SCREWS  
 HX HYSTERECTOMY  1980  HX LUMBAR LAMINECTOMY N/A 2016 & 2017 ALIF and then did the posterior after it didnt work  HX ORTHOPAEDIC Right ULNAR NERVE SURGERY  
 HX ORTHOPAEDIC Left 2013 rotator cuff shoulder  VASCULAR SURGERY PROCEDURE UNLIST Bilateral 2010 ILIAC STENT Prior to Admission medications Medication Sig Start Date End Date Taking? Authorizing Provider  
umeclidinium-vilanterol (ANORO ELLIPTA) 62.5-25 mcg/actuation inhaler Take 1 Puff by inhalation every morning. Yes Provider, Historical  
aspirin (ASPIRIN) 325 mg tablet Take 325 mg by mouth every morning. Yes Provider, Historical  
glucosamine sulfate (GLUCOSAMINE) 500 mg tablet Take 1,000 mg by mouth daily. Yes Provider, Historical  
magnesium 200 mg tab Take 1 Tab by mouth every morning. Yes Provider, Historical  
vitamin E (AQUA GEMS) 400 unit capsule Take 400 Units by mouth every morning. Yes Provider, Historical  
Omega-3 Fatty Acids 60- mg cpDR Take 2 Tabs by mouth every morning. Yes Provider, Historical  
multivit with minerals/lutein (MULTIVITAMIN 50 PLUS PO) Take 1 Tab by mouth every morning. Yes Provider, Historical  
oxyCODONE-acetaminophen (PERCOCET)  mg per tablet Take 1 Tab by mouth every six (6) hours as needed for Pain. Yes Provider, Historical  
ascorbic acid, vitamin C, (VITAMIN C) 500 mg tablet Take 500 mg by mouth daily. Yes Provider, Historical  
CALCIUM PO Take 500 mg by mouth two (2) times a day. Yes Provider, Historical  
cholecalciferol (VITAMIN D3) 1,000 unit tablet Take 1,000 Units by mouth every morning. Yes Provider, Historical  
docusate sodium (DOK) 250 mg capsule Take 250 mg by mouth two (2) times a day. Yes Provider, Historical  
metFORMIN (GLUCOPHAGE) 500 mg tablet Take 500 mg by mouth two (2) times daily (with meals). Yes Provider, Historical  
montelukast (SINGULAIR) 10 mg tablet Take 10 mg by mouth every evening. Yes Provider, Historical  
lisinopril-hydrochlorothiazide (PRINZIDE, ZESTORETIC) 10-12.5 mg per tablet Take 1 Tab by mouth every morning.    Yes Provider, Historical  
 pravastatin (PRAVACHOL) 80 mg tablet Take 80 mg by mouth nightly. Yes Provider, Historical  
latanoprost (XALATAN) 0.005 % ophthalmic solution Administer 1 Drop to both eyes nightly. Yes Provider, Historical  
 
Current Outpatient Medications Medication Sig  
 umeclidinium-vilanterol (ANORO ELLIPTA) 62.5-25 mcg/actuation inhaler Take 1 Puff by inhalation every morning.  aspirin (ASPIRIN) 325 mg tablet Take 325 mg by mouth every morning.  glucosamine sulfate (GLUCOSAMINE) 500 mg tablet Take 1,000 mg by mouth daily.  magnesium 200 mg tab Take 1 Tab by mouth every morning.  vitamin E (AQUA GEMS) 400 unit capsule Take 400 Units by mouth every morning.  Omega-3 Fatty Acids 60- mg cpDR Take 2 Tabs by mouth every morning.  multivit with minerals/lutein (MULTIVITAMIN 50 PLUS PO) Take 1 Tab by mouth every morning.  oxyCODONE-acetaminophen (PERCOCET)  mg per tablet Take 1 Tab by mouth every six (6) hours as needed for Pain.  ascorbic acid, vitamin C, (VITAMIN C) 500 mg tablet Take 500 mg by mouth daily.  CALCIUM PO Take 500 mg by mouth two (2) times a day.  cholecalciferol (VITAMIN D3) 1,000 unit tablet Take 1,000 Units by mouth every morning.  docusate sodium (DOK) 250 mg capsule Take 250 mg by mouth two (2) times a day.  metFORMIN (GLUCOPHAGE) 500 mg tablet Take 500 mg by mouth two (2) times daily (with meals).  montelukast (SINGULAIR) 10 mg tablet Take 10 mg by mouth every evening.  lisinopril-hydrochlorothiazide (PRINZIDE, ZESTORETIC) 10-12.5 mg per tablet Take 1 Tab by mouth every morning.  pravastatin (PRAVACHOL) 80 mg tablet Take 80 mg by mouth nightly.  latanoprost (XALATAN) 0.005 % ophthalmic solution Administer 1 Drop to both eyes nightly. No current facility-administered medications for this encounter. Allergies Allergen Reactions  Diclofenac Anaphylaxis   SWELLING, DIFFICULTY BREATHING  
  Flexeril [Cyclobenzaprine] Swelling Hands, face and throat swelling  Medrol [Methylprednisolone] Unknown (comments) She has forgotten  Naproxen Swelling  Tolectin [Tolmetin] Swelling Took this with Flexeril and does not know which med caused swelling of hands,face and throat Social History Tobacco Use  Smoking status: Former Smoker Packs/day: 2.00 Years: 35.00 Pack years: 70.00 Types: Cigarettes Last attempt to quit: 10/2/2004 Years since quittin.2  Smokeless tobacco: Never Used Substance Use Topics  Alcohol use: Yes Alcohol/week: 2.4 oz Types: 2 Glasses of wine, 2 Cans of beer per week Social History Substance and Sexual Activity Drug Use No  
 
Family History Problem Relation Age of Onset  Heart Disease Father  Heart Attack Father 36  
 No Known Problems Sister  Deep Vein Thrombosis Brother  Heart Disease Brother Review of Systems Patient denies difficulty swallowing, mouth sores, or loose teeth. Patient denies any recent dental procedures or any planned prior to surgery. Patient denies chest pain, tightness, pain radiating down left arm, palpitations. Denies dizziness, visual disturbances, or lightheadedness. Patient denies shortness of breath, wheezing, cough, fever, or chills. Patient denies diarrhea, constipation, or abdominal pain. Patient denies urinary problems including dysuria, hesitancy, urgency, or incontinence. Denies skin breakdown, rashes, insect bites or open area. Objective:  
 
Patient Vitals for the past 24 hrs: 
 Temp Pulse Resp BP SpO2  
18 0842 97.9 °F (36.6 °C) 86 16 165/69 95 % Temp (24hrs), Av.9 °F (36.6 °C), Min:97.9 °F (36.6 °C), Max:97.9 °F (36.6 °C) Body mass index is 31.21 kg/m². Wt Readings from Last 1 Encounters:  
18 79.9 kg (176 lb 3.2 oz) Physical Exam: 
 
 General: Pleasant,  cooperative, no apparent distress, appears stated age. 
 Eyes: Conjunctivae/corneas clear. EOMs intact. Nose: Nares normal. 
 Mouth/Throat: Lips, mucosa, and tongue normal. Teeth and gums normal. 
 Lungs: Clear to auscultation bilaterally. Heart: Regular rate and rhythm, S1, S2 normal. No murmur, click, rub or gallop. Abdomen: Soft, non-tender. Bowel sounds normal. No distention. Musculoskeletal:  Limited ROM to right shoulder. Extremities:  Extremities normal, atraumatic, no cyanosis or edema. Calves 
                               supple, non tender to palpation. Pulses: 2+ and symmetric bilateral upper extremities. Cap. refill <2 seconds Skin: Skin color, texture, turgor normal. No visible open areas, examined fully clothed Neurologic: CN II-XII grossly intact. Alert and oriented x3. Labs:  
Recent Results (from the past 72 hour(s)) CBC WITH AUTOMATED DIFF Collection Time: 12/21/18  9:21 AM  
Result Value Ref Range WBC 4.9 3.6 - 11.0 K/uL  
 RBC 3.85 3.80 - 5.20 M/uL  
 HGB 12.4 11.5 - 16.0 g/dL HCT 36.6 35.0 - 47.0 % MCV 95.1 80.0 - 99.0 FL  
 MCH 32.2 26.0 - 34.0 PG  
 MCHC 33.9 30.0 - 36.5 g/dL  
 RDW 13.3 11.5 - 14.5 % PLATELET 109 806 - 832 K/uL MPV 10.5 8.9 - 12.9 FL  
 NRBC 0.0 0  WBC ABSOLUTE NRBC 0.00 0.00 - 0.01 K/uL NEUTROPHILS 49 32 - 75 % LYMPHOCYTES 31 12 - 49 % MONOCYTES 16 (H) 5 - 13 % EOSINOPHILS 3 0 - 7 % BASOPHILS 1 0 - 1 % IMMATURE GRANULOCYTES 0 0.0 - 0.5 % ABS. NEUTROPHILS 2.4 1.8 - 8.0 K/UL  
 ABS. LYMPHOCYTES 1.5 0.8 - 3.5 K/UL  
 ABS. MONOCYTES 0.8 0.0 - 1.0 K/UL  
 ABS. EOSINOPHILS 0.2 0.0 - 0.4 K/UL  
 ABS. BASOPHILS 0.0 0.0 - 0.1 K/UL  
 ABS. IMM. GRANS. 0.0 0.00 - 0.04 K/UL  
 DF AUTOMATED METABOLIC PANEL, COMPREHENSIVE Collection Time: 12/21/18  9:21 AM  
Result Value Ref Range Sodium 136 136 - 145 mmol/L Potassium 5.3 (H) 3.5 - 5.1 mmol/L Chloride 98 97 - 108 mmol/L  
 CO2 28 21 - 32 mmol/L  Anion gap 10 5 - 15 mmol/L  
 Glucose 98 65 - 100 mg/dL BUN 21 (H) 6 - 20 MG/DL Creatinine 0.85 0.55 - 1.02 MG/DL  
 BUN/Creatinine ratio 25 (H) 12 - 20 GFR est AA >60 >60 ml/min/1.73m2 GFR est non-AA >60 >60 ml/min/1.73m2 Calcium 9.6 8.5 - 10.1 MG/DL Bilirubin, total 0.3 0.2 - 1.0 MG/DL  
 ALT (SGPT) 28 12 - 78 U/L  
 AST (SGOT) 17 15 - 37 U/L Alk. phosphatase 82 45 - 117 U/L Protein, total 7.2 6.4 - 8.2 g/dL Albumin 4.1 3.5 - 5.0 g/dL Globulin 3.1 2.0 - 4.0 g/dL A-G Ratio 1.3 1.1 - 2.2 HEMOGLOBIN A1C WITH EAG Collection Time: 12/21/18  9:21 AM  
Result Value Ref Range Hemoglobin A1c 5.9 4.2 - 6.3 % Est. average glucose 123 mg/dL PROTHROMBIN TIME + INR Collection Time: 12/21/18  9:21 AM  
Result Value Ref Range INR 1.0 0.9 - 1.1 Prothrombin time 9.9 9.0 - 11.1 sec PTT Collection Time: 12/21/18  9:21 AM  
Result Value Ref Range aPTT 29.9 22.1 - 32.0 sec  
 aPTT, therapeutic range     58.0 - 77.0 SECS  
URINALYSIS W/ REFLEX CULTURE Collection Time: 12/21/18  9:21 AM  
Result Value Ref Range Color YELLOW/STRAW Appearance CLEAR CLEAR Specific gravity 1.007 1.003 - 1.030    
 pH (UA) 7.5 5.0 - 8.0 Protein NEGATIVE  NEG mg/dL Glucose NEGATIVE  NEG mg/dL Ketone NEGATIVE  NEG mg/dL Bilirubin NEGATIVE  NEG Blood NEGATIVE  NEG Urobilinogen 0.2 0.2 - 1.0 EU/dL Nitrites NEGATIVE  NEG Leukocyte Esterase TRACE (A) NEG    
 WBC 0-4 0 - 4 /hpf  
 RBC 0-5 0 - 5 /hpf Epithelial cells FEW FEW /lpf Bacteria NEGATIVE  NEG /hpf  
 UA:UC IF INDICATED CULTURE NOT INDICATED BY UA RESULT CNI Hyaline cast 0-2 0 - 5 /lpf  
TYPE & SCREEN Collection Time: 12/21/18  9:21 AM  
Result Value Ref Range Crossmatch Expiration 01/04/2019 ABO/Rh(D) A POSITIVE Antibody screen NEG   
EKG, 12 LEAD, INITIAL Collection Time: 12/21/18  9:38 AM  
Result Value Ref Range Ventricular Rate 80 BPM  
 Atrial Rate 80 BPM  
 P-R Interval 190 ms QRS Duration 72 ms Q-T Interval 362 ms QTC Calculation (Bezet) 417 ms Calculated P Axis 66 degrees Calculated R Axis 49 degrees Calculated T Axis 62 degrees Diagnosis Normal sinus rhythm Normal ECG When compared with ECG of 31-MAY-2016 11:32, No significant change was found Assessment:  
 
OA Right Shoulder Plan:  
 
Scheduled for Right Reverse Total Shoulder Labs reviewed. Potassium a tic high at 5.3. All other labs WNL. EKG pending along with MRSA. ASA plan sent by nurse to vascular physician Pulmonologist clearance reviewed and placed in paper chart.   
 
 
James Pleitez NP 
 
 Stairs allowed/Return to Work/School allowed/No Heavy lifting/straining/Do not make important decisions/Walking-Outdoors allowed/Showering allowed/Walking-Indoors allowed/Sex allowed/Do not drive or operate machinery

## 2019-01-09 ENCOUNTER — APPOINTMENT (OUTPATIENT)
Dept: FAMILY MEDICINE | Facility: CLINIC | Age: 84
End: 2019-01-09
Payer: MEDICARE

## 2019-01-09 VITALS
BODY MASS INDEX: 26.6 KG/M2 | HEIGHT: 63.5 IN | HEART RATE: 101 BPM | TEMPERATURE: 98.9 F | DIASTOLIC BLOOD PRESSURE: 70 MMHG | OXYGEN SATURATION: 95 % | SYSTOLIC BLOOD PRESSURE: 142 MMHG | WEIGHT: 152 LBS | RESPIRATION RATE: 16 BRPM

## 2019-01-09 VITALS — SYSTOLIC BLOOD PRESSURE: 122 MMHG | DIASTOLIC BLOOD PRESSURE: 80 MMHG | RESPIRATION RATE: 16 BRPM | HEART RATE: 90 BPM

## 2019-01-09 DIAGNOSIS — M19.012 PRIMARY OSTEOARTHRITIS, LEFT SHOULDER: ICD-10-CM

## 2019-01-09 PROCEDURE — 99214 OFFICE O/P EST MOD 30 MIN: CPT

## 2019-01-09 RX ORDER — LEVOTHYROXINE SODIUM 112 UG/1
112 TABLET ORAL
Refills: 0 | Status: COMPLETED | COMMUNITY

## 2019-01-09 NOTE — HISTORY OF PRESENT ILLNESS
[FreeTextEntry1] : for management of HTN GERD  AND  DJD  [de-identified] : SHOULDER  MUCH  BETTER  AFTER STEROID  INJECTION  NOT TAKING  ANY PAIN MEDS

## 2019-01-09 NOTE — PHYSICAL EXAM
[No Acute Distress] : no acute distress [Well Nourished] : well nourished [PERRL] : pupils equal round and reactive to light [Normal Oropharynx] : the oropharynx was normal [Supple] : supple [Clear to Auscultation] : lungs were clear to auscultation bilaterally [Regular Rhythm] : with a regular rhythm [No Murmur] : no murmur heard [No Edema] : there was no peripheral edema [Soft] : abdomen soft [Non Tender] : non-tender [No HSM] : no HSM [Normal Posterior Cervical Nodes] : no posterior cervical lymphadenopathy [Normal Anterior Cervical Nodes] : no anterior cervical lymphadenopathy [Kyphosis] : kyphosis [No Joint Swelling] : no joint swelling [No Rash] : no rash [Normal Gait] : normal gait [Normal Insight/Judgement] : insight and judgment were intact [de-identified] : ECCHYMOSIS  CHIN

## 2019-02-13 ENCOUNTER — NON-APPOINTMENT (OUTPATIENT)
Age: 84
End: 2019-02-13

## 2019-02-13 ENCOUNTER — APPOINTMENT (OUTPATIENT)
Dept: CARDIOLOGY | Facility: CLINIC | Age: 84
End: 2019-02-13
Payer: MEDICARE

## 2019-02-13 VITALS
HEART RATE: 73 BPM | BODY MASS INDEX: 26.6 KG/M2 | OXYGEN SATURATION: 97 % | HEIGHT: 63.5 IN | WEIGHT: 152 LBS | SYSTOLIC BLOOD PRESSURE: 156 MMHG | DIASTOLIC BLOOD PRESSURE: 76 MMHG

## 2019-02-13 DIAGNOSIS — I34.0 NONRHEUMATIC MITRAL (VALVE) INSUFFICIENCY: ICD-10-CM

## 2019-02-13 PROCEDURE — 99214 OFFICE O/P EST MOD 30 MIN: CPT | Mod: 25

## 2019-02-13 PROCEDURE — 93306 TTE W/DOPPLER COMPLETE: CPT

## 2019-02-13 PROCEDURE — 93000 ELECTROCARDIOGRAM COMPLETE: CPT

## 2019-02-13 NOTE — DISCUSSION/SUMMARY
[FreeTextEntry1] : Pt is a 86 y/o F who presents today for f/u, she is s/p TAVR Dr Wray with PPM placement Dr Koo 2/9/2018 at [facility]\par She is doing very well and has no complaints.  She has been following up with EP \par She has no s/s HF and remains very active\par HTN: BP elevated, will monitor - c/w current meds\par Her cholesterol is high but she refuses any treatment\par monitor thyroid function and adjust medication as needed\par f/u 6 mnths or sooner PRN \par The described plan was discussed with the pt.  All questions and concerns were addressed to the best of my knowledge.\par

## 2019-02-13 NOTE — HISTORY OF PRESENT ILLNESS
[FreeTextEntry1] : Pt is an 88 y/o F who presents today for f/u.  She is s/p TAVR with Dr Christy 2/9/2018 at [facility].  Post-op heart block with subsequent PPM placement Riki Sci with Dr Koo - now following with Dr Cole.  She states that she has been feeling well and has no complaints. She denies CP, SOB, diaphoresis, palpitations, dizziness, syncope, LE edema, PND.\par She notes that her biggest discomfort right now is arthritis of her hands and shoulder.\par She is an ex-smoker and has PMH hypothyroidism, HTN, HLD\par She remains active - housework, aerobics every morning 15 min, plays bridge 2x/week\par

## 2019-02-13 NOTE — REASON FOR VISIT
[Follow-Up - Clinic] : a clinic follow-up of [Hyperlipidemia] : hyperlipidemia [Hypertension] : hypertension [FreeTextEntry1] : TAVR and PPM

## 2019-02-13 NOTE — PHYSICAL EXAM
[General Appearance - Well Developed] : well developed [Normal Appearance] : normal appearance [Well Groomed] : well groomed [General Appearance - Well Nourished] : well nourished [No Deformities] : no deformities [General Appearance - In No Acute Distress] : no acute distress [Normal Conjunctiva] : the conjunctiva exhibited no abnormalities [Eyelids - No Xanthelasma] : the eyelids demonstrated no xanthelasmas [Normal Oral Mucosa] : normal oral mucosa [No Oral Pallor] : no oral pallor [No Oral Cyanosis] : no oral cyanosis [Respiration, Rhythm And Depth] : normal respiratory rhythm and effort [Exaggerated Use Of Accessory Muscles For Inspiration] : no accessory muscle use [Auscultation Breath Sounds / Voice Sounds] : lungs were clear to auscultation bilaterally [Heart Rate And Rhythm] : heart rate and rhythm were normal [Heart Sounds] : normal S1 and S2 [Arterial Pulses Normal] : the arterial pulses were normal [Edema] : no peripheral edema present [Systolic grade ___/6] : A grade [unfilled]/6 systolic murmur was heard. [Abdomen Soft] : soft [Abdomen Tenderness] : non-tender [] : no hepato-splenomegaly [Abdomen Mass (___ Cm)] : no abdominal mass palpated [Abnormal Walk] : normal gait [Gait - Sufficient For Exercise Testing] : the gait was sufficient for exercise testing [Oriented To Time, Place, And Person] : oriented to person, place, and time [Impaired Insight] : insight and judgment were intact [Affect] : the affect was normal [Mood] : the mood was normal [No Anxiety] : not feeling anxious [FreeTextEntry1] : no JVD or bruits

## 2019-03-04 ENCOUNTER — APPOINTMENT (OUTPATIENT)
Dept: ELECTROPHYSIOLOGY | Facility: CLINIC | Age: 84
End: 2019-03-04

## 2019-03-04 ENCOUNTER — APPOINTMENT (OUTPATIENT)
Dept: ELECTROPHYSIOLOGY | Facility: CLINIC | Age: 84
End: 2019-03-04
Payer: MEDICARE

## 2019-03-04 PROCEDURE — 93294 REM INTERROG EVL PM/LDLS PM: CPT

## 2019-03-04 PROCEDURE — 93296 REM INTERROG EVL PM/IDS: CPT

## 2019-03-21 ENCOUNTER — RX RENEWAL (OUTPATIENT)
Age: 84
End: 2019-03-21

## 2019-04-01 ENCOUNTER — RX RENEWAL (OUTPATIENT)
Age: 84
End: 2019-04-01

## 2019-04-07 ENCOUNTER — RX RENEWAL (OUTPATIENT)
Age: 84
End: 2019-04-07

## 2019-04-10 ENCOUNTER — RX RENEWAL (OUTPATIENT)
Age: 84
End: 2019-04-10

## 2019-05-15 ENCOUNTER — APPOINTMENT (OUTPATIENT)
Dept: FAMILY MEDICINE | Facility: CLINIC | Age: 84
End: 2019-05-15
Payer: MEDICARE

## 2019-05-15 VITALS — HEART RATE: 72 BPM | RESPIRATION RATE: 16 BRPM | SYSTOLIC BLOOD PRESSURE: 160 MMHG | DIASTOLIC BLOOD PRESSURE: 80 MMHG

## 2019-05-15 VITALS
HEART RATE: 68 BPM | WEIGHT: 153 LBS | BODY MASS INDEX: 26.77 KG/M2 | OXYGEN SATURATION: 98 % | HEIGHT: 63.5 IN | DIASTOLIC BLOOD PRESSURE: 80 MMHG | RESPIRATION RATE: 16 BRPM | SYSTOLIC BLOOD PRESSURE: 140 MMHG

## 2019-05-15 DIAGNOSIS — M19.90 UNSPECIFIED OSTEOARTHRITIS, UNSPECIFIED SITE: ICD-10-CM

## 2019-05-15 DIAGNOSIS — M06.9 RHEUMATOID ARTHRITIS, UNSPECIFIED: ICD-10-CM

## 2019-05-15 PROCEDURE — 99214 OFFICE O/P EST MOD 30 MIN: CPT

## 2019-05-15 RX ORDER — TRAMADOL HYDROCHLORIDE 50 MG/1
50 TABLET, COATED ORAL
Qty: 50 | Refills: 0 | Status: COMPLETED | COMMUNITY
Start: 2018-12-05 | End: 2019-05-15

## 2019-05-15 RX ORDER — FOLIC ACID 1 MG/1
1 TABLET ORAL
Refills: 0 | Status: ACTIVE | COMMUNITY
Start: 2019-05-15

## 2019-05-15 RX ORDER — METHOTREXATE 2.5 MG/1
2.5 TABLET ORAL
Refills: 0 | Status: ACTIVE | COMMUNITY
Start: 2019-05-15

## 2019-05-15 NOTE — REVIEW OF SYSTEMS
[Fatigue] : no fatigue [Chest Pain] : no chest pain [Palpitations] : no palpitations [Shortness Of Breath] : no shortness of breath [Constipation] : no constipation [Diarrhea] : diarrhea [Dizziness] : no dizziness [FreeTextEntry9] : joint pain better

## 2019-05-15 NOTE — PHYSICAL EXAM
[No Acute Distress] : no acute distress [PERRL] : pupils equal round and reactive to light [Well Nourished] : well nourished [Supple] : supple [Normal Oropharynx] : the oropharynx was normal [Regular Rhythm] : with a regular rhythm [Clear to Auscultation] : lungs were clear to auscultation bilaterally [No Edema] : there was no peripheral edema [No Murmur] : no murmur heard [Soft] : abdomen soft [Non Tender] : non-tender [No HSM] : no HSM [Normal Posterior Cervical Nodes] : no posterior cervical lymphadenopathy [Kyphosis] : kyphosis [Normal Anterior Cervical Nodes] : no anterior cervical lymphadenopathy [No Joint Swelling] : no joint swelling [No Rash] : no rash [Normal Gait] : normal gait [Normal Insight/Judgement] : insight and judgment were intact [de-identified] : waddling  gate  [de-identified] : ECCHYMOSIS  CHIN

## 2019-05-15 NOTE — HISTORY OF PRESENT ILLNESS
[Does not check BP] : The patient is not checking blood pressure [<140/90] : Target blood pressure is <140/90 [Target goal met] : BP target goal met [Patient declined therapy] : Patient declined statin therapy [Systolic] : systolic [With moderate physical activity] : Shortness of breath with moderate physical activity [Stable] : The condition is stable [FreeTextEntry6] : has  had  joint pain in hands saw rheum started on prednisone and then switched to methotrexate  weekly  bp  was up  at rheum

## 2019-05-16 LAB
ALBUMIN SERPL ELPH-MCNC: 4.3 G/DL
ALP BLD-CCNC: 100 U/L
ALT SERPL-CCNC: 15 U/L
ANION GAP SERPL CALC-SCNC: 12 MMOL/L
APPEARANCE: ABNORMAL
AST SERPL-CCNC: 21 U/L
BACTERIA: ABNORMAL
BASOPHILS # BLD AUTO: 0.04 K/UL
BASOPHILS NFR BLD AUTO: 0.5 %
BILIRUB SERPL-MCNC: 0.2 MG/DL
BILIRUBIN URINE: NEGATIVE
BLOOD URINE: ABNORMAL
BUN SERPL-MCNC: 14 MG/DL
CALCIUM SERPL-MCNC: 9.7 MG/DL
CHLORIDE SERPL-SCNC: 103 MMOL/L
CHOLEST SERPL-MCNC: 233 MG/DL
CHOLEST/HDLC SERPL: 5.1 RATIO
CO2 SERPL-SCNC: 26 MMOL/L
COLOR: YELLOW
CREAT SERPL-MCNC: 0.77 MG/DL
EOSINOPHIL # BLD AUTO: 0.29 K/UL
EOSINOPHIL NFR BLD AUTO: 3.9 %
ERYTHROCYTE [SEDIMENTATION RATE] IN BLOOD BY WESTERGREN METHOD: 33 MM/HR
GLUCOSE QUALITATIVE U: NEGATIVE
GLUCOSE SERPL-MCNC: 83 MG/DL
HCT VFR BLD CALC: 38.1 %
HDLC SERPL-MCNC: 46 MG/DL
HGB BLD-MCNC: 12.3 G/DL
HYALINE CASTS: 2 /LPF
IMM GRANULOCYTES NFR BLD AUTO: 0.7 %
KETONES URINE: NEGATIVE
LDLC SERPL CALC-MCNC: 157 MG/DL
LEUKOCYTE ESTERASE URINE: ABNORMAL
LYMPHOCYTES # BLD AUTO: 1.85 K/UL
LYMPHOCYTES NFR BLD AUTO: 24.7 %
MAN DIFF?: NORMAL
MCHC RBC-ENTMCNC: 29.5 PG
MCHC RBC-ENTMCNC: 32.3 GM/DL
MCV RBC AUTO: 91.4 FL
MICROSCOPIC-UA: NORMAL
MONOCYTES # BLD AUTO: 0.64 K/UL
MONOCYTES NFR BLD AUTO: 8.5 %
NEUTROPHILS # BLD AUTO: 4.63 K/UL
NEUTROPHILS NFR BLD AUTO: 61.7 %
NITRITE URINE: NEGATIVE
PH URINE: 6.5
PLATELET # BLD AUTO: 205 K/UL
POTASSIUM SERPL-SCNC: 4.8 MMOL/L
PROT SERPL-MCNC: 6.8 G/DL
PROTEIN URINE: ABNORMAL
RBC # BLD: 4.17 M/UL
RBC # FLD: 15.3 %
RED BLOOD CELLS URINE: 10 /HPF
SODIUM SERPL-SCNC: 141 MMOL/L
SPECIFIC GRAVITY URINE: 1.01
SQUAMOUS EPITHELIAL CELLS: >27 /HPF
T4 SERPL-MCNC: 9 UG/DL
TRIGL SERPL-MCNC: 151 MG/DL
TSH SERPL-ACNC: 0.39 UIU/ML
URATE SERPL-MCNC: 4.8 MG/DL
UROBILINOGEN URINE: NORMAL
WBC # FLD AUTO: 7.5 K/UL
WHITE BLOOD CELLS URINE: 50 /HPF

## 2019-05-28 ENCOUNTER — RX RENEWAL (OUTPATIENT)
Age: 84
End: 2019-05-28

## 2019-06-10 ENCOUNTER — APPOINTMENT (OUTPATIENT)
Dept: ELECTROPHYSIOLOGY | Facility: CLINIC | Age: 84
End: 2019-06-10

## 2019-08-13 ENCOUNTER — APPOINTMENT (OUTPATIENT)
Dept: CARDIOLOGY | Facility: CLINIC | Age: 84
End: 2019-08-13
Payer: MEDICARE

## 2019-08-13 ENCOUNTER — APPOINTMENT (OUTPATIENT)
Dept: ELECTROPHYSIOLOGY | Facility: CLINIC | Age: 84
End: 2019-08-13
Payer: MEDICARE

## 2019-08-13 ENCOUNTER — NON-APPOINTMENT (OUTPATIENT)
Age: 84
End: 2019-08-13

## 2019-08-13 VITALS
HEART RATE: 59 BPM | DIASTOLIC BLOOD PRESSURE: 92 MMHG | BODY MASS INDEX: 26.77 KG/M2 | HEIGHT: 63.5 IN | SYSTOLIC BLOOD PRESSURE: 175 MMHG | WEIGHT: 153 LBS | OXYGEN SATURATION: 99 %

## 2019-08-13 VITALS
BODY MASS INDEX: 26.77 KG/M2 | HEART RATE: 59 BPM | WEIGHT: 153 LBS | DIASTOLIC BLOOD PRESSURE: 92 MMHG | SYSTOLIC BLOOD PRESSURE: 175 MMHG | HEIGHT: 63.5 IN | OXYGEN SATURATION: 97 %

## 2019-08-13 VITALS — DIASTOLIC BLOOD PRESSURE: 74 MMHG | SYSTOLIC BLOOD PRESSURE: 156 MMHG

## 2019-08-13 PROCEDURE — 93000 ELECTROCARDIOGRAM COMPLETE: CPT

## 2019-08-13 PROCEDURE — 93280 PM DEVICE PROGR EVAL DUAL: CPT

## 2019-08-13 PROCEDURE — 99214 OFFICE O/P EST MOD 30 MIN: CPT | Mod: 25

## 2019-08-13 RX ORDER — CHONDROITIN SULFATE A SODIUM 100 %
POWDER (GRAM) MISCELLANEOUS
Refills: 0 | Status: DISCONTINUED | COMMUNITY
End: 2019-08-13

## 2019-08-13 NOTE — PROCEDURE
[No] : not [NSR] : normal sinus rhythm [Pacemaker] : pacemaker [Longevity: ___ months] : The estimated remaining battery life is [unfilled] months [DDD] : DDD [Threshold Testing Performed] : Threshold testing was performed [Lead Imp:  ___ohms] : lead impedance was [unfilled] ohms [___V @] : [unfilled] V [Sensing Amplitude ___mv] : sensing amplitude was [unfilled] mv [___ ms] : [unfilled] ms [None] : none [Asense-Vpace ___ %] : Asense-Vpace [unfilled]% [de-identified] : [place] [de-identified] : [facility] [de-identified] : 50

## 2019-08-13 NOTE — DISCUSSION/SUMMARY
[Pacemaker Function Normal] : normal pacemaker function [FreeTextEntry1] : F/U with remote monitoring every 3 months and f/u in office for device check in 1 year.

## 2019-08-13 NOTE — PHYSICAL EXAM
[General Appearance - Well Developed] : well developed [Normal Appearance] : normal appearance [Well Groomed] : well groomed [General Appearance - Well Nourished] : well nourished [No Deformities] : no deformities [Normal Conjunctiva] : the conjunctiva exhibited no abnormalities [General Appearance - In No Acute Distress] : no acute distress [Eyelids - No Xanthelasma] : the eyelids demonstrated no xanthelasmas [Normal Oral Mucosa] : normal oral mucosa [No Oral Pallor] : no oral pallor [No Oral Cyanosis] : no oral cyanosis [Respiration, Rhythm And Depth] : normal respiratory rhythm and effort [Heart Rate And Rhythm] : heart rate and rhythm were normal [Exaggerated Use Of Accessory Muscles For Inspiration] : no accessory muscle use [Auscultation Breath Sounds / Voice Sounds] : lungs were clear to auscultation bilaterally [Arterial Pulses Normal] : the arterial pulses were normal [Heart Sounds] : normal S1 and S2 [Systolic grade ___/6] : A grade [unfilled]/6 systolic murmur was heard. [Edema] : no peripheral edema present [Abdomen Soft] : soft [Abdomen Tenderness] : non-tender [] : no hepato-splenomegaly [Abdomen Mass (___ Cm)] : no abdominal mass palpated [Abnormal Walk] : normal gait [Gait - Sufficient For Exercise Testing] : the gait was sufficient for exercise testing [Oriented To Time, Place, And Person] : oriented to person, place, and time [Impaired Insight] : insight and judgment were intact [Affect] : the affect was normal [Mood] : the mood was normal [No Anxiety] : not feeling anxious [FreeTextEntry1] : no JVD or bruits

## 2019-08-13 NOTE — DISCUSSION/SUMMARY
[FreeTextEntry1] : Pt is a 88 y/o F who presents today for f/u, she is s/p TAVR Dr Wray with PPM placement Dr Koo 2/9/2018 at [facility]\par TTE 02/2019 EF 70-75%, mod MR, mild-mod MS, TAVR, mod pHTN, sev TR\par She is doing very well and has no complaints.  She has been following up with EP \par She has no s/s HF and remains very active\par HTN: BP elevated, will monitor - c/w current meds\par Her cholesterol is high but she refuses any treatment\par monitor thyroid function and adjust medication as needed\par Her BP is elevated today but usually well controlled at PT - will monitor\par f/u 6 mnths or sooner PRN \par The described plan was discussed with the pt.  All questions and concerns were addressed to the best of my knowledge.\par

## 2019-08-13 NOTE — HISTORY OF PRESENT ILLNESS
[FreeTextEntry1] : Pt is an 86 y/o F who presents today for f/u.  She is s/p TAVR with Dr Christy 2/9/2018 at [facility].  Post-op heart block with subsequent PPM placement Riki Sci with Dr Koo - now following with Dr Cole.  She states that she has been feeling well and has no complaints. She denies CP, SOB, diaphoresis, palpitations, dizziness, syncope, LE edema, PND.\par She is an ex-smoker and has PMH hypothyroidism, HTN, HLD\par She remains active - housework, aerobics every morning 15 min, plays bridge 2x/week\par At PT SBP usually 130's-140's\par \par TTE 02/2019 EF 70-75%, mod MR, mild-mod MS, TAVR, mod pHTN, sev TR\par

## 2019-09-16 ENCOUNTER — APPOINTMENT (OUTPATIENT)
Dept: ELECTROPHYSIOLOGY | Facility: CLINIC | Age: 84
End: 2019-09-16

## 2019-10-23 ENCOUNTER — APPOINTMENT (OUTPATIENT)
Dept: FAMILY MEDICINE | Facility: CLINIC | Age: 84
End: 2019-10-23
Payer: MEDICARE

## 2019-10-23 VITALS
SYSTOLIC BLOOD PRESSURE: 140 MMHG | HEART RATE: 69 BPM | BODY MASS INDEX: 26.84 KG/M2 | DIASTOLIC BLOOD PRESSURE: 74 MMHG | HEIGHT: 63 IN | OXYGEN SATURATION: 98 % | WEIGHT: 151.5 LBS

## 2019-10-23 VITALS — SYSTOLIC BLOOD PRESSURE: 160 MMHG | RESPIRATION RATE: 16 BRPM | DIASTOLIC BLOOD PRESSURE: 84 MMHG | HEART RATE: 72 BPM

## 2019-10-23 PROCEDURE — 99214 OFFICE O/P EST MOD 30 MIN: CPT

## 2019-10-23 NOTE — REVIEW OF SYSTEMS
[Fatigue] : fatigue [Chest Pain] : no chest pain [Palpitations] : no palpitations [Shortness Of Breath] : no shortness of breath [Dyspnea on Exertion] : dyspnea on exertion [Constipation] : no constipation [Dizziness] : no dizziness [Diarrhea] : diarrhea [FreeTextEntry9] : joint pain better

## 2019-10-23 NOTE — PHYSICAL EXAM
[Normal Sclera/Conjunctiva] : normal sclera/conjunctiva [Normal Oropharynx] : the oropharynx was normal [Regular Rhythm] : with a regular rhythm [No Murmur] : no murmur heard [No Edema] : there was no peripheral edema [Soft] : abdomen soft [No HSM] : no HSM [Non Tender] : non-tender [Normal Anterior Cervical Nodes] : no anterior cervical lymphadenopathy [Normal Posterior Cervical Nodes] : no posterior cervical lymphadenopathy [Normal Supraclavicular Nodes] : no supraclavicular lymphadenopathy [Normal] : no rash [No Focal Deficits] : no focal deficits

## 2019-10-23 NOTE — HISTORY OF PRESENT ILLNESS
[Hypertension] : Hypertension [Other: ___] : [unfilled]: [<140/90] : Target blood pressure is <140/90 [Does not check BP] : The patient is not checking blood pressure [Target goal met] : BP target goal met [FreeTextEntry6] : c/o  fatigue  [Symptoms Limit Activities] : Symptoms do not limit ~his/her~ activities [Stable] : Overall status has been stable

## 2019-11-04 ENCOUNTER — RX RENEWAL (OUTPATIENT)
Age: 84
End: 2019-11-04

## 2019-11-04 RX ORDER — HYDROCHLOROTHIAZIDE 12.5 MG/1
12.5 CAPSULE ORAL
Qty: 90 | Refills: 1 | Status: ACTIVE | COMMUNITY
Start: 2019-05-15 | End: 1900-01-01

## 2019-11-11 ENCOUNTER — APPOINTMENT (OUTPATIENT)
Dept: ELECTROPHYSIOLOGY | Facility: CLINIC | Age: 84
End: 2019-11-11
Payer: MEDICARE

## 2019-11-11 DIAGNOSIS — I45.9 CONDUCTION DISORDER, UNSPECIFIED: ICD-10-CM

## 2019-11-11 PROCEDURE — 93294 REM INTERROG EVL PM/LDLS PM: CPT

## 2019-11-11 PROCEDURE — 93296 REM INTERROG EVL PM/IDS: CPT

## 2019-11-20 PROBLEM — I45.9 CONDUCTION DISORDER OF THE HEART: Status: ACTIVE | Noted: 2019-11-20

## 2019-11-27 ENCOUNTER — RX RENEWAL (OUTPATIENT)
Age: 84
End: 2019-11-27

## 2020-01-29 ENCOUNTER — APPOINTMENT (OUTPATIENT)
Dept: FAMILY MEDICINE | Facility: CLINIC | Age: 85
End: 2020-01-29
Payer: MEDICARE

## 2020-01-29 VITALS — DIASTOLIC BLOOD PRESSURE: 84 MMHG | RESPIRATION RATE: 16 BRPM | SYSTOLIC BLOOD PRESSURE: 124 MMHG | HEART RATE: 72 BPM

## 2020-01-29 VITALS
HEART RATE: 81 BPM | WEIGHT: 152.19 LBS | BODY MASS INDEX: 26.96 KG/M2 | DIASTOLIC BLOOD PRESSURE: 80 MMHG | HEIGHT: 63 IN | OXYGEN SATURATION: 98 % | SYSTOLIC BLOOD PRESSURE: 136 MMHG

## 2020-01-29 DIAGNOSIS — R04.0 EPISTAXIS: ICD-10-CM

## 2020-01-29 PROCEDURE — G0444 DEPRESSION SCREEN ANNUAL: CPT | Mod: 59

## 2020-01-29 PROCEDURE — 99214 OFFICE O/P EST MOD 30 MIN: CPT | Mod: 25

## 2020-01-29 NOTE — PHYSICAL EXAM
[Normal Sclera/Conjunctiva] : normal sclera/conjunctiva [Normal Oropharynx] : the oropharynx was normal [Regular Rhythm] : with a regular rhythm [No Murmur] : no murmur heard [No Edema] : there was no peripheral edema [Soft] : abdomen soft [Non Tender] : non-tender [No HSM] : no HSM [Normal Supraclavicular Nodes] : no supraclavicular lymphadenopathy [Normal Posterior Cervical Nodes] : no posterior cervical lymphadenopathy [Normal Anterior Cervical Nodes] : no anterior cervical lymphadenopathy [Normal] : no rash [No Focal Deficits] : no focal deficits [de-identified] : rt  nasal  septum bleeding  sites identified

## 2020-01-29 NOTE — HISTORY OF PRESENT ILLNESS
[Hypertension] : Hypertension [Other: ___] : [unfilled]: [Does not check BP] : The patient is not checking blood pressure [<140/90] : Target blood pressure is <140/90 [Target goal met] : BP target goal met [Stable] : Overall status has been stable [FreeTextEntry6] : broke  dentures waiting  repairs fell 3 days  ago injured  rt knee and  wrist c/o  blood and  mucous from  rt  side of nose writing a book arthritis pain in remission with methrotrexate taking omeprazole bid  bid  controls heatburn  [Symptoms Limit Activities] : Symptoms do not limit ~his/her~ activities

## 2020-02-11 ENCOUNTER — APPOINTMENT (OUTPATIENT)
Dept: ELECTROPHYSIOLOGY | Facility: CLINIC | Age: 85
End: 2020-02-11
Payer: MEDICARE

## 2020-02-11 ENCOUNTER — NON-APPOINTMENT (OUTPATIENT)
Age: 85
End: 2020-02-11

## 2020-02-11 ENCOUNTER — APPOINTMENT (OUTPATIENT)
Dept: CARDIOLOGY | Facility: CLINIC | Age: 85
End: 2020-02-11
Payer: MEDICARE

## 2020-02-11 VITALS
HEIGHT: 63 IN | WEIGHT: 152 LBS | OXYGEN SATURATION: 95 % | BODY MASS INDEX: 26.93 KG/M2 | HEART RATE: 66 BPM | DIASTOLIC BLOOD PRESSURE: 80 MMHG | SYSTOLIC BLOOD PRESSURE: 130 MMHG

## 2020-02-11 DIAGNOSIS — I45.9 CONDUCTION DISORDER, UNSPECIFIED: ICD-10-CM

## 2020-02-11 PROCEDURE — 93294 REM INTERROG EVL PM/LDLS PM: CPT

## 2020-02-11 PROCEDURE — 93296 REM INTERROG EVL PM/IDS: CPT

## 2020-02-11 PROCEDURE — 99213 OFFICE O/P EST LOW 20 MIN: CPT | Mod: 25

## 2020-02-11 PROCEDURE — 93280 PM DEVICE PROGR EVAL DUAL: CPT

## 2020-02-11 PROCEDURE — 93306 TTE W/DOPPLER COMPLETE: CPT

## 2020-02-11 PROCEDURE — 93000 ELECTROCARDIOGRAM COMPLETE: CPT

## 2020-02-11 RX ORDER — CAMPHOR, MENTHOL, WHITE PETROLATUM 1; .54; 97.92 G/100G; G/100G; G/100G
1-0.54-97.92 OINTMENT TOPICAL
Qty: 1 | Refills: 0 | Status: DISCONTINUED | COMMUNITY
Start: 2020-01-29 | End: 2020-02-11

## 2020-02-11 RX ORDER — LEVOTHYROXINE SODIUM 0.11 MG/1
112 TABLET ORAL
Refills: 0 | Status: ACTIVE | COMMUNITY
Start: 2019-03-21

## 2020-02-12 NOTE — PROCEDURE
[No] : not [NSR] : normal sinus rhythm [Pacemaker] : pacemaker [DDD] : DDD [Longevity: ___ months] : The estimated remaining battery life is [unfilled] months [Threshold Testing Performed] : Threshold testing was performed [Lead Imp:  ___ohms] : lead impedance was [unfilled] ohms [Sensing Amplitude ___mv] : sensing amplitude was [unfilled] mv [___V @] : [unfilled] V [___ ms] : [unfilled] ms [None] : none [Asense-Vpace ___ %] : Asense-Vpace [unfilled]% [de-identified] : [facility] [de-identified] : [employer] [de-identified] : 50

## 2020-02-12 NOTE — DISCUSSION/SUMMARY
[Pacemaker Function Normal] : normal pacemaker function [FreeTextEntry1] : F/U with remote monitoring every 3 months and f/u in office for device check in 1 year.\par Continue current medications.

## 2020-02-12 NOTE — ASSESSMENT
[FreeTextEntry1] : 87 y/o with dual chamber PPM with normal function, adequate sensing and pacing thresholds. Patient is in normal sinus rhythm.  AF burden <1%.  Interrogation and review or remote interrogations reveals multiple mode switch episodes. At next visit will adjust atrial sensitivity to 0.75mV and extend atrial blanking after v-sense to 85ms.

## 2020-02-15 NOTE — PHYSICAL EXAM
[Normal Appearance] : normal appearance [General Appearance - Well Developed] : well developed [Well Groomed] : well groomed [General Appearance - Well Nourished] : well nourished [No Deformities] : no deformities [General Appearance - In No Acute Distress] : no acute distress [Normal Oral Mucosa] : normal oral mucosa [Normal Conjunctiva] : the conjunctiva exhibited no abnormalities [Eyelids - No Xanthelasma] : the eyelids demonstrated no xanthelasmas [No Oral Cyanosis] : no oral cyanosis [No Oral Pallor] : no oral pallor [Respiration, Rhythm And Depth] : normal respiratory rhythm and effort [Auscultation Breath Sounds / Voice Sounds] : lungs were clear to auscultation bilaterally [Exaggerated Use Of Accessory Muscles For Inspiration] : no accessory muscle use [Heart Rate And Rhythm] : heart rate and rhythm were normal [Heart Sounds] : normal S1 and S2 [Arterial Pulses Normal] : the arterial pulses were normal [Systolic grade ___/6] : A grade [unfilled]/6 systolic murmur was heard. [Edema] : no peripheral edema present [Abdomen Soft] : soft [Abdomen Tenderness] : non-tender [] : no hepato-splenomegaly [Abdomen Mass (___ Cm)] : no abdominal mass palpated [Abnormal Walk] : normal gait [Oriented To Time, Place, And Person] : oriented to person, place, and time [Gait - Sufficient For Exercise Testing] : the gait was sufficient for exercise testing [Impaired Insight] : insight and judgment were intact [Affect] : the affect was normal [Mood] : the mood was normal [No Anxiety] : not feeling anxious [FreeTextEntry1] : no JVD or bruits

## 2020-02-15 NOTE — REVIEW OF SYSTEMS
[Negative] : Heme/Lymph [see HPI] : see HPI [Feeling Fatigued] : feeling fatigued [Shortness Of Breath] : no shortness of breath [Dyspnea on exertion] : not dyspnea during exertion [Chest Pain] : no chest pain [Lower Ext Edema] : no extremity edema [Palpitations] : no palpitations

## 2020-02-15 NOTE — REASON FOR VISIT
[Follow-Up - Clinic] : a clinic follow-up of [Hypertension] : hypertension [Hyperlipidemia] : hyperlipidemia [FreeTextEntry1] : TAVR and PPM

## 2020-02-15 NOTE — HISTORY OF PRESENT ILLNESS
[FreeTextEntry1] : Pt is an 89 y/o F who presents today for f/u.  She is s/p TAVR with Dr Christy 2/9/2018 at [facility].  Post-op heart block with subsequent PPM placement Riki Sci with Dr Koo - now following with Dr Cole.  She states that she has been feeling well, notes fatigue due to not eating properly (dentures being fixed). She denies CP, SOB, diaphoresis, palpitations, dizziness, syncope, LE edema, PND.\par She is an ex-smoker and has PMH hypothyroidism, HTN, HLD\par She takes HCTZ PRN\par She remains active - housework, aerobics every morning 15 min, plays bridge 2x/week\par \par TTE 02/2019 EF 70-75%, mod MR, mild-mod MS, TAVR, mod pHTN, sev TR\par

## 2020-02-15 NOTE — DISCUSSION/SUMMARY
[FreeTextEntry1] : Pt is a 89 y/o F who presents today for f/u, she is s/p TAVR Dr Wray with PPM placement Dr Koo 2/9/2018 at [facility]\par TTE 02/2019 EF 70-75%, mod MR, mild-mod MS, TAVR, mod pHTN, sev TR\par She is doing very well and has no complaints.  She has been following up with EP \par She has no s/s HF and remains very active\par HTN: well controlled, will monitor - c/w current meds\par Her cholesterol is high but she refuses any treatment\par monitor thyroid function and adjust medication as needed\par f/u 6 mnths or sooner PRN \par The described plan was discussed with the pt.  All questions and concerns were addressed to the best of my knowledge.\par

## 2020-03-03 ENCOUNTER — RX RENEWAL (OUTPATIENT)
Age: 85
End: 2020-03-03

## 2020-03-16 ENCOUNTER — RX RENEWAL (OUTPATIENT)
Age: 85
End: 2020-03-16

## 2020-03-24 ENCOUNTER — NON-APPOINTMENT (OUTPATIENT)
Age: 85
End: 2020-03-24

## 2020-03-25 ENCOUNTER — RX RENEWAL (OUTPATIENT)
Age: 85
End: 2020-03-25

## 2020-04-13 ENCOUNTER — RX RENEWAL (OUTPATIENT)
Age: 85
End: 2020-04-13

## 2020-05-05 ENCOUNTER — APPOINTMENT (OUTPATIENT)
Dept: FAMILY MEDICINE | Facility: CLINIC | Age: 85
End: 2020-05-05
Payer: MEDICARE

## 2020-05-05 VITALS
BODY MASS INDEX: 27.2 KG/M2 | HEIGHT: 63 IN | OXYGEN SATURATION: 96 % | HEART RATE: 80 BPM | SYSTOLIC BLOOD PRESSURE: 130 MMHG | WEIGHT: 153.5 LBS | DIASTOLIC BLOOD PRESSURE: 80 MMHG

## 2020-05-05 VITALS — SYSTOLIC BLOOD PRESSURE: 126 MMHG | DIASTOLIC BLOOD PRESSURE: 80 MMHG | RESPIRATION RATE: 16 BRPM | HEART RATE: 96 BPM

## 2020-05-05 DIAGNOSIS — E03.9 HYPOTHYROIDISM, UNSPECIFIED: ICD-10-CM

## 2020-05-05 DIAGNOSIS — I25.10 ATHEROSCLEROTIC HEART DISEASE OF NATIVE CORONARY ARTERY W/OUT ANGINA PECTORIS: ICD-10-CM

## 2020-05-05 DIAGNOSIS — F32.9 MAJOR DEPRESSIVE DISORDER, SINGLE EPISODE, UNSPECIFIED: ICD-10-CM

## 2020-05-05 DIAGNOSIS — R53.83 OTHER FATIGUE: ICD-10-CM

## 2020-05-05 PROCEDURE — 99214 OFFICE O/P EST MOD 30 MIN: CPT

## 2020-05-05 NOTE — HISTORY OF PRESENT ILLNESS
[Coronary Artery Disease] : Coronary Artery Disease [Hypertension] : Hypertension [Other: ___] : [unfilled]: [Target goal met] : BP target goal met [Does not check BP] : The patient is not checking blood pressure [<130/90] : Target blood pressure is  <130/90 [Well Controlled] : Overall status has been well controlled [FreeTextEntry6] : fell april 8  injured lower  back rt  side has  since  become  fatigue alos  going to bed during day may be  depressed still haven't gotten acceptable  dentures taking thyroid meds  [Symptoms Limit Activities] : Symptoms do not limit ~his/her~ activities

## 2020-05-05 NOTE — ASSESSMENT
[FreeTextEntry1] : pt  depressed will start prozac more activation lab evaluation for hypothyroid and always  being cold

## 2020-05-05 NOTE — PHYSICAL EXAM
[Normal Sclera/Conjunctiva] : normal sclera/conjunctiva [Normal Oropharynx] : the oropharynx was normal [No Murmur] : no murmur heard [Regular Rhythm] : with a regular rhythm [No Edema] : there was no peripheral edema [Soft] : abdomen soft [No HSM] : no HSM [Non Tender] : non-tender [Normal Posterior Cervical Nodes] : no posterior cervical lymphadenopathy [Normal Supraclavicular Nodes] : no supraclavicular lymphadenopathy [Normal Anterior Cervical Nodes] : no anterior cervical lymphadenopathy [Normal] : no rash [No Focal Deficits] : no focal deficits [de-identified] : right sided lower  back tenderness

## 2020-05-06 ENCOUNTER — TRANSCRIPTION ENCOUNTER (OUTPATIENT)
Age: 85
End: 2020-05-06

## 2020-05-06 LAB
ALBUMIN SERPL ELPH-MCNC: 4 G/DL
ALP BLD-CCNC: 106 U/L
ALT SERPL-CCNC: 14 U/L
ANION GAP SERPL CALC-SCNC: 15 MMOL/L
APPEARANCE: ABNORMAL
AST SERPL-CCNC: 18 U/L
BACTERIA: NEGATIVE
BASOPHILS # BLD AUTO: 0.11 K/UL
BASOPHILS NFR BLD AUTO: 1.3 %
BILIRUB SERPL-MCNC: 0.3 MG/DL
BILIRUBIN URINE: NEGATIVE
BLOOD URINE: NORMAL
BUN SERPL-MCNC: 16 MG/DL
CALCIUM SERPL-MCNC: 9.8 MG/DL
CHLORIDE SERPL-SCNC: 97 MMOL/L
CHOLEST SERPL-MCNC: 186 MG/DL
CHOLEST/HDLC SERPL: 4.5 RATIO
CO2 SERPL-SCNC: 24 MMOL/L
COLOR: YELLOW
CREAT SERPL-MCNC: 0.88 MG/DL
EOSINOPHIL # BLD AUTO: 1.08 K/UL
EOSINOPHIL NFR BLD AUTO: 12.3 %
GLUCOSE QUALITATIVE U: NEGATIVE
GLUCOSE SERPL-MCNC: 84 MG/DL
HCT VFR BLD CALC: 36.6 %
HDLC SERPL-MCNC: 41 MG/DL
HGB BLD-MCNC: 11.6 G/DL
HYALINE CASTS: 0 /LPF
IMM GRANULOCYTES NFR BLD AUTO: 0.8 %
KETONES URINE: NEGATIVE
LDLC SERPL CALC-MCNC: 113 MG/DL
LEUKOCYTE ESTERASE URINE: ABNORMAL
LYMPHOCYTES # BLD AUTO: 1.1 K/UL
LYMPHOCYTES NFR BLD AUTO: 12.5 %
MAN DIFF?: NORMAL
MCHC RBC-ENTMCNC: 30.1 PG
MCHC RBC-ENTMCNC: 31.7 GM/DL
MCV RBC AUTO: 95.1 FL
MICROSCOPIC-UA: NORMAL
MONOCYTES # BLD AUTO: 0.66 K/UL
MONOCYTES NFR BLD AUTO: 7.5 %
NEUTROPHILS # BLD AUTO: 5.76 K/UL
NEUTROPHILS NFR BLD AUTO: 65.6 %
NITRITE URINE: NEGATIVE
PH URINE: 6
PLATELET # BLD AUTO: 407 K/UL
POTASSIUM SERPL-SCNC: 4.7 MMOL/L
PROT SERPL-MCNC: 6.9 G/DL
PROTEIN URINE: ABNORMAL
RBC # BLD: 3.85 M/UL
RBC # FLD: 15 %
RED BLOOD CELLS URINE: 10 /HPF
SODIUM SERPL-SCNC: 136 MMOL/L
SPECIFIC GRAVITY URINE: 1.02
SQUAMOUS EPITHELIAL CELLS: >27 /HPF
T4 SERPL-MCNC: 8.3 UG/DL
TRIGL SERPL-MCNC: 159 MG/DL
TSH SERPL-ACNC: 6.15 UIU/ML
URATE SERPL-MCNC: 5 MG/DL
UROBILINOGEN URINE: ABNORMAL
WBC # FLD AUTO: 8.78 K/UL
WHITE BLOOD CELLS URINE: 208 /HPF

## 2020-05-12 RX ORDER — SERTRALINE 25 MG/1
25 TABLET, FILM COATED ORAL DAILY
Qty: 1 | Refills: 1 | Status: ACTIVE | COMMUNITY
Start: 2020-05-12 | End: 1900-01-01

## 2020-05-12 RX ORDER — FLUOXETINE HYDROCHLORIDE 10 MG/1
10 CAPSULE ORAL
Qty: 90 | Refills: 0 | Status: DISCONTINUED | COMMUNITY
Start: 2020-05-05 | End: 2020-05-12

## 2020-05-18 ENCOUNTER — APPOINTMENT (OUTPATIENT)
Dept: ELECTROPHYSIOLOGY | Facility: CLINIC | Age: 85
End: 2020-05-18
Payer: MEDICARE

## 2020-05-18 PROCEDURE — 93294 REM INTERROG EVL PM/LDLS PM: CPT

## 2020-05-18 PROCEDURE — 93296 REM INTERROG EVL PM/IDS: CPT

## 2020-05-26 ENCOUNTER — RX RENEWAL (OUTPATIENT)
Age: 85
End: 2020-05-26

## 2020-06-09 ENCOUNTER — APPOINTMENT (OUTPATIENT)
Dept: FAMILY MEDICINE | Facility: CLINIC | Age: 85
End: 2020-06-09

## 2020-06-17 ENCOUNTER — RX RENEWAL (OUTPATIENT)
Age: 85
End: 2020-06-17

## 2020-08-07 ENCOUNTER — NON-APPOINTMENT (OUTPATIENT)
Age: 85
End: 2020-08-07

## 2020-08-07 ENCOUNTER — APPOINTMENT (OUTPATIENT)
Age: 85
End: 2020-08-07
Payer: MEDICARE

## 2020-08-07 VITALS
SYSTOLIC BLOOD PRESSURE: 120 MMHG | HEIGHT: 63 IN | WEIGHT: 140 LBS | DIASTOLIC BLOOD PRESSURE: 80 MMHG | BODY MASS INDEX: 24.8 KG/M2 | HEART RATE: 63 BPM | OXYGEN SATURATION: 99 %

## 2020-08-07 DIAGNOSIS — R55 SYNCOPE AND COLLAPSE: ICD-10-CM

## 2020-08-07 PROCEDURE — 93000 ELECTROCARDIOGRAM COMPLETE: CPT

## 2020-08-07 PROCEDURE — 99214 OFFICE O/P EST MOD 30 MIN: CPT | Mod: 25

## 2020-08-07 RX ORDER — CLOPIDOGREL BISULFATE 75 MG/1
75 TABLET, FILM COATED ORAL DAILY
Qty: 90 | Refills: 2 | Status: ACTIVE | COMMUNITY
Start: 2020-08-07 | End: 1900-01-01

## 2020-08-14 LAB
ALBUMIN SERPL ELPH-MCNC: 4.3 G/DL
ALP BLD-CCNC: 99 U/L
ALT SERPL-CCNC: 13 U/L
ANION GAP SERPL CALC-SCNC: 12 MMOL/L
AST SERPL-CCNC: 18 U/L
BASOPHILS # BLD AUTO: 0.05 K/UL
BASOPHILS NFR BLD AUTO: 0.9 %
BILIRUB SERPL-MCNC: 0.3 MG/DL
BUN SERPL-MCNC: 11 MG/DL
CALCIUM SERPL-MCNC: 9.9 MG/DL
CHLORIDE SERPL-SCNC: 100 MMOL/L
CO2 SERPL-SCNC: 26 MMOL/L
CREAT SERPL-MCNC: 0.96 MG/DL
EOSINOPHIL # BLD AUTO: 0.23 K/UL
EOSINOPHIL NFR BLD AUTO: 4 %
ESTIMATED AVERAGE GLUCOSE: 105 MG/DL
GLUCOSE SERPL-MCNC: 84 MG/DL
HBA1C MFR BLD HPLC: 5.3 %
HCT VFR BLD CALC: 38 %
HGB BLD-MCNC: 12.3 G/DL
IMM GRANULOCYTES NFR BLD AUTO: 0.7 %
LYMPHOCYTES # BLD AUTO: 1.09 K/UL
LYMPHOCYTES NFR BLD AUTO: 19 %
MAGNESIUM SERPL-MCNC: 1.9 MG/DL
MAN DIFF?: NORMAL
MCHC RBC-ENTMCNC: 30.3 PG
MCHC RBC-ENTMCNC: 32.4 GM/DL
MCV RBC AUTO: 93.6 FL
MONOCYTES # BLD AUTO: 0.65 K/UL
MONOCYTES NFR BLD AUTO: 11.3 %
NEUTROPHILS # BLD AUTO: 3.68 K/UL
NEUTROPHILS NFR BLD AUTO: 64.1 %
PLATELET # BLD AUTO: 191 K/UL
POTASSIUM SERPL-SCNC: 4.9 MMOL/L
PROT SERPL-MCNC: 7 G/DL
RBC # BLD: 4.06 M/UL
RBC # FLD: 15.5 %
SODIUM SERPL-SCNC: 139 MMOL/L
TSH SERPL-ACNC: 0.75 UIU/ML
WBC # FLD AUTO: 5.74 K/UL

## 2020-08-18 ENCOUNTER — APPOINTMENT (OUTPATIENT)
Dept: ELECTROPHYSIOLOGY | Facility: CLINIC | Age: 85
End: 2020-08-18
Payer: MEDICARE

## 2020-08-18 PROCEDURE — 93294 REM INTERROG EVL PM/LDLS PM: CPT

## 2020-08-18 PROCEDURE — 93296 REM INTERROG EVL PM/IDS: CPT

## 2020-08-19 ENCOUNTER — NON-APPOINTMENT (OUTPATIENT)
Age: 85
End: 2020-08-19

## 2020-08-19 NOTE — REASON FOR VISIT
[Hyperlipidemia] : hyperlipidemia [Follow-Up - Clinic] : a clinic follow-up of [Hypertension] : hypertension [Syncope] : syncope [FreeTextEntry1] : TAVR and PPM

## 2020-08-19 NOTE — REVIEW OF SYSTEMS
[Feeling Fatigued] : feeling fatigued [Negative] : Heme/Lymph [Shortness Of Breath] : no shortness of breath [Dyspnea on exertion] : not dyspnea during exertion [Chest Pain] : no chest pain [see HPI] : see HPI [Lower Ext Edema] : no extremity edema [Palpitations] : no palpitations

## 2020-08-19 NOTE — HISTORY OF PRESENT ILLNESS
[FreeTextEntry1] : Pt is an 89 y/o F who presents today for f/u.  She is s/p TAVR with Dr Christy 2/9/2018 at [facility].  Post-op heart block with subsequent PPM placement Riki Sci with Dr Koo - now following with Dr Cole.  She states that 2 weeks ago she "fell" - came home from lunch, was changing her clothes, went out to backyard and fell coming back into the house.  She is not sure if she passed out.  She did not seek medical attention.  She denies CP, SOB, diaphoresis, palpitations, dizziness, syncope, LE edema, PND.\par She is an ex-smoker and has PMH hypothyroidism, HTN, HLD\par She takes HCTZ PRN\par She remains active\par \par TTE 02/2019 EF 70-75%, mod MR, mild-mod MS, TAVR, mod pHTN, sev TR\par

## 2020-08-19 NOTE — DISCUSSION/SUMMARY
[FreeTextEntry1] : Pt is a 87 y/o F who presents today for possible syncope, she is s/p TAVR Dr Wray with PPM placement Dr Koo 2/9/2018 at [facility]\par TTE 02/2019 EF 70-75%, mod MR, mild-mod MS, TAVR, mod pHTN, sev TR\par \par Will repeat TTE\par check labs\par check CUS\par set up for PPM interrogation\par She has no s/s HF and remains very active\par Advised pt to go to the nearest ED if symptoms persist or worsen \par HTN: well controlled, will monitor - c/w current meds\par Her cholesterol is high but she refuses any treatment\par monitor thyroid function and adjust medication as needed\par f/u 1 mnths or sooner PRN \par The described plan was discussed with the pt and family member.  All questions and concerns were addressed to the best of my knowledge.\par

## 2020-09-21 ENCOUNTER — APPOINTMENT (OUTPATIENT)
Dept: CARDIOLOGY | Facility: CLINIC | Age: 85
End: 2020-09-21
Payer: MEDICARE

## 2020-09-21 PROCEDURE — 93880 EXTRACRANIAL BILAT STUDY: CPT | Mod: 26

## 2020-09-21 PROCEDURE — 93306 TTE W/DOPPLER COMPLETE: CPT

## 2020-10-12 RX ORDER — LEVOTHYROXINE SODIUM 0.1 MG/1
100 TABLET ORAL
Qty: 90 | Refills: 1 | Status: ACTIVE | COMMUNITY
Start: 2020-04-13 | End: 1900-01-01

## 2020-10-18 NOTE — PATIENT PROFILE ADULT. - TEACHING/LEARNING RELIGIOUS CONSIDERATIONS OTHER
patient seen at bedside. She indicated that she agrees with peg tube placement. We will consult acute care surgery tomorrow. Otherwise, plan to continue to work on strength.  none

## 2020-11-18 ENCOUNTER — APPOINTMENT (OUTPATIENT)
Dept: ELECTROPHYSIOLOGY | Facility: CLINIC | Age: 85
End: 2020-11-18
Payer: MEDICARE

## 2020-11-18 PROCEDURE — 93296 REM INTERROG EVL PM/IDS: CPT

## 2020-11-18 PROCEDURE — 93294 REM INTERROG EVL PM/LDLS PM: CPT

## 2021-02-16 ENCOUNTER — NON-APPOINTMENT (OUTPATIENT)
Age: 86
End: 2021-02-16

## 2021-02-16 ENCOUNTER — APPOINTMENT (OUTPATIENT)
Dept: CARDIOLOGY | Facility: CLINIC | Age: 86
End: 2021-02-16
Payer: MEDICARE

## 2021-02-16 VITALS
DIASTOLIC BLOOD PRESSURE: 80 MMHG | HEART RATE: 69 BPM | WEIGHT: 146 LBS | OXYGEN SATURATION: 97 % | HEIGHT: 63 IN | BODY MASS INDEX: 25.87 KG/M2 | SYSTOLIC BLOOD PRESSURE: 120 MMHG

## 2021-02-16 DIAGNOSIS — I35.0 NONRHEUMATIC AORTIC (VALVE) STENOSIS: ICD-10-CM

## 2021-02-16 PROCEDURE — 93000 ELECTROCARDIOGRAM COMPLETE: CPT

## 2021-02-16 PROCEDURE — 99214 OFFICE O/P EST MOD 30 MIN: CPT | Mod: 25

## 2021-02-16 NOTE — REASON FOR VISIT
[Follow-Up - Clinic] : a clinic follow-up of [Hyperlipidemia] : hyperlipidemia [Hypertension] : hypertension [Syncope] : syncope [FreeTextEntry1] : TAVR and PPM

## 2021-02-16 NOTE — HISTORY OF PRESENT ILLNESS
[FreeTextEntry1] : Pt is an 89 y/o F who presents today for f/u.  She is s/p TAVR with Dr Christy 2/9/2018 at [facility].  Post-op heart block with subsequent PPM placement Riki Sci with Dr Koo - now following with Dr Cole.  She states that she feels well overall and has no cardiac complaints.  She denies CP, SOB, diaphoresis, palpitations, dizziness, syncope, LE edema, PND, orthopnea.\par She is an ex-smoker and has PMH hypothyroidism, HTN, HLD\par She takes HCTZ PRN\par She remains active, ambulates with cane\par \par TTE 02/2019 EF 70-75%, mod MR, mild-mod MS, TAVR, mod pHTN, sev TR\par TTE 09/2020 EF 55-60%, mod MR, mild-mod MS, TAVR with normal function, mild-mod TR\par CUS 09/2020 mild-mod plaque b/l\par

## 2021-02-16 NOTE — DISCUSSION/SUMMARY
[FreeTextEntry1] : Pt is a 89 y/o F s/p TAVR Dr Wray with PPM placement Dr Koo 2/9/2018 at [facility]\par TTE 02/2019 EF 70-75%, mod MR, mild-mod MS, TAVR, mod pHTN, mod TR\par \par She has appt with EP next mnth\par She has no s/s HF and remains very active\par HTN: well controlled, will monitor - c/w current meds\par Her cholesterol is high but she refuses any treatment\par monitor thyroid function and adjust medication as needed\par Pt will return in 6 mnths or sooner as needed but I encouraged communication via phone or portal if necessary. \par The described plan was discussed with the pt and family member.  All questions and concerns were addressed to the best of my knowledge.\par

## 2021-03-09 ENCOUNTER — APPOINTMENT (OUTPATIENT)
Dept: ELECTROPHYSIOLOGY | Facility: CLINIC | Age: 86
End: 2021-03-09
Payer: MEDICARE

## 2021-03-09 VITALS
HEIGHT: 63 IN | SYSTOLIC BLOOD PRESSURE: 157 MMHG | WEIGHT: 146 LBS | OXYGEN SATURATION: 96 % | BODY MASS INDEX: 25.87 KG/M2 | DIASTOLIC BLOOD PRESSURE: 75 MMHG | HEART RATE: 64 BPM

## 2021-03-09 DIAGNOSIS — I44.30 UNSPECIFIED ATRIOVENTRICULAR BLOCK: ICD-10-CM

## 2021-03-09 PROCEDURE — 93280 PM DEVICE PROGR EVAL DUAL: CPT

## 2021-03-09 NOTE — REASON FOR VISIT
[Follow-up Device Check] : is here today for a follow-up device check visit for [Family Member] : family member

## 2021-03-09 NOTE — PROCEDURE
[No] : not [NSR] : normal sinus rhythm [Pacemaker] : pacemaker [DDD] : DDD [Longevity: ___ months] : The estimated remaining battery life is [unfilled] months [Threshold Testing Performed] : Threshold testing was performed [Lead Imp:  ___ohms] : lead impedance was [unfilled] ohms [Sensing Amplitude ___mv] : sensing amplitude was [unfilled] mv [___V @] : [unfilled] V [___ ms] : [unfilled] ms [None] : none [Counters Reset] : the counters were reset [Asense-Vpace ___ %] : Asense-Vpace [unfilled]% [de-identified] : [facility] [de-identified] : [employer] [de-identified] : [id number] [de-identified] : 2/9/2018 [de-identified] : 50 [de-identified] : brief atrial tach, no true AF seen, <1%\par She has no complaints of any symptoms, says she is active and feels well.\par Battery status 6.5 yrs.\par Continue metoprolol.\par Normal dual PPM function.\par Remotes Q 3 mo, f/u in office in 1 year.\par \par \par

## 2021-06-09 ENCOUNTER — APPOINTMENT (OUTPATIENT)
Dept: ELECTROPHYSIOLOGY | Facility: CLINIC | Age: 86
End: 2021-06-09
Payer: MEDICARE

## 2021-06-10 ENCOUNTER — NON-APPOINTMENT (OUTPATIENT)
Age: 86
End: 2021-06-10

## 2021-06-10 PROCEDURE — 93294 REM INTERROG EVL PM/LDLS PM: CPT

## 2021-06-10 PROCEDURE — 93296 REM INTERROG EVL PM/IDS: CPT

## 2021-08-16 ENCOUNTER — APPOINTMENT (OUTPATIENT)
Dept: CARDIOLOGY | Facility: CLINIC | Age: 86
End: 2021-08-16
Payer: MEDICARE

## 2021-08-16 ENCOUNTER — NON-APPOINTMENT (OUTPATIENT)
Age: 86
End: 2021-08-16

## 2021-08-16 VITALS
BODY MASS INDEX: 26.58 KG/M2 | WEIGHT: 150 LBS | DIASTOLIC BLOOD PRESSURE: 80 MMHG | SYSTOLIC BLOOD PRESSURE: 160 MMHG | OXYGEN SATURATION: 96 % | HEIGHT: 63 IN | HEART RATE: 65 BPM

## 2021-08-16 DIAGNOSIS — I35.0 NONRHEUMATIC AORTIC (VALVE) STENOSIS: ICD-10-CM

## 2021-08-16 DIAGNOSIS — I10 ESSENTIAL (PRIMARY) HYPERTENSION: ICD-10-CM

## 2021-08-16 DIAGNOSIS — Z95.0 PRESENCE OF CARDIAC PACEMAKER: ICD-10-CM

## 2021-08-16 DIAGNOSIS — E78.5 HYPERLIPIDEMIA, UNSPECIFIED: ICD-10-CM

## 2021-08-16 DIAGNOSIS — Z95.2 PRESENCE OF PROSTHETIC HEART VALVE: ICD-10-CM

## 2021-08-16 PROCEDURE — 93306 TTE W/DOPPLER COMPLETE: CPT

## 2021-08-16 PROCEDURE — 99214 OFFICE O/P EST MOD 30 MIN: CPT | Mod: 25

## 2021-08-16 PROCEDURE — 93000 ELECTROCARDIOGRAM COMPLETE: CPT

## 2021-08-16 NOTE — HISTORY OF PRESENT ILLNESS
[FreeTextEntry1] : Pt is an 88 y/o F who presents today for f/u.  She is s/p TAVR with Dr Christy 2/9/2018 at [facility].  Post-op heart block with subsequent PPM placement Riki Sci with Dr Koo - now following with Dr Cole.  She states that she feels well overall and has no cardiac complaints.  She denies CP, SOB, diaphoresis, palpitations, dizziness, syncope, LE edema, PND, orthopnea.\par She is an ex-smoker and has PMH hypothyroidism, HTN, HLD\par She takes HCTZ PRN\par She remains active, ambulates with cane\par COVID vaccine 04/2021 Pfizer\par \par TTE 02/2019 EF 70-75%, mod MR, mild-mod MS, TAVR, mod pHTN, sev TR\par TTE 09/2020 EF 55-60%, mod MR, mild-mod MS, TAVR with normal function, mild-mod TR\par CUS 09/2020 mild-mod plaque b/l\par

## 2021-08-16 NOTE — PHYSICAL EXAM
[Well Developed] : well developed [Well Nourished] : well nourished [No Acute Distress] : no acute distress [Normal Conjunctiva] : normal conjunctiva [Normal Venous Pressure] : normal venous pressure [No Carotid Bruit] : no carotid bruit [Normal S1, S2] : normal S1, S2 [No Rub] : no rub [No Gallop] : no gallop [Clear Lung Fields] : clear lung fields [Good Air Entry] : good air entry [No Respiratory Distress] : no respiratory distress  [Soft] : abdomen soft [Non Tender] : non-tender [No Masses/organomegaly] : no masses/organomegaly [Normal Bowel Sounds] : normal bowel sounds [Normal Gait] : normal gait [No Cyanosis] : no cyanosis [No Edema] : no edema [No Clubbing] : no clubbing [No Varicosities] : no varicosities [No Rash] : no rash [No Skin Lesions] : no skin lesions [Moves all extremities] : moves all extremities [No Focal Deficits] : no focal deficits [Normal Speech] : normal speech [Alert and Oriented] : alert and oriented [Normal memory] : normal memory [de-identified] : + sys murmur

## 2021-08-16 NOTE — REASON FOR VISIT
[Structural Heart and Valve Disease] : structural heart and valve disease [Hypertension] : hypertension

## 2021-08-16 NOTE — DISCUSSION/SUMMARY
[FreeTextEntry1] : Pt is a 89 y/o F s/p TAVR Dr Wray with PPM placement Dr Koo 2/9/2018 at [facility]\par TTE 02/2019 EF 70-75%, mod MR, mild-mod MS, TAVR, mod pHTN, mod TR\par \par AS s/p TAVR:\par She has no s/s HF and remains very active\par repeat TTE\par \par HTN: \par elevated today but usually well controlled, will monitor \par c/w current meds\par \par HLD:\par Her cholesterol is high but she refuses any treatment\par get recent lab results from PCP\par monitor thyroid function \par \par Pt will return in 6 mnths or sooner as needed but I encouraged communication via phone or portal if necessary. \par The described plan was discussed with the pt and family member.  All questions and concerns were addressed to the best of my knowledge.\par

## 2021-08-19 ENCOUNTER — RESULT CHARGE (OUTPATIENT)
Age: 86
End: 2021-08-19

## 2021-09-09 ENCOUNTER — APPOINTMENT (OUTPATIENT)
Dept: ELECTROPHYSIOLOGY | Facility: CLINIC | Age: 86
End: 2021-09-09
Payer: MEDICARE

## 2021-09-09 ENCOUNTER — NON-APPOINTMENT (OUTPATIENT)
Age: 86
End: 2021-09-09

## 2021-09-09 PROCEDURE — 93294 REM INTERROG EVL PM/LDLS PM: CPT

## 2021-09-09 PROCEDURE — 93296 REM INTERROG EVL PM/IDS: CPT

## 2021-12-09 ENCOUNTER — NON-APPOINTMENT (OUTPATIENT)
Age: 86
End: 2021-12-09

## 2021-12-09 ENCOUNTER — APPOINTMENT (OUTPATIENT)
Dept: ELECTROPHYSIOLOGY | Facility: CLINIC | Age: 86
End: 2021-12-09
Payer: MEDICARE

## 2021-12-09 PROCEDURE — 93294 REM INTERROG EVL PM/LDLS PM: CPT | Mod: NC

## 2021-12-09 PROCEDURE — 93296 REM INTERROG EVL PM/IDS: CPT | Mod: NC

## 2021-12-16 NOTE — H&P PST ADULT - BP NONINVASIVE DIASTOLIC (MM HG)
[address] [facility]  [phone]        Pre-Op Phone Call:     Patient Name: Jose Alfredo Braden     Telephone Information:   Mobile [phone]     Home phone:  [phone]    Surgery Time:    9:40 AM     Arrival Time:  8620   Left extended Message: Unable to leave voicemail. Attempted to call home phone number too      Message left with:     Recent change in health status:  NA     Advised of transportation/ policy:  Yes     NPO policy reviewed: Yes     Advised to take morning heart/blood pressure medications with sips of water morning of surgery? Yes     Instructed to bring eye drops, photo identification, and insurance card day of surgery? Yes     Advised to wear short sleeved button down shirt (no T-shirt underneath):  Yes     Advised not to wear jewelry, hairpins, or pantyhose day of surgery? Yes     Advised not to wear make-up and to wash face day of surgery? Yes    Remarks:  UNABLE  Heri Acharyapahoe     Electronically signed by:   Valeria Mercedes RN at 12/16/2021 8:45 AM 73

## 2022-03-09 ENCOUNTER — APPOINTMENT (OUTPATIENT)
Dept: ELECTROPHYSIOLOGY | Facility: CLINIC | Age: 87
End: 2022-03-09
Payer: MEDICARE

## 2022-03-10 ENCOUNTER — NON-APPOINTMENT (OUTPATIENT)
Age: 87
End: 2022-03-10

## 2022-03-10 PROCEDURE — 93296 REM INTERROG EVL PM/IDS: CPT

## 2022-03-10 PROCEDURE — 93294 REM INTERROG EVL PM/LDLS PM: CPT

## 2022-04-18 NOTE — PATIENT PROFILE ADULT. - VISION (WITH CORRECTIVE LENSES IF THE PATIENT USUALLY WEARS THEM):
Normal vision: sees adequately in most situations; can see medication labels, newsprint
Hide Additional Notes?: No
Detail Level: Generalized

## 2022-04-20 RX ORDER — VALACYCLOVIR 500 MG/1
1 TABLET, FILM COATED ORAL
Qty: 0 | Refills: 0 | DISCHARGE
Start: 2022-04-20 | End: 2022-04-29

## 2022-04-22 ENCOUNTER — INPATIENT (INPATIENT)
Facility: HOSPITAL | Age: 87
LOS: 4 days | Discharge: HOME | DRG: 813 | End: 2022-04-27
Attending: HOSPITALIST | Admitting: INTERNAL MEDICINE
Payer: MEDICARE

## 2022-04-22 VITALS
WEIGHT: 164.91 LBS | DIASTOLIC BLOOD PRESSURE: 92 MMHG | HEART RATE: 87 BPM | OXYGEN SATURATION: 100 % | HEIGHT: 62 IN | TEMPERATURE: 98 F | RESPIRATION RATE: 18 BRPM | SYSTOLIC BLOOD PRESSURE: 199 MMHG

## 2022-04-22 DIAGNOSIS — Z98.890 OTHER SPECIFIED POSTPROCEDURAL STATES: Chronic | ICD-10-CM

## 2022-04-22 DIAGNOSIS — Z90.89 ACQUIRED ABSENCE OF OTHER ORGANS: Chronic | ICD-10-CM

## 2022-04-22 DIAGNOSIS — H26.9 UNSPECIFIED CATARACT: Chronic | ICD-10-CM

## 2022-04-22 DIAGNOSIS — Z90.49 ACQUIRED ABSENCE OF OTHER SPECIFIED PARTS OF DIGESTIVE TRACT: Chronic | ICD-10-CM

## 2022-04-22 LAB
ALBUMIN SERPL ELPH-MCNC: 3.6 G/DL — SIGNIFICANT CHANGE UP (ref 3.3–5)
ALP SERPL-CCNC: 94 U/L — SIGNIFICANT CHANGE UP (ref 40–120)
ALT FLD-CCNC: 31 U/L — SIGNIFICANT CHANGE UP (ref 12–78)
ANION GAP SERPL CALC-SCNC: 8 MMOL/L — SIGNIFICANT CHANGE UP (ref 5–17)
APPEARANCE UR: CLEAR — SIGNIFICANT CHANGE UP
APTT BLD: 28.9 SEC — SIGNIFICANT CHANGE UP (ref 27.5–35.5)
AST SERPL-CCNC: 30 U/L — SIGNIFICANT CHANGE UP (ref 15–37)
BASOPHILS # BLD AUTO: 0.02 K/UL — SIGNIFICANT CHANGE UP (ref 0–0.2)
BASOPHILS NFR BLD AUTO: 0.4 % — SIGNIFICANT CHANGE UP (ref 0–2)
BILIRUB SERPL-MCNC: 1.1 MG/DL — SIGNIFICANT CHANGE UP (ref 0.2–1.2)
BILIRUB UR-MCNC: NEGATIVE — SIGNIFICANT CHANGE UP
BUN SERPL-MCNC: 20 MG/DL — SIGNIFICANT CHANGE UP (ref 7–23)
CALCIUM SERPL-MCNC: 9 MG/DL — SIGNIFICANT CHANGE UP (ref 8.5–10.1)
CHLORIDE SERPL-SCNC: 107 MMOL/L — SIGNIFICANT CHANGE UP (ref 96–108)
CO2 SERPL-SCNC: 23 MMOL/L — SIGNIFICANT CHANGE UP (ref 22–31)
COLOR SPEC: ABNORMAL
CREAT SERPL-MCNC: 1 MG/DL — SIGNIFICANT CHANGE UP (ref 0.5–1.3)
DIFF PNL FLD: ABNORMAL
EGFR: 54 ML/MIN/1.73M2 — LOW
EOSINOPHIL # BLD AUTO: 0.06 K/UL — SIGNIFICANT CHANGE UP (ref 0–0.5)
EOSINOPHIL NFR BLD AUTO: 1.3 % — SIGNIFICANT CHANGE UP (ref 0–6)
GLUCOSE SERPL-MCNC: 96 MG/DL — SIGNIFICANT CHANGE UP (ref 70–99)
GLUCOSE UR QL: NEGATIVE — SIGNIFICANT CHANGE UP
HCT VFR BLD CALC: 35.1 % — SIGNIFICANT CHANGE UP (ref 34.5–45)
HGB BLD-MCNC: 11.7 G/DL — SIGNIFICANT CHANGE UP (ref 11.5–15.5)
IMM GRANULOCYTES NFR BLD AUTO: 1.3 % — SIGNIFICANT CHANGE UP (ref 0–1.5)
INR BLD: 1.03 RATIO — SIGNIFICANT CHANGE UP (ref 0.88–1.16)
KETONES UR-MCNC: ABNORMAL
LACTATE SERPL-SCNC: 0.8 MMOL/L — SIGNIFICANT CHANGE UP (ref 0.7–2)
LEUKOCYTE ESTERASE UR-ACNC: ABNORMAL
LYMPHOCYTES # BLD AUTO: 0.42 K/UL — LOW (ref 1–3.3)
LYMPHOCYTES # BLD AUTO: 8.8 % — LOW (ref 13–44)
MCHC RBC-ENTMCNC: 30.8 PG — SIGNIFICANT CHANGE UP (ref 27–34)
MCHC RBC-ENTMCNC: 33.3 GM/DL — SIGNIFICANT CHANGE UP (ref 32–36)
MCV RBC AUTO: 92.4 FL — SIGNIFICANT CHANGE UP (ref 80–100)
MONOCYTES # BLD AUTO: 0.03 K/UL — SIGNIFICANT CHANGE UP (ref 0–0.9)
MONOCYTES NFR BLD AUTO: 0.6 % — LOW (ref 2–14)
NEUTROPHILS # BLD AUTO: 4.19 K/UL — SIGNIFICANT CHANGE UP (ref 1.8–7.4)
NEUTROPHILS NFR BLD AUTO: 87.6 % — HIGH (ref 43–77)
NITRITE UR-MCNC: NEGATIVE — SIGNIFICANT CHANGE UP
PH UR: 5 — SIGNIFICANT CHANGE UP (ref 5–8)
PLATELET # BLD AUTO: 31 K/UL — LOW (ref 150–400)
POTASSIUM SERPL-MCNC: 4.1 MMOL/L — SIGNIFICANT CHANGE UP (ref 3.5–5.3)
POTASSIUM SERPL-SCNC: 4.1 MMOL/L — SIGNIFICANT CHANGE UP (ref 3.5–5.3)
PROT SERPL-MCNC: 7.5 GM/DL — SIGNIFICANT CHANGE UP (ref 6–8.3)
PROT UR-MCNC: 15
PROTHROM AB SERPL-ACNC: 12 SEC — SIGNIFICANT CHANGE UP (ref 10.5–13.4)
RBC # BLD: 3.8 M/UL — SIGNIFICANT CHANGE UP (ref 3.8–5.2)
RBC # FLD: 14.2 % — SIGNIFICANT CHANGE UP (ref 10.3–14.5)
SODIUM SERPL-SCNC: 138 MMOL/L — SIGNIFICANT CHANGE UP (ref 135–145)
SP GR SPEC: 1.02 — SIGNIFICANT CHANGE UP (ref 1.01–1.02)
TROPONIN I, HIGH SENSITIVITY RESULT: 13.1 NG/L — SIGNIFICANT CHANGE UP
TROPONIN I, HIGH SENSITIVITY RESULT: 15.1 NG/L — SIGNIFICANT CHANGE UP
TROPONIN I, HIGH SENSITIVITY RESULT: 20.34 NG/L — SIGNIFICANT CHANGE UP
UROBILINOGEN FLD QL: 1
WBC # BLD: 4.78 K/UL — SIGNIFICANT CHANGE UP (ref 3.8–10.5)
WBC # FLD AUTO: 4.78 K/UL — SIGNIFICANT CHANGE UP (ref 3.8–10.5)

## 2022-04-22 PROCEDURE — 36415 COLL VENOUS BLD VENIPUNCTURE: CPT

## 2022-04-22 PROCEDURE — 84165 PROTEIN E-PHORESIS SERUM: CPT

## 2022-04-22 PROCEDURE — P9100: CPT

## 2022-04-22 PROCEDURE — 83540 ASSAY OF IRON: CPT

## 2022-04-22 PROCEDURE — 74177 CT ABD & PELVIS W/CONTRAST: CPT

## 2022-04-22 PROCEDURE — 87798 DETECT AGENT NOS DNA AMP: CPT

## 2022-04-22 PROCEDURE — 97530 THERAPEUTIC ACTIVITIES: CPT | Mod: GP

## 2022-04-22 PROCEDURE — 86618 LYME DISEASE ANTIBODY: CPT

## 2022-04-22 PROCEDURE — 84484 ASSAY OF TROPONIN QUANT: CPT

## 2022-04-22 PROCEDURE — 84155 ASSAY OF PROTEIN SERUM: CPT

## 2022-04-22 PROCEDURE — 86900 BLOOD TYPING SEROLOGIC ABO: CPT

## 2022-04-22 PROCEDURE — 85027 COMPLETE CBC AUTOMATED: CPT

## 2022-04-22 PROCEDURE — 86334 IMMUNOFIX E-PHORESIS SERUM: CPT

## 2022-04-22 PROCEDURE — 85384 FIBRINOGEN ACTIVITY: CPT

## 2022-04-22 PROCEDURE — 93306 TTE W/DOPPLER COMPLETE: CPT

## 2022-04-22 PROCEDURE — 85730 THROMBOPLASTIN TIME PARTIAL: CPT

## 2022-04-22 PROCEDURE — 83605 ASSAY OF LACTIC ACID: CPT

## 2022-04-22 PROCEDURE — 85379 FIBRIN DEGRADATION QUANT: CPT

## 2022-04-22 PROCEDURE — 85610 PROTHROMBIN TIME: CPT

## 2022-04-22 PROCEDURE — 97116 GAIT TRAINING THERAPY: CPT | Mod: GP

## 2022-04-22 PROCEDURE — 86850 RBC ANTIBODY SCREEN: CPT

## 2022-04-22 PROCEDURE — 82746 ASSAY OF FOLIC ACID SERUM: CPT

## 2022-04-22 PROCEDURE — 80048 BASIC METABOLIC PNL TOTAL CA: CPT

## 2022-04-22 PROCEDURE — 36430 TRANSFUSION BLD/BLD COMPNT: CPT

## 2022-04-22 PROCEDURE — 86901 BLOOD TYPING SEROLOGIC RH(D): CPT

## 2022-04-22 PROCEDURE — 99222 1ST HOSP IP/OBS MODERATE 55: CPT

## 2022-04-22 PROCEDURE — 82728 ASSAY OF FERRITIN: CPT

## 2022-04-22 PROCEDURE — 99285 EMERGENCY DEPT VISIT HI MDM: CPT | Mod: FS

## 2022-04-22 PROCEDURE — 81001 URINALYSIS AUTO W/SCOPE: CPT

## 2022-04-22 PROCEDURE — 84443 ASSAY THYROID STIM HORMONE: CPT

## 2022-04-22 PROCEDURE — 82607 VITAMIN B-12: CPT

## 2022-04-22 PROCEDURE — 85045 AUTOMATED RETICULOCYTE COUNT: CPT

## 2022-04-22 PROCEDURE — 84100 ASSAY OF PHOSPHORUS: CPT

## 2022-04-22 PROCEDURE — P9037: CPT

## 2022-04-22 PROCEDURE — 83615 LACTATE (LD) (LDH) ENZYME: CPT

## 2022-04-22 PROCEDURE — 70450 CT HEAD/BRAIN W/O DYE: CPT | Mod: 26,MA

## 2022-04-22 PROCEDURE — 93010 ELECTROCARDIOGRAM REPORT: CPT

## 2022-04-22 PROCEDURE — 87086 URINE CULTURE/COLONY COUNT: CPT

## 2022-04-22 PROCEDURE — 95816 EEG AWAKE AND DROWSY: CPT

## 2022-04-22 PROCEDURE — 85025 COMPLETE CBC W/AUTO DIFF WBC: CPT

## 2022-04-22 PROCEDURE — 83735 ASSAY OF MAGNESIUM: CPT

## 2022-04-22 RX ORDER — HYDROCHLOROTHIAZIDE 25 MG
12.5 TABLET ORAL DAILY
Refills: 0 | Status: DISCONTINUED | OUTPATIENT
Start: 2022-04-22 | End: 2022-04-27

## 2022-04-22 RX ORDER — AMLODIPINE BESYLATE 2.5 MG/1
5 TABLET ORAL DAILY
Refills: 0 | Status: DISCONTINUED | OUTPATIENT
Start: 2022-04-22 | End: 2022-04-27

## 2022-04-22 RX ORDER — ONDANSETRON 8 MG/1
4 TABLET, FILM COATED ORAL EVERY 8 HOURS
Refills: 0 | Status: DISCONTINUED | OUTPATIENT
Start: 2022-04-22 | End: 2022-04-27

## 2022-04-22 RX ORDER — OMEPRAZOLE 10 MG/1
1 CAPSULE, DELAYED RELEASE ORAL
Qty: 0 | Refills: 0 | DISCHARGE

## 2022-04-22 RX ORDER — LANOLIN ALCOHOL/MO/W.PET/CERES
3 CREAM (GRAM) TOPICAL AT BEDTIME
Refills: 0 | Status: DISCONTINUED | OUTPATIENT
Start: 2022-04-22 | End: 2022-04-27

## 2022-04-22 RX ORDER — ASPIRIN/CALCIUM CARB/MAGNESIUM 324 MG
81 TABLET ORAL DAILY
Refills: 0 | Status: DISCONTINUED | OUTPATIENT
Start: 2022-04-22 | End: 2022-04-23

## 2022-04-22 RX ORDER — FOLIC ACID 0.8 MG
1 TABLET ORAL DAILY
Refills: 0 | Status: DISCONTINUED | OUTPATIENT
Start: 2022-04-22 | End: 2022-04-27

## 2022-04-22 RX ORDER — VALACYCLOVIR 500 MG/1
500 TABLET, FILM COATED ORAL
Refills: 0 | Status: DISCONTINUED | OUTPATIENT
Start: 2022-04-22 | End: 2022-04-24

## 2022-04-22 RX ORDER — DIPHENHYDRAMINE HYDROCHLORIDE AND LIDOCAINE HYDROCHLORIDE AND ALUMINUM HYDROXIDE AND MAGNESIUM HYDRO
10 KIT EVERY 4 HOURS
Refills: 0 | Status: DISCONTINUED | OUTPATIENT
Start: 2022-04-22 | End: 2022-04-23

## 2022-04-22 RX ORDER — METOPROLOL TARTRATE 50 MG
25 TABLET ORAL DAILY
Refills: 0 | Status: DISCONTINUED | OUTPATIENT
Start: 2022-04-22 | End: 2022-04-27

## 2022-04-22 RX ORDER — CHONDROITIN SULFATE A SODIUM 100 %
1200 POWDER (GRAM) MISCELLANEOUS
Qty: 0 | Refills: 0 | DISCHARGE

## 2022-04-22 RX ORDER — PANTOPRAZOLE SODIUM 20 MG/1
40 TABLET, DELAYED RELEASE ORAL
Refills: 0 | Status: DISCONTINUED | OUTPATIENT
Start: 2022-04-22 | End: 2022-04-27

## 2022-04-22 RX ORDER — ACETAMINOPHEN 500 MG
650 TABLET ORAL EVERY 6 HOURS
Refills: 0 | Status: DISCONTINUED | OUTPATIENT
Start: 2022-04-22 | End: 2022-04-27

## 2022-04-22 RX ORDER — LEVOTHYROXINE SODIUM 125 MCG
100 TABLET ORAL DAILY
Refills: 0 | Status: DISCONTINUED | OUTPATIENT
Start: 2022-04-22 | End: 2022-04-27

## 2022-04-22 RX ORDER — PREGABALIN 225 MG/1
1000 CAPSULE ORAL DAILY
Refills: 0 | Status: DISCONTINUED | OUTPATIENT
Start: 2022-04-22 | End: 2022-04-27

## 2022-04-22 RX ADMIN — Medication 650 MILLIGRAM(S): at 16:37

## 2022-04-22 RX ADMIN — Medication 81 MILLIGRAM(S): at 14:48

## 2022-04-22 RX ADMIN — Medication 650 MILLIGRAM(S): at 15:40

## 2022-04-22 RX ADMIN — Medication 30 MILLILITER(S): at 14:48

## 2022-04-22 NOTE — ED ADULT NURSE NOTE - SUICIDE SCREENING QUESTION 1
The patient is Stable - Low risk of patient condition declining or worsening         Progress made toward(s) clinical / shift goals:  Pain control with current regimen     Patient is not progressing towards the following goals: N/A    Problem: Pain - Post Surgery  Goal: Alleviation or reduction of pain post surgery  Outcome: Progressing     Problem: Respiratory/Oxygenation Function Post-Surgical  Goal: Patient will achieve/maintain normal respiratory rate/effort  Outcome: Progressing     Problem: Risk for Post Op Fluid Imbalance  Goal: Promotion of fluid balance  Outcome: Progressing          No

## 2022-04-22 NOTE — PROCEDURE NOTE - ADDITIONAL PROCEDURE DETAILS
Dual chamber pacemaker with normal function, adequate sensing and pacing thresholds.  Stored data reveals no recent events for review, AF burden <1%, atrial pacing 3%, ventricular pacing 34%.  Full report with chart.  Recommend routine device follow up in three months as scheduled.

## 2022-04-22 NOTE — H&P ADULT - HISTORY OF PRESENT ILLNESS
89 F hx osteoarthritis, mitral regurgitation, hypothyroid, syncope, GERD, RA  HTN, HLD, aortic valve stenosis here s/p syncopal episode. pt woke up today feeling generally unwell and has been having severe pain in her mouth due to oral lesions. pt went to see dentist 2 days ago and was started on Valtrex for possible viral infection. pt was following up with Dr. Flores today and after examination was sitting on a chair when daughter noticed her head went forward. pt's top denture  fell out and she was drooling and pt was unresponsive for a few minutes and then pt seemed to wake up. pt with some difficulty with speech at first but that has self-resolved. pt taken by ambulance to the ER for further evaluation. pt denies fever, chills, cough, shortness of breath, chest pain prior to episode of present in the ER. pt has a [employer] PPM. also of note, pt in 2015 pt s/p fall and had a brain bleed. Pt back to baseline.

## 2022-04-22 NOTE — ED PROVIDER NOTE - NEUROLOGICAL, MLM
Alert and oriented x 3, cranial nerves intact, slight  hearing left ear , normal speech, no focal motor sensory deficits, NIHSS = 0

## 2022-04-22 NOTE — ED PROVIDER NOTE - CARE PLAN
1 Principal Discharge DX:	Syncope  Secondary Diagnosis:	TIA (transient ischemic attack)  Secondary Diagnosis:	Oral herpes

## 2022-04-22 NOTE — ED PROVIDER NOTE - ENMT, MLM
head: normocephalic, atraumatic.  oral pharynx clear. gums and upper buccal mucosa (+) erythematous sores, superficial, not actively bleeding, Mucus membranes minimally dry

## 2022-04-22 NOTE — H&P ADULT - ASSESSMENT
* Near LOC  PPM interrogated  no associated cardiac symptoms  r/o seizure  PT eval  orthostatics     * PPM after TAVR    * HTN  resume antiHTN minus HCTZ    * Swish and spit, continue with Valtrex

## 2022-04-22 NOTE — H&P ADULT - NSHPPHYSICALEXAM_GEN_ALL_CORE
Vital Signs Last 24 Hrs  T(C): 38.2 (22 Apr 2022 15:02), Max: 38.2 (22 Apr 2022 15:02)  T(F): 100.8 (22 Apr 2022 15:02), Max: 100.8 (22 Apr 2022 15:02)  HR: 89 (22 Apr 2022 15:02) (87 - 89)  BP: 165/73 (22 Apr 2022 15:02) (165/73 - 199/92)  BP(mean): 100 (22 Apr 2022 15:02) (100 - 100)  RR: 18 (22 Apr 2022 15:02) (18 - 18)  SpO2: 98% (22 Apr 2022 15:02) (98% - 100%)      heent nc at , aphthous lesions rare  neck supple no jvd  chest cta  cvs s1s2 reg no m/g/r  abd soft nt nd +bs  extr no e/c/c  neuro nonfocal   voiding  skin no rash

## 2022-04-22 NOTE — ED PROVIDER NOTE - CLINICAL SUMMARY MEDICAL DECISION MAKING FREE TEXT BOX
89 y/o F hx osteoarthritis, mitral regurgitation, hypothyroid, syncope, GERD, RA  HTN, HLD, aortic valve stenosis  BIBA from PCP office s/p witnessed syncopal episode subsequent drooling episode. on ED arrival, pt neurologically intact, BEFAST NIHSS = 0 . impression syncope vs. TIA. pt not a TPA candidate; also had had a prior subdural hematoma in past. plan: BGM, EKG, CT head, labs including troponin, coags, dysphagia screen, monitor, observe, reassess and tele admission.

## 2022-04-22 NOTE — ED PROVIDER NOTE - ATTENDING APP SHARED VISIT CONTRIBUTION OF CARE
MARY Mak MD, ED Attending physician:  This was a shared visit with BAUTISTA.  I reviewed and verified the documentation and independently performed the documented history/exam/mdm.

## 2022-04-22 NOTE — ED PROVIDER NOTE - MUSCULOSKELETAL, MLM
Spine appears normal, range of motion is not limited, no muscle or joint tenderness, CABRERA x 4, no focal swelling tenderness
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
## 2022-04-22 NOTE — ED PROVIDER NOTE - NS ED ATTENDING STATEMENT MOD
This was a shared visit with the BAUTISTA. I reviewed and verified the documentation and independently performed the documented:

## 2022-04-22 NOTE — ED PROVIDER NOTE - NSICDXPASTSURGICALHX_GEN_ALL_CORE_FT
PAST SURGICAL HISTORY:  Bilateral cataracts     H/O brain surgery subdural  bleed,  had  chris holes    H/O hand surgery     History of cholecystectomy     History of tonsillectomy
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
## 2022-04-22 NOTE — PHARMACOTHERAPY INTERVENTION NOTE - COMMENTS
Medication history complete, reviewed medication with patient and list provided by patient and confirmed with Paulino.  Patient is on a 10 day course of Valtrex and normally takes 12.5mg of Methotrexate on Friday but patient was told to hold the Methotrexate while on Valtrex- does not know if she took medication this morning.

## 2022-04-22 NOTE — ED ADULT NURSE NOTE - NSIMPLEMENTINTERV_GEN_ALL_ED
Implemented All Fall with Harm Risk Interventions:  [place] to call system. Call bell, personal items and telephone within reach. Instruct patient to call for assistance. Room bathroom lighting operational. Non-slip footwear when patient is off stretcher. Physically safe environment: no spills, clutter or unnecessary equipment. Stretcher in lowest position, wheels locked, appropriate side rails in place. Provide visual cue, wrist band, yellow gown, etc. Monitor gait and stability. Monitor for mental status changes and reorient to person, place, and time. Review medications for side effects contributing to fall risk. Reinforce activity limits and safety measures with patient and family. Provide visual clues: red socks.

## 2022-04-22 NOTE — ED ADULT TRIAGE NOTE - CHIEF COMPLAINT QUOTE
Pt BIBEMS from PMD s/p syncopal episode. pt reports "I feel fine I went to my PMD for sores in my mouth and then the next thing I know they told me I have to go to the ER I must have passed out or something". BEFAST negative, GCS 15, no complaints at this time. BGM 87. Dr. Mak evaluated pt no code stroke initiated in EMS triage.

## 2022-04-22 NOTE — ED PROVIDER NOTE - NSICDXPASTMEDICALHX_GEN_ALL_CORE_FT
PAST MEDICAL HISTORY:  Actinic keratosis     Aortic valve stenosis     Back pain     Dehydration     Dry eyes     Fatigue     GERD (gastroesophageal reflux disease)     Hard of hearing     HLD (hyperlipidemia)     HTN (hypertension)     Hypothyroid     Intraventricular block     MR (mitral regurgitation)     Nocturia     Osteoarthritis of back     Subdural hematoma     Syncope and collapse     Urine incontinence

## 2022-04-22 NOTE — H&P ADULT - NSHPLABSRESULTS_GEN_ALL_CORE
11.7   4.78  )-----------( 31       ( 22 Apr 2022 13:14 )             35.1   04-22    138  |  107  |  20  ----------------------------<  96  4.1   |  23  |  1.00    Ca    9.0      22 Apr 2022 13:14    TPro  7.5  /  Alb  3.6  /  TBili  1.1  /  DBili  x   /  AST  30  /  ALT  31  /  AlkPhos  94  04-22      · EKG Date/Time: 22-Apr-2022 13:06  · Rate: 84  · Interpretation: normal sinus rhythm  · Axis: Left Deviation  · WI: 1st degree AVB  · QRS: LAFB, RBBB  · ST/T Wave: normal  · Other Findings: sinus arrhythmia

## 2022-04-22 NOTE — ED ADULT NURSE NOTE - CHIEF COMPLAINT QUOTE
Pt BIBEMS from PMD s/p syncopal episode. pt reports "I feel fine I went to my PMD for sores in my mouth and then the next thing I know they told me I have to go to the ER I must have passed out or something". BEFAST negative, GCS 15, no complaints at this time. BGM 87. Dr. Mak evaluated pt no code stroke initiated in EMS triage.
Severe sepsis

## 2022-04-22 NOTE — ED PROVIDER NOTE - PROGRESS NOTE DETAILS
C MD Obdulio:  Pt alert, normal speech, BEFAST = 0.  Pt NOT a COde Stroke candidate, warrants Ct head noncon. 89 y/o Female with PMHx of HTN, HLD, TAVR, Pacemaker for Heart Block, Hypothyroidism presents to ED c/o waking up not feeling well this morning.  Pt reports recent pain to mouth due to irritation.  Saw Dentist a few days ago and was started on Valtrex.  Took 3 doses and had little diarrhea last night.  Went to PMD for scheduled visit.  After eval, pt was sitting in chair, when her daughter and Dr. Flores noted that her head slumped forward, dentures fell out, and she was drooling.  Staring forward and unresponsive for few minutes, (? 4 mins) per daughter.  Pt slowly came to with some garbled speech.  Then pt returned to baseline, talkative, responsive prior to EMS arriving.  Pt A&O x3 in ED.  PERRLA, EOMs intact.  Oropharynx erythematous with erythematous lesions to buccal mucosa bilat with irritation, ulceration.  CTA B. RRR with II/VI murmur.  Moving all ext. Neg neuro deficits.  Neg facial droop, neg slurred speech.  Neg finger to nose, Neg heel to shin.  Sens intact.  NIH stroke scale 0.  Discussed case with EP and Neuro (Dr. Tucker) for consults. Pt will be admitted for Syncope / TIA.  Arianne Kong PA-C

## 2022-04-22 NOTE — PATIENT PROFILE ADULT - FALL HARM RISK - HARM RISK INTERVENTIONS

## 2022-04-23 DIAGNOSIS — R55 SYNCOPE AND COLLAPSE: ICD-10-CM

## 2022-04-23 LAB
ADD ON TEST-SPECIMEN IN LAB: SIGNIFICANT CHANGE UP
ANION GAP SERPL CALC-SCNC: 7 MMOL/L — SIGNIFICANT CHANGE UP (ref 5–17)
BASOPHILS # BLD AUTO: 0.01 K/UL — SIGNIFICANT CHANGE UP (ref 0–0.2)
BASOPHILS # BLD AUTO: 0.01 K/UL — SIGNIFICANT CHANGE UP (ref 0–0.2)
BASOPHILS NFR BLD AUTO: 0.2 % — SIGNIFICANT CHANGE UP (ref 0–2)
BASOPHILS NFR BLD AUTO: 0.2 % — SIGNIFICANT CHANGE UP (ref 0–2)
BUN SERPL-MCNC: 19 MG/DL — SIGNIFICANT CHANGE UP (ref 7–23)
CALCIUM SERPL-MCNC: 9 MG/DL — SIGNIFICANT CHANGE UP (ref 8.5–10.1)
CHLORIDE SERPL-SCNC: 105 MMOL/L — SIGNIFICANT CHANGE UP (ref 96–108)
CO2 SERPL-SCNC: 22 MMOL/L — SIGNIFICANT CHANGE UP (ref 22–31)
CREAT SERPL-MCNC: 0.88 MG/DL — SIGNIFICANT CHANGE UP (ref 0.5–1.3)
EGFR: 62 ML/MIN/1.73M2 — SIGNIFICANT CHANGE UP
EOSINOPHIL # BLD AUTO: 0.05 K/UL — SIGNIFICANT CHANGE UP (ref 0–0.5)
EOSINOPHIL # BLD AUTO: 0.08 K/UL — SIGNIFICANT CHANGE UP (ref 0–0.5)
EOSINOPHIL NFR BLD AUTO: 1.2 % — SIGNIFICANT CHANGE UP (ref 0–6)
EOSINOPHIL NFR BLD AUTO: 1.4 % — SIGNIFICANT CHANGE UP (ref 0–6)
GLUCOSE SERPL-MCNC: 104 MG/DL — HIGH (ref 70–99)
HCT VFR BLD CALC: 29.5 % — LOW (ref 34.5–45)
HCT VFR BLD CALC: 32 % — LOW (ref 34.5–45)
HGB BLD-MCNC: 10.7 G/DL — LOW (ref 11.5–15.5)
HGB BLD-MCNC: 9.8 G/DL — LOW (ref 11.5–15.5)
IMM GRANULOCYTES NFR BLD AUTO: 0.7 % — SIGNIFICANT CHANGE UP (ref 0–1.5)
IMM GRANULOCYTES NFR BLD AUTO: 0.7 % — SIGNIFICANT CHANGE UP (ref 0–1.5)
LYMPHOCYTES # BLD AUTO: 0.6 K/UL — LOW (ref 1–3.3)
LYMPHOCYTES # BLD AUTO: 0.64 K/UL — LOW (ref 1–3.3)
LYMPHOCYTES # BLD AUTO: 10.8 % — LOW (ref 13–44)
LYMPHOCYTES # BLD AUTO: 15.7 % — SIGNIFICANT CHANGE UP (ref 13–44)
MAGNESIUM SERPL-MCNC: 2 MG/DL — SIGNIFICANT CHANGE UP (ref 1.6–2.6)
MCHC RBC-ENTMCNC: 30.4 PG — SIGNIFICANT CHANGE UP (ref 27–34)
MCHC RBC-ENTMCNC: 30.5 PG — SIGNIFICANT CHANGE UP (ref 27–34)
MCHC RBC-ENTMCNC: 33.2 GM/DL — SIGNIFICANT CHANGE UP (ref 32–36)
MCHC RBC-ENTMCNC: 33.4 GM/DL — SIGNIFICANT CHANGE UP (ref 32–36)
MCV RBC AUTO: 90.9 FL — SIGNIFICANT CHANGE UP (ref 80–100)
MCV RBC AUTO: 91.9 FL — SIGNIFICANT CHANGE UP (ref 80–100)
MONOCYTES # BLD AUTO: 0.06 K/UL — SIGNIFICANT CHANGE UP (ref 0–0.9)
MONOCYTES # BLD AUTO: 0.06 K/UL — SIGNIFICANT CHANGE UP (ref 0–0.9)
MONOCYTES NFR BLD AUTO: 1.1 % — LOW (ref 2–14)
MONOCYTES NFR BLD AUTO: 1.5 % — LOW (ref 2–14)
NEUTROPHILS # BLD AUTO: 3.28 K/UL — SIGNIFICANT CHANGE UP (ref 1.8–7.4)
NEUTROPHILS # BLD AUTO: 4.75 K/UL — SIGNIFICANT CHANGE UP (ref 1.8–7.4)
NEUTROPHILS NFR BLD AUTO: 80.7 % — HIGH (ref 43–77)
NEUTROPHILS NFR BLD AUTO: 85.8 % — HIGH (ref 43–77)
PHOSPHATE SERPL-MCNC: 1.8 MG/DL — LOW (ref 2.5–4.5)
PLATELET # BLD AUTO: 16 K/UL — CRITICAL LOW (ref 150–400)
PLATELET # BLD AUTO: 19 K/UL — CRITICAL LOW (ref 150–400)
POTASSIUM SERPL-MCNC: 4.3 MMOL/L — SIGNIFICANT CHANGE UP (ref 3.5–5.3)
POTASSIUM SERPL-SCNC: 4.3 MMOL/L — SIGNIFICANT CHANGE UP (ref 3.5–5.3)
RBC # BLD: 3.21 M/UL — LOW (ref 3.8–5.2)
RBC # BLD: 3.52 M/UL — LOW (ref 3.8–5.2)
RBC # FLD: 14.3 % — SIGNIFICANT CHANGE UP (ref 10.3–14.5)
RBC # FLD: 14.3 % — SIGNIFICANT CHANGE UP (ref 10.3–14.5)
SODIUM SERPL-SCNC: 134 MMOL/L — LOW (ref 135–145)
TSH SERPL-MCNC: 1.59 UU/ML — SIGNIFICANT CHANGE UP (ref 0.34–4.82)
WBC # BLD: 4.07 K/UL — SIGNIFICANT CHANGE UP (ref 3.8–10.5)
WBC # BLD: 5.54 K/UL — SIGNIFICANT CHANGE UP (ref 3.8–10.5)
WBC # FLD AUTO: 4.07 K/UL — SIGNIFICANT CHANGE UP (ref 3.8–10.5)
WBC # FLD AUTO: 5.54 K/UL — SIGNIFICANT CHANGE UP (ref 3.8–10.5)

## 2022-04-23 PROCEDURE — 99232 SBSQ HOSP IP/OBS MODERATE 35: CPT

## 2022-04-23 PROCEDURE — 95816 EEG AWAKE AND DROWSY: CPT | Mod: 26

## 2022-04-23 RX ORDER — POTASSIUM PHOSPHATE, MONOBASIC POTASSIUM PHOSPHATE, DIBASIC 236; 224 MG/ML; MG/ML
15 INJECTION, SOLUTION INTRAVENOUS ONCE
Refills: 0 | Status: COMPLETED | OUTPATIENT
Start: 2022-04-23 | End: 2022-04-23

## 2022-04-23 RX ORDER — MUPIROCIN 20 MG/G
1 OINTMENT TOPICAL EVERY 12 HOURS
Refills: 0 | Status: DISCONTINUED | OUTPATIENT
Start: 2022-04-23 | End: 2022-04-27

## 2022-04-23 RX ORDER — MUPIROCIN 20 MG/G
1 OINTMENT TOPICAL EVERY 12 HOURS
Refills: 0 | Status: DISCONTINUED | OUTPATIENT
Start: 2022-04-23 | End: 2022-04-23

## 2022-04-23 RX ORDER — DIPHENHYDRAMINE HYDROCHLORIDE AND LIDOCAINE HYDROCHLORIDE AND ALUMINUM HYDROXIDE AND MAGNESIUM HYDRO
10 KIT
Refills: 0 | Status: DISCONTINUED | OUTPATIENT
Start: 2022-04-23 | End: 2022-04-27

## 2022-04-23 RX ORDER — NYSTATIN 500MM UNIT
500000 POWDER (EA) MISCELLANEOUS
Refills: 0 | Status: DISCONTINUED | OUTPATIENT
Start: 2022-04-23 | End: 2022-04-27

## 2022-04-23 RX ADMIN — Medication 25 MILLIGRAM(S): at 05:59

## 2022-04-23 RX ADMIN — Medication 100 MICROGRAM(S): at 05:59

## 2022-04-23 RX ADMIN — Medication 500000 UNIT(S): at 22:14

## 2022-04-23 RX ADMIN — DIPHENHYDRAMINE HYDROCHLORIDE AND LIDOCAINE HYDROCHLORIDE AND ALUMINUM HYDROXIDE AND MAGNESIUM HYDRO 10 MILLILITER(S): KIT at 09:54

## 2022-04-23 RX ADMIN — Medication 81 MILLIGRAM(S): at 09:18

## 2022-04-23 RX ADMIN — PANTOPRAZOLE SODIUM 40 MILLIGRAM(S): 20 TABLET, DELAYED RELEASE ORAL at 06:00

## 2022-04-23 RX ADMIN — DIPHENHYDRAMINE HYDROCHLORIDE AND LIDOCAINE HYDROCHLORIDE AND ALUMINUM HYDROXIDE AND MAGNESIUM HYDRO 10 MILLILITER(S): KIT at 13:13

## 2022-04-23 RX ADMIN — PREGABALIN 1000 MICROGRAM(S): 225 CAPSULE ORAL at 09:18

## 2022-04-23 RX ADMIN — Medication 650 MILLIGRAM(S): at 16:47

## 2022-04-23 RX ADMIN — VALACYCLOVIR 500 MILLIGRAM(S): 500 TABLET, FILM COATED ORAL at 21:30

## 2022-04-23 RX ADMIN — AMLODIPINE BESYLATE 5 MILLIGRAM(S): 2.5 TABLET ORAL at 05:59

## 2022-04-23 RX ADMIN — Medication 650 MILLIGRAM(S): at 09:20

## 2022-04-23 RX ADMIN — POTASSIUM PHOSPHATE, MONOBASIC POTASSIUM PHOSPHATE, DIBASIC 62.5 MILLIMOLE(S): 236; 224 INJECTION, SOLUTION INTRAVENOUS at 12:54

## 2022-04-23 RX ADMIN — Medication 12.5 MILLIGRAM(S): at 05:59

## 2022-04-23 RX ADMIN — Medication 500000 UNIT(S): at 17:38

## 2022-04-23 RX ADMIN — Medication 650 MILLIGRAM(S): at 22:13

## 2022-04-23 RX ADMIN — Medication 1 MILLIGRAM(S): at 09:18

## 2022-04-23 RX ADMIN — Medication 650 MILLIGRAM(S): at 17:40

## 2022-04-23 RX ADMIN — MUPIROCIN 1 APPLICATION(S): 20 OINTMENT TOPICAL at 21:30

## 2022-04-23 RX ADMIN — Medication 500000 UNIT(S): at 12:54

## 2022-04-23 RX ADMIN — Medication 650 MILLIGRAM(S): at 10:15

## 2022-04-23 NOTE — EEG REPORT - NS EEG TEXT BOX
REPORT OF ROUTINE EEG WITH VIDEO  [facility]: [address], [address] [place] [address], Phone: [phone] [facility]: [phone] [place] [address], [place] [address], Phone: [phone] Office: [address] [place] [address], [place] [address], [place] [address], Phone: [phone]  Patient Name: CELINA HENAO   Age: 89 year : 10/14/1931 MRN #: [id number], Location:  Referring Physician: LISA  EEG #:  Study Date: 2022   Start Time: 1:11:24 PM    Study Duration: 20.7 		  Technical Information:					 On Instrument: - Placement and Labeling of Electrodes: The EEG was performed utilizing 20 channels referential EEG connections (coronal over temporal over parasagittal montage) using all standard 10-20 electrode placements with EKG.  Recording was at a sampling rate of 256 samples per second per channel.  Time synchronized digital video recording was done simultaneously with EEG recording.  A low light infrared camera was used for low light recording.  Parag and seizure detection algorithms were utilized. CSA Technical Component: Quantitative EEG analysis using a separate Compressed Spectral Array (CSA) software package was conducted in real-time and run at bedside after set up by the technician, digitally displaying the power of electrographic frequencies included in the 1-30Hz band using a graded color map.  This data was reviewed and interpreted independently, and is reported in a separate section below.  History: This is a RT  study performed in the bedside. Patient is a 89 years old female with history of: episode of unresponsivness, drooling. State of the Patient: awake, alert, answer all questions correctly, follows commands, relax,   Medication VALACYCLOVIR LEVOTHYROXINE METOPROLOL AMLODIPINE  Study Interpretation:  The record is technically difficult to interpret due to frequent motion artifact.   FINDINGS:  The background was continuous, spontaneously variable and reactive.  During wakefulness, the posteriorly dominant rhythm consisted of symmetric, well modulated 8 Hz activity, with an amplitude to 30 uV.  Low amplitude central beta was noted in wakefulness.  Background Slowing: Generalized slowing: none was present. Focal slowing: there was equivocal intermittent focal polymorphic delta slowing in the left temporal region, limited by frequent movement artifact.   Sleep Background: Drowsiness was characterized by fragmentation, attenuation, and slowing of the background activity.   Stage II sleep transients were not recorded.  Non-epileptiform activity: None.  Epileptiform Activity:  No epileptiform discharges were present.  Events: No clinical events were recorded. No seizures were recorded.  Activation Procedures:  -Hyperventilation was not performed.   -Photic stimulation was not performed.  Artifacts: Intermittent myogenic and movement artifacts were noted.  ECG: The heart rate on single channel ECG was predominantly between 70 BPM.  EEG Classification / Summary:  Possibly abnormal EEG in the awake and drowsy limited by frequent motion artifact.  There may be intermittent focal slowing in left temporal region.   Clinical Impression:  Technically difficult study due to frequent motion artifact. Possible functional abnormality in the left temporal region.  No epileptiform pattern or seizure seen.  ________________________________________    Valentin Thompson MD, PhD Director, Epilepsy Division, [facility]  ------------------------------------ EEG Reading Room: [phone] On Call Service After Hours: [phone]

## 2022-04-23 NOTE — PROGRESS NOTE ADULT - SUBJECTIVE AND OBJECTIVE BOX
CHIEF COMPLAINT: Syncope/Mouth Pain    SUBJECTIVE: Mouth Pain unchanged. Difficult to eat. Denies fever, chills, dizziness, chest pain, SOB, nausea, vomiting    SIGNIFICANT INTERVAL EVENTS/OVERNIGHT EVENTS: None    Review of Systems: 14 Point review of systems reviewed and reported as negative unless otherwise stated in HPI    FROM H&P:  "89 F hx osteoarthritis, mitral regurgitation, hypothyroid, syncope, GERD, RA  HTN, HLD, aortic valve stenosis here s/p syncopal episode. pt woke up today feeling generally unwell and has been having severe pain in her mouth due to oral lesions. pt went to see dentist 2 days ago and was started on Valtrex for possible viral infection. pt was following up with Dr. Flores today and after examination was sitting on a chair when daughter noticed her head went forward. pt's top denture  fell out and she was drooling and pt was unresponsive for a few minutes and then pt seemed to wake up. pt with some difficulty with speech at first but that has self-resolved. pt taken by ambulance to the ER for further evaluation. pt denies fever, chills, cough, shortness of breath, chest pain prior to episode of present in the ER. pt has a [employer] PPM. also of note, pt in 2015 pt s/p fall and had a brain bleed. Pt back to baseline."    PHYSICAL EXAM:    T(C): 38.4 (04-23-22 @ 07:40), Max: 38.4 (04-23-22 @ 07:40)  HR: 87 (04-23-22 @ 07:40) (87 - 103)  BP: 136/69 (04-23-22 @ 07:40) (107/44 - 199/92)  RR: 18 (04-23-22 @ 07:40) (18 - 18)  SpO2: 95% (04-23-22 @ 07:40) (95% - 100%)    General: AAOx3; NAD  Head: AT/NC  ENT: Moist Mucous Membranes; Yellow tongue surface; Multiple painful white plaques on oral cavity mucous membranes; Dentures Pharynx clear.   Eyes: EOMI; PERRL  Neck: Non-tender; No JVD  CVS: RRR, S1&S2, No murmur, No edema  Respiratory: Lungs CTA B/L; Normal Respiratory Effort  Abdomen/GI: Soft, non-tender, non-distended, no guarding, no rebound, normal bowel sounds  : No bladder distention, No [place]  Extremities: No cyanosis, No clubbing, No edema  MSK: No CVA tenderness, Normal ROM, No injury  Neuro: AAOx3, CNII-XII grossly intact, non-focal  Psych: Appropriate, Cooperative,  Skin: Clean, Dry and Intact      LABS:                          10.7   4.07  )-----------( 19       ( 23 Apr 2022 08:10 )             32.0     04-23    134<L>  |  105  |  19  ----------------------------<  104<H>  4.3   |  22  |  0.88    Ca    9.0      23 Apr 2022 08:10  Phos  1.8     04-23  Mg     2.0     04-23    TPro  7.5  /  Alb  3.6  /  TBili  1.1  /  DBili  x   /  AST  30  /  ALT  31  /  AlkPhos  94  04-22    COVID-19 PCR: NotDetec (22 Apr 2022 12:56)    CAPILLARY BLOOD GLUCOSE      POCT Blood Glucose.: 87 mg/dL (22 Apr 2022 12:56)    Thyroid Stimulating Hormone, Serum: 1.59 uU/mL (04.23.22 @ 08:10) Phosphorus Level, Serum: 1.8 mg/dL (04.23.22 @ 08:10) Troponin I, High Sensitivity Result: 20.34: Lactate, Blood: 0.8 mmol/L (04.22.22 @ 17:01) Urine Microscopic-Add On (NC) (04.22.22 @ 15:27)   Epithelial Cells: Few   Comment - Urine: few amorph sed. occ mucus   Bacteria: Negative   Red Blood Cell - Urine: 0-2 /HPF   White Blood Cell - Urine: 11-25 Urinalysis (04.22.22 @ 15:27)   pH Urine: 5.0   Blood, Urine: Moderate   Glucose Qualitative, Urine: Negative   Color: Lindsey   Urine Appearance: Clear   Bilirubin: Negative   Ketone - Urine: Small   Specific Gravity: 1.020   Protein, Urine: 15   Urobilinogen: 1   Nitrite: Negative   Leukocyte Esterase Concentration: Moderate Troponin I, High Sensitivity Result: 13.10:       RADIOLOGY:  < from: CT Head No Cont (04.22.22 @ 13:56) >  IMPRESSION: Previously noted right frontal subdural hematoma is no longer  seen.    < end of copied text       ECHO:  Pending        I personally reviewed labs, imaging, ekg, orders and vitals.    Discussed case with:  [X]RN  [X]CM/[place]  [X]Patient  []Family  []Specialist:        MEDICATIONS  (STANDING):  amLODIPine   Tablet 5 milliGRAM(s) Oral daily  aspirin  chewable 81 milliGRAM(s) Oral daily  cyanocobalamin 1000 MICROGram(s) Oral daily  folic acid 1 milliGRAM(s) Oral daily  hydrochlorothiazide 12.5 milliGRAM(s) Oral daily  levothyroxine 100 MICROGram(s) Oral daily  metoprolol succinate ER 25 milliGRAM(s) Oral daily  nystatin    Suspension [id number] Unit(s) Swish and Swallow four times a day  pantoprazole    Tablet 40 milliGRAM(s) Oral before breakfast  potassium phosphate IVPB 15 milliMole(s) IV Intermittent once  valACYclovir 500 milliGRAM(s) Oral two times a day    MEDICATIONS  (PRN):  acetaminophen     Tablet .. 650 milliGRAM(s) Oral every 6 hours PRN Temp greater or equal to 38C (100.4F), Mild Pain (1 - 3)  aluminum hydroxide/magnesium hydroxide/simethicone Suspension 30 milliLiter(s) Oral every 4 hours PRN Dyspepsia  FIRST- Mouthwash  BLM 10 milliLiter(s) Swish and Spit four times a day PRN Mouth Care  melatonin 3 milliGRAM(s) Oral at bedtime PRN Insomnia  ondansetron Injectable 4 milliGRAM(s) IV Push every 8 hours PRN Nausea and/or Vomiting     CHIEF COMPLAINT: Syncope/Mouth Pain    SUBJECTIVE: Mouth Pain unchanged. Difficult to eat. Denies fever, chills, dizziness, chest pain, SOB, nausea, vomiting    SIGNIFICANT INTERVAL EVENTS/OVERNIGHT EVENTS: None    Review of Systems: 14 Point review of systems reviewed and reported as negative unless otherwise stated in HPI    FROM H&P:  "89 F hx osteoarthritis, mitral regurgitation, hypothyroid, syncope, GERD, RA  HTN, HLD, aortic valve stenosis here s/p syncopal episode. pt woke up today feeling generally unwell and has been having severe pain in her mouth due to oral lesions. pt went to see dentist 2 days ago and was started on Valtrex for possible viral infection. pt was following up with Dr. Flores today and after examination was sitting on a chair when daughter noticed her head went forward. pt's top denture  fell out and she was drooling and pt was unresponsive for a few minutes and then pt seemed to wake up. pt with some difficulty with speech at first but that has self-resolved. pt taken by ambulance to the ER for further evaluation. pt denies fever, chills, cough, shortness of breath, chest pain prior to episode of present in the ER. pt has a [employer] PPM. also of note, pt in 2015 pt s/p fall and had a brain bleed. Pt back to baseline."    PHYSICAL EXAM:    T(C): 38.4 (04-23-22 @ 07:40), Max: 38.4 (04-23-22 @ 07:40)  HR: 87 (04-23-22 @ 07:40) (87 - 103)  BP: 136/69 (04-23-22 @ 07:40) (107/44 - 199/92)  RR: 18 (04-23-22 @ 07:40) (18 - 18)  SpO2: 95% (04-23-22 @ 07:40) (95% - 100%)    General: AAOx3; NAD  Head: AT/NC  ENT: Moist Mucous Membranes; Yellow tongue surface; Multiple painful white plaques on oral cavity mucous membranes; Dentures Pharynx clear.   Eyes: EOMI; PERRL  Neck: Non-tender; No JVD  CVS: RRR, S1&S2, No murmur, No edema  Respiratory: Lungs CTA B/L; Normal Respiratory Effort  Abdomen/GI: Soft, non-tender, non-distended, no guarding, no rebound, normal bowel sounds  : No bladder distention, No [place]  Extremities: No cyanosis, No clubbing, No edema  MSK: No CVA tenderness, Normal ROM, No injury  Neuro: AAOx3, CNII-XII grossly intact, non-focal  Psych: Appropriate, Cooperative,  Skin: Clean, Dry and Intact; Petechiae      LABS:                          10.7   4.07  )-----------( 19       ( 23 Apr 2022 08:10 )             32.0     04-23    134<L>  |  105  |  19  ----------------------------<  104<H>  4.3   |  22  |  0.88    Ca    9.0      23 Apr 2022 08:10  Phos  1.8     04-23  Mg     2.0     04-23    TPro  7.5  /  Alb  3.6  /  TBili  1.1  /  DBili  x   /  AST  30  /  ALT  31  /  AlkPhos  94  04-22    COVID-19 PCR: NotDetec (22 Apr 2022 12:56)    CAPILLARY BLOOD GLUCOSE      POCT Blood Glucose.: 87 mg/dL (22 Apr 2022 12:56)    Thyroid Stimulating Hormone, Serum: 1.59 uU/mL (04.23.22 @ 08:10) Phosphorus Level, Serum: 1.8 mg/dL (04.23.22 @ 08:10) Troponin I, High Sensitivity Result: 20.34: Lactate, Blood: 0.8 mmol/L (04.22.22 @ 17:01) Urine Microscopic-Add On (NC) (04.22.22 @ 15:27)   Epithelial Cells: Few   Comment - Urine: few amorph sed. occ mucus   Bacteria: Negative   Red Blood Cell - Urine: 0-2 /HPF   White Blood Cell - Urine: 11-25 Urinalysis (04.22.22 @ 15:27)   pH Urine: 5.0   Blood, Urine: Moderate   Glucose Qualitative, Urine: Negative   Color: Lindsey   Urine Appearance: Clear   Bilirubin: Negative   Ketone - Urine: Small   Specific Gravity: 1.020   Protein, Urine: 15   Urobilinogen: 1   Nitrite: Negative   Leukocyte Esterase Concentration: Moderate Troponin I, High Sensitivity Result: 13.10:       RADIOLOGY:  < from: CT Head No Cont (04.22.22 @ 13:56) >  IMPRESSION: Previously noted right frontal subdural hematoma is no longer  seen.    < end of copied text       ECHO:  Pending        I personally reviewed labs, imaging, ekg, orders and vitals.    Discussed case with:  [X]RN  [X]SARAH/[place]  [X]Patient  []Family  []Specialist:        MEDICATIONS  (STANDING):  amLODIPine   Tablet 5 milliGRAM(s) Oral daily  aspirin  chewable 81 milliGRAM(s) Oral daily  cyanocobalamin 1000 MICROGram(s) Oral daily  folic acid 1 milliGRAM(s) Oral daily  hydrochlorothiazide 12.5 milliGRAM(s) Oral daily  levothyroxine 100 MICROGram(s) Oral daily  metoprolol succinate ER 25 milliGRAM(s) Oral daily  nystatin    Suspension [id number] Unit(s) Swish and Swallow four times a day  pantoprazole    Tablet 40 milliGRAM(s) Oral before breakfast  potassium phosphate IVPB 15 milliMole(s) IV Intermittent once  valACYclovir 500 milliGRAM(s) Oral two times a day    MEDICATIONS  (PRN):  acetaminophen     Tablet .. 650 milliGRAM(s) Oral every 6 hours PRN Temp greater or equal to 38C (100.4F), Mild Pain (1 - 3)  aluminum hydroxide/magnesium hydroxide/simethicone Suspension 30 milliLiter(s) Oral every 4 hours PRN Dyspepsia  FIRST- Mouthwash  BLM 10 milliLiter(s) Swish and Spit four times a day PRN Mouth Care  melatonin 3 milliGRAM(s) Oral at bedtime PRN Insomnia  ondansetron Injectable 4 milliGRAM(s) IV Push every 8 hours PRN Nausea and/or Vomiting     CHIEF COMPLAINT: Syncope/Mouth Pain    SUBJECTIVE: Mouth Pain unchanged. Difficult to eat. Denies fever, chills, dizziness, chest pain, SOB, nausea, vomiting    SIGNIFICANT INTERVAL EVENTS/OVERNIGHT EVENTS:   Fever morning of 4/23: no tachycardia; Patient herself denies chills, dizzines, weakness, or sensation of fever at the time of measurement. Continue to monitor.     Review of Systems: 14 Point review of systems reviewed and reported as negative unless otherwise stated in HPI    FROM H&P:  "89 F hx osteoarthritis, mitral regurgitation, hypothyroid, syncope, GERD, RA  HTN, HLD, aortic valve stenosis here s/p syncopal episode. pt woke up today feeling generally unwell and has been having severe pain in her mouth due to oral lesions. pt went to see dentist 2 days ago and was started on Valtrex for possible viral infection. pt was following up with Dr. Flores today and after examination was sitting on a chair when daughter noticed her head went forward. pt's top denture  fell out and she was drooling and pt was unresponsive for a few minutes and then pt seemed to wake up. pt with some difficulty with speech at first but that has self-resolved. pt taken by ambulance to the ER for further evaluation. pt denies fever, chills, cough, shortness of breath, chest pain prior to episode of present in the ER. pt has a [employer] PPM. also of note, pt in 2015 pt s/p fall and had a brain bleed. Pt back to baseline."    PHYSICAL EXAM:    T(C): 38.4 (04-23-22 @ 07:40), Max: 38.4 (04-23-22 @ 07:40)  HR: 87 (04-23-22 @ 07:40) (87 - 103)  BP: 136/69 (04-23-22 @ 07:40) (107/44 - 199/92)  RR: 18 (04-23-22 @ 07:40) (18 - 18)  SpO2: 95% (04-23-22 @ 07:40) (95% - 100%)    General: AAOx3; NAD  Head: AT/NC  ENT: Moist Mucous Membranes; Yellow tongue surface; Multiple painful white plaques on oral cavity mucous membranes; Dentures Pharynx clear.   Eyes: EOMI; PERRL  Neck: Non-tender; No JVD  CVS: RRR, S1&S2, No murmur, No edema  Respiratory: Lungs CTA B/L; Normal Respiratory Effort  Abdomen/GI: Soft, non-tender, non-distended, no guarding, no rebound, normal bowel sounds  : No bladder distention, No [place]  Extremities: No cyanosis, No clubbing, No edema  MSK: No CVA tenderness, Normal ROM, No injury  Neuro: AAOx3, CNII-XII grossly intact, non-focal  Psych: Appropriate, Cooperative,  Skin: Clean, Dry and Intact; Petechiae      LABS:                          10.7   4.07  )-----------( 19       ( 23 Apr 2022 08:10 )             32.0     04-23    134<L>  |  105  |  19  ----------------------------<  104<H>  4.3   |  22  |  0.88    Ca    9.0      23 Apr 2022 08:10  Phos  1.8     04-23  Mg     2.0     04-23    TPro  7.5  /  Alb  3.6  /  TBili  1.1  /  DBili  x   /  AST  30  /  ALT  31  /  AlkPhos  94  04-22    COVID-19 PCR: NotDetec (22 Apr 2022 12:56)    CAPILLARY BLOOD GLUCOSE      POCT Blood Glucose.: 87 mg/dL (22 Apr 2022 12:56)    Thyroid Stimulating Hormone, Serum: 1.59 uU/mL (04.23.22 @ 08:10) Phosphorus Level, Serum: 1.8 mg/dL (04.23.22 @ 08:10) Troponin I, High Sensitivity Result: 20.34: Lactate, Blood: 0.8 mmol/L (04.22.22 @ 17:01) Urine Microscopic-Add On (NC) (04.22.22 @ 15:27)   Epithelial Cells: Few   Comment - Urine: few amorph sed. occ mucus   Bacteria: Negative   Red Blood Cell - Urine: 0-2 /HPF   White Blood Cell - Urine: 11-25 Urinalysis (04.22.22 @ 15:27)   pH Urine: 5.0   Blood, Urine: Moderate   Glucose Qualitative, Urine: Negative   Color: Lindsey   Urine Appearance: Clear   Bilirubin: Negative   Ketone - Urine: Small   Specific Gravity: 1.020   Protein, Urine: 15   Urobilinogen: 1   Nitrite: Negative   Leukocyte Esterase Concentration: Moderate Troponin I, High Sensitivity Result: 13.10:       RADIOLOGY:  < from: CT Head No Cont (04.22.22 @ 13:56) >  IMPRESSION: Previously noted right frontal subdural hematoma is no longer  seen.    < end of copied text       ECHO:  Pending        I personally reviewed labs, imaging, ekg, orders and vitals.    Discussed case with:  [X]RN  [X]CM/SW  [X]Patient  []Family  []Specialist:        MEDICATIONS  (STANDING):  amLODIPine   Tablet 5 milliGRAM(s) Oral daily  aspirin  chewable 81 milliGRAM(s) Oral daily  cyanocobalamin 1000 MICROGram(s) Oral daily  folic acid 1 milliGRAM(s) Oral daily  hydrochlorothiazide 12.5 milliGRAM(s) Oral daily  levothyroxine 100 MICROGram(s) Oral daily  metoprolol succinate ER 25 milliGRAM(s) Oral daily  nystatin    Suspension [id number] Unit(s) Swish and Swallow four times a day  pantoprazole    Tablet 40 milliGRAM(s) Oral before breakfast  potassium phosphate IVPB 15 milliMole(s) IV Intermittent once  valACYclovir 500 milliGRAM(s) Oral two times a day    MEDICATIONS  (PRN):  acetaminophen     Tablet .. 650 milliGRAM(s) Oral every 6 hours PRN Temp greater or equal to 38C (100.4F), Mild Pain (1 - 3)  aluminum hydroxide/magnesium hydroxide/simethicone Suspension 30 milliLiter(s) Oral every 4 hours PRN Dyspepsia  FIRST- Mouthwash  BLM 10 milliLiter(s) Swish and Spit four times a day PRN Mouth Care  melatonin 3 milliGRAM(s) Oral at bedtime PRN Insomnia  ondansetron Injectable 4 milliGRAM(s) IV Push every 8 hours PRN Nausea and/or Vomiting

## 2022-04-23 NOTE — PROGRESS NOTE ADULT - ASSESSMENT
ASSESSMENT/PLAN:    #Syncope/Near Syncope  -Telemetry. PPM interrogated->no acute events  -Order orthostatics  -Troponin negative  -no focal deficits  -CT head negative for acute process  -Now asymptomatic  -PT evaluation    #Thrombocytopenia  -New?  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

## 2022-04-24 LAB
ANION GAP SERPL CALC-SCNC: 7 MMOL/L — SIGNIFICANT CHANGE UP (ref 5–17)
BASOPHILS # BLD AUTO: 0 K/UL — SIGNIFICANT CHANGE UP (ref 0–0.2)
BASOPHILS NFR BLD AUTO: 0 % — SIGNIFICANT CHANGE UP (ref 0–2)
BLD GP AB SCN SERPL QL: SIGNIFICANT CHANGE UP
BUN SERPL-MCNC: 22 MG/DL — SIGNIFICANT CHANGE UP (ref 7–23)
CALCIUM SERPL-MCNC: 8.7 MG/DL — SIGNIFICANT CHANGE UP (ref 8.5–10.1)
CHLORIDE SERPL-SCNC: 105 MMOL/L — SIGNIFICANT CHANGE UP (ref 96–108)
CO2 SERPL-SCNC: 23 MMOL/L — SIGNIFICANT CHANGE UP (ref 22–31)
CREAT SERPL-MCNC: 0.82 MG/DL — SIGNIFICANT CHANGE UP (ref 0.5–1.3)
CULTURE RESULTS: SIGNIFICANT CHANGE UP
EGFR: 68 ML/MIN/1.73M2 — SIGNIFICANT CHANGE UP
EOSINOPHIL # BLD AUTO: 0.1 K/UL — SIGNIFICANT CHANGE UP (ref 0–0.5)
EOSINOPHIL NFR BLD AUTO: 2.1 % — SIGNIFICANT CHANGE UP (ref 0–6)
FERRITIN SERPL-MCNC: 491 NG/ML — HIGH (ref 15–150)
FOLATE SERPL-MCNC: >20 NG/ML — SIGNIFICANT CHANGE UP
GLUCOSE SERPL-MCNC: 98 MG/DL — SIGNIFICANT CHANGE UP (ref 70–99)
HCT VFR BLD CALC: 25.1 % — LOW (ref 34.5–45)
HCT VFR BLD CALC: 27.8 % — LOW (ref 34.5–45)
HGB BLD-MCNC: 8.4 G/DL — LOW (ref 11.5–15.5)
HGB BLD-MCNC: 9.4 G/DL — LOW (ref 11.5–15.5)
IMM GRANULOCYTES NFR BLD AUTO: 0.8 % — SIGNIFICANT CHANGE UP (ref 0–1.5)
IRON SATN MFR SERPL: 39 UG/DL — SIGNIFICANT CHANGE UP (ref 30–160)
LDH SERPL L TO P-CCNC: 182 U/L — SIGNIFICANT CHANGE UP (ref 84–241)
LYMPHOCYTES # BLD AUTO: 0.5 K/UL — LOW (ref 1–3.3)
LYMPHOCYTES # BLD AUTO: 10.6 % — LOW (ref 13–44)
MAGNESIUM SERPL-MCNC: 2.2 MG/DL — SIGNIFICANT CHANGE UP (ref 1.6–2.6)
MCHC RBC-ENTMCNC: 30.5 PG — SIGNIFICANT CHANGE UP (ref 27–34)
MCHC RBC-ENTMCNC: 30.5 PG — SIGNIFICANT CHANGE UP (ref 27–34)
MCHC RBC-ENTMCNC: 33.5 GM/DL — SIGNIFICANT CHANGE UP (ref 32–36)
MCHC RBC-ENTMCNC: 33.8 GM/DL — SIGNIFICANT CHANGE UP (ref 32–36)
MCV RBC AUTO: 90.3 FL — SIGNIFICANT CHANGE UP (ref 80–100)
MCV RBC AUTO: 91.3 FL — SIGNIFICANT CHANGE UP (ref 80–100)
MONOCYTES # BLD AUTO: 0.05 K/UL — SIGNIFICANT CHANGE UP (ref 0–0.9)
MONOCYTES NFR BLD AUTO: 1 % — LOW (ref 2–14)
NEUTROPHILS # BLD AUTO: 4.04 K/UL — SIGNIFICANT CHANGE UP (ref 1.8–7.4)
NEUTROPHILS NFR BLD AUTO: 85.4 % — HIGH (ref 43–77)
PHOSPHATE SERPL-MCNC: 2.2 MG/DL — LOW (ref 2.5–4.5)
PLATELET # BLD AUTO: 40 K/UL — LOW (ref 150–400)
PLATELET # BLD AUTO: 9 K/UL — CRITICAL LOW (ref 150–400)
POTASSIUM SERPL-MCNC: 3.9 MMOL/L — SIGNIFICANT CHANGE UP (ref 3.5–5.3)
POTASSIUM SERPL-SCNC: 3.9 MMOL/L — SIGNIFICANT CHANGE UP (ref 3.5–5.3)
RBC # BLD: 2.75 M/UL — LOW (ref 3.8–5.2)
RBC # BLD: 2.82 M/UL — LOW (ref 3.8–5.2)
RBC # BLD: 3.08 M/UL — LOW (ref 3.8–5.2)
RBC # FLD: 14.3 % — SIGNIFICANT CHANGE UP (ref 10.3–14.5)
RBC # FLD: 14.3 % — SIGNIFICANT CHANGE UP (ref 10.3–14.5)
RETICS #: 14.7 K/UL — LOW (ref 25–125)
RETICS/RBC NFR: 0.5 % — SIGNIFICANT CHANGE UP (ref 0.5–2.5)
SODIUM SERPL-SCNC: 135 MMOL/L — SIGNIFICANT CHANGE UP (ref 135–145)
SPECIMEN SOURCE: SIGNIFICANT CHANGE UP
VIT B12 SERPL-MCNC: 736 PG/ML — SIGNIFICANT CHANGE UP (ref 232–1245)
WBC # BLD: 4.73 K/UL — SIGNIFICANT CHANGE UP (ref 3.8–10.5)
WBC # BLD: 5.16 K/UL — SIGNIFICANT CHANGE UP (ref 3.8–10.5)
WBC # FLD AUTO: 4.73 K/UL — SIGNIFICANT CHANGE UP (ref 3.8–10.5)
WBC # FLD AUTO: 5.16 K/UL — SIGNIFICANT CHANGE UP (ref 3.8–10.5)

## 2022-04-24 PROCEDURE — 74177 CT ABD & PELVIS W/CONTRAST: CPT | Mod: 26

## 2022-04-24 PROCEDURE — 99233 SBSQ HOSP IP/OBS HIGH 50: CPT

## 2022-04-24 RX ORDER — SODIUM,POTASSIUM PHOSPHATES 278-250MG
1 POWDER IN PACKET (EA) ORAL
Refills: 0 | Status: COMPLETED | OUTPATIENT
Start: 2022-04-24 | End: 2022-04-26

## 2022-04-24 RX ORDER — CEFTRIAXONE 500 MG/1
1000 INJECTION, POWDER, FOR SOLUTION INTRAMUSCULAR; INTRAVENOUS EVERY 24 HOURS
Refills: 0 | Status: DISCONTINUED | OUTPATIENT
Start: 2022-04-24 | End: 2022-04-25

## 2022-04-24 RX ADMIN — Medication 1 MILLIGRAM(S): at 09:12

## 2022-04-24 RX ADMIN — Medication 25 MILLIGRAM(S): at 09:14

## 2022-04-24 RX ADMIN — Medication 500000 UNIT(S): at 17:23

## 2022-04-24 RX ADMIN — DIPHENHYDRAMINE HYDROCHLORIDE AND LIDOCAINE HYDROCHLORIDE AND ALUMINUM HYDROXIDE AND MAGNESIUM HYDRO 10 MILLILITER(S): KIT at 18:37

## 2022-04-24 RX ADMIN — PANTOPRAZOLE SODIUM 40 MILLIGRAM(S): 20 TABLET, DELAYED RELEASE ORAL at 06:03

## 2022-04-24 RX ADMIN — Medication 1 PACKET(S): at 10:47

## 2022-04-24 RX ADMIN — MUPIROCIN 1 APPLICATION(S): 20 OINTMENT TOPICAL at 09:14

## 2022-04-24 RX ADMIN — CEFTRIAXONE 100 MILLIGRAM(S): 500 INJECTION, POWDER, FOR SOLUTION INTRAMUSCULAR; INTRAVENOUS at 10:28

## 2022-04-24 RX ADMIN — Medication 500000 UNIT(S): at 06:03

## 2022-04-24 RX ADMIN — Medication 12.5 MILLIGRAM(S): at 09:13

## 2022-04-24 RX ADMIN — DIPHENHYDRAMINE HYDROCHLORIDE AND LIDOCAINE HYDROCHLORIDE AND ALUMINUM HYDROXIDE AND MAGNESIUM HYDRO 10 MILLILITER(S): KIT at 12:16

## 2022-04-24 RX ADMIN — AMLODIPINE BESYLATE 5 MILLIGRAM(S): 2.5 TABLET ORAL at 09:13

## 2022-04-24 RX ADMIN — DIPHENHYDRAMINE HYDROCHLORIDE AND LIDOCAINE HYDROCHLORIDE AND ALUMINUM HYDROXIDE AND MAGNESIUM HYDRO 10 MILLILITER(S): KIT at 10:49

## 2022-04-24 RX ADMIN — PREGABALIN 1000 MICROGRAM(S): 225 CAPSULE ORAL at 09:13

## 2022-04-24 RX ADMIN — Medication 500000 UNIT(S): at 12:16

## 2022-04-24 RX ADMIN — VALACYCLOVIR 500 MILLIGRAM(S): 500 TABLET, FILM COATED ORAL at 09:14

## 2022-04-24 RX ADMIN — Medication 100 MICROGRAM(S): at 06:03

## 2022-04-24 RX ADMIN — MUPIROCIN 1 APPLICATION(S): 20 OINTMENT TOPICAL at 20:59

## 2022-04-24 RX ADMIN — Medication 1 PACKET(S): at 17:23

## 2022-04-24 RX ADMIN — Medication 1 PACKET(S): at 12:17

## 2022-04-24 NOTE — PROGRESS NOTE ADULT - SUBJECTIVE AND OBJECTIVE BOX
CHIEF COMPLAINT: Syncope/Mouth Pain    SUBJECTIVE: Mouth Pain unchanged. Difficult to eat. Denies fever, chills, dizziness, chest pain, SOB, nausea, vomiting    SIGNIFICANT INTERVAL EVENTS/OVERNIGHT EVENTS:   Fever morning of 4/23: no tachycardia; Patient herself denies chills, dizzines, weakness, or sensation of fever at the time of measurement. Continue to monitor.   4/24: BRBPR x 1. Plt down to 9K. Discussed with Hematology. Ordered 1 unit of plts. Fever overnight. Unclear source. Blood and Urine cultures negative. ID consulted.    Review of Systems: 14 Point review of systems reviewed and reported as negative unless otherwise stated in HPI    FROM H&P:  "89 F hx osteoarthritis, mitral regurgitation, hypothyroid, syncope, GERD, RA  HTN, HLD, aortic valve stenosis here s/p syncopal episode. pt woke up today feeling generally unwell and has been having severe pain in her mouth due to oral lesions. pt went to see dentist 2 days ago and was started on Valtrex for possible viral infection. pt was following up with Dr. Flores today and after examination was sitting on a chair when daughter noticed her head went forward. pt's top denture  fell out and she was drooling and pt was unresponsive for a few minutes and then pt seemed to wake up. pt with some difficulty with speech at first but that has self-resolved. pt taken by ambulance to the ER for further evaluation. pt denies fever, chills, cough, shortness of breath, chest pain prior to episode of present in the ER. pt has a [employer] PPM. also of note, pt in 2015 pt s/p fall and had a brain bleed. Pt back to baseline."    PHYSICAL EXAM:    T(C): 37.1 (04-24-22 @ 07:34), Max: 38.8 (04-23-22 @ 16:30)  HR: 77 (04-24-22 @ 07:34) (77 - 93)  BP: 132/60 (04-24-22 @ 07:34) (125/63 - 132/60)  RR: 18 (04-24-22 @ 07:34) (18 - 18)  SpO2: 97% (04-24-22 @ 07:34) (97% - 97%)    General: AAOx3; NAD  Head: AT/NC  ENT: Moist Mucous Membranes; Yellow tongue surface; Multiple painful white plaques on oral cavity mucous membranes; Dentures Pharynx clear.   Eyes: EOMI; PERRL  Neck: Non-tender; No JVD  CVS: RRR, S1&S2, No murmur, No edema  Respiratory: Lungs CTA B/L; Normal Respiratory Effort  Abdomen/GI: Soft, non-tender, non-distended, no guarding, no rebound, normal bowel sounds  : No bladder distention, No [place]  Extremities: No cyanosis, No clubbing, No edema  MSK: No CVA tenderness, Normal ROM, No injury  Neuro: AAOx3, CNII-XII grossly intact, non-focal  Psych: Appropriate, Cooperative,  Skin: Clean, Dry and Intact; Petechiae      LABS:                          9.4    4.73  )-----------( 9        ( 24 Apr 2022 07:53 )             27.8     04-24    135  |  105  |  22  ----------------------------<  98  3.9   |  23  |  0.82    Ca    8.7      24 Apr 2022 07:53  Phos  2.2     04-24  Mg     2.2     04-24    TPro  7.5  /  Alb  3.6  /  TBili  1.1  /  DBili  x   /  AST  30  /  ALT  31  /  AlkPhos  94  04-22    COVID-19 PCR: NotDetec (22 Apr 2022 12:56)    CAPILLARY BLOOD GLUCOSE          Culture - Urine (collected 22 Apr 2022 15:28)  Source: Clean Catch None  Final Report (24 Apr 2022 08:09):    >=3 organisms. Probable collection contamination.    Culture - Blood (collected 22 Apr 2022 13:18)  Source: .Blood Blood-Peripheral  Preliminary Report (24 Apr 2022 01:13):    No growth to date.                            10.7   4.07  )-----------( 19       ( 23 Apr 2022 08:10 )             32.0     04-23    134<L>  |  105  |  19  ----------------------------<  104<H>  4.3   |  22  |  0.88    Ca    9.0      23 Apr 2022 08:10  Phos  1.8     04-23  Mg     2.0     04-23    TPro  7.5  /  Alb  3.6  /  TBili  1.1  /  DBili  x   /  AST  30  /  ALT  31  /  AlkPhos  94  04-22    COVID-19 PCR: NotDetec (22 Apr 2022 12:56)    CAPILLARY BLOOD GLUCOSE      POCT Blood Glucose.: 87 mg/dL (22 Apr 2022 12:56)    Thyroid Stimulating Hormone, Serum: 1.59 uU/mL (04.23.22 @ 08:10) Phosphorus Level, Serum: 1.8 mg/dL (04.23.22 @ 08:10) Troponin I, High Sensitivity Result: 20.34: Lactate, Blood: 0.8 mmol/L (04.22.22 @ 17:01) Urine Microscopic-Add On (NC) (04.22.22 @ 15:27)   Epithelial Cells: Few   Comment - Urine: few amorph sed. occ mucus   Bacteria: Negative   Red Blood Cell - Urine: 0-2 /HPF   White Blood Cell - Urine: 11-25 Urinalysis (04.22.22 @ 15:27)   pH Urine: 5.0   Blood, Urine: Moderate   Glucose Qualitative, Urine: Negative   Color: Lindsey   Urine Appearance: Clear   Bilirubin: Negative   Ketone - Urine: Small   Specific Gravity: 1.020   Protein, Urine: 15   Urobilinogen: 1   Nitrite: Negative   Leukocyte Esterase Concentration: Moderate Troponin I, High Sensitivity Result: 13.10:       RADIOLOGY:  < from: CT Head No Cont (04.22.22 @ 13:56) >  IMPRESSION: Previously noted right frontal subdural hematoma is no longer  seen.    < end of copied text       ECHO:          I personally reviewed labs, imaging, ekg, orders and vitals.    Discussed case with:  [X]RN  [X]CM/SW  [X]Patient  [X]Family  [X]Specialist: Hematology        MEDICATIONS  (STANDING):  amLODIPine   Tablet 5 milliGRAM(s) Oral daily  aspirin  chewable 81 milliGRAM(s) Oral daily  cyanocobalamin 1000 MICROGram(s) Oral daily  folic acid 1 milliGRAM(s) Oral daily  hydrochlorothiazide 12.5 milliGRAM(s) Oral daily  levothyroxine 100 MICROGram(s) Oral daily  metoprolol succinate ER 25 milliGRAM(s) Oral daily  nystatin    Suspension [id number] Unit(s) Swish and Swallow four times a day  pantoprazole    Tablet 40 milliGRAM(s) Oral before breakfast  potassium phosphate IVPB 15 milliMole(s) IV Intermittent once  valACYclovir 500 milliGRAM(s) Oral two times a day    MEDICATIONS  (PRN):  acetaminophen     Tablet .. 650 milliGRAM(s) Oral every 6 hours PRN Temp greater or equal to 38C (100.4F), Mild Pain (1 - 3)  aluminum hydroxide/magnesium hydroxide/simethicone Suspension 30 milliLiter(s) Oral every 4 hours PRN Dyspepsia  FIRST- Mouthwash  BLM 10 milliLiter(s) Swish and Spit four times a day PRN Mouth Care  melatonin 3 milliGRAM(s) Oral at bedtime PRN Insomnia  ondansetron Injectable 4 milliGRAM(s) IV Push every 8 hours PRN Nausea and/or Vomiting

## 2022-04-24 NOTE — PROGRESS NOTE ADULT - ASSESSMENT
ASSESSMENT/PLAN:    #Syncope/Near Syncope  -Telemetry. PPM interrogated->no acute events  -orthostatics-> negative  -Troponin negative  -no focal deficits  -CT head negative for acute process  -TTE with known history of AS. Reorder.   -Now asymptomatic  -PT evaluation    #Fever without SIRS  -Unclear source  -Blood NGTD. Urine CLX > 3 organisms  -Start empiric ABX (4/24). ID consulted.   -Possible autoimmune process.     #Thrombocytopenia  New  -Dimer elevated but below age adjusted threshold  -PT/PTT WNL  -Plt down to 9K (4/24) with BRBPR on same day. Transfuse 1 unit of platelets.   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 Unclear source. Also with worsening platelets with intermittent bleeding. Continue to monitor Plt/Hgb. Hematology/ID evaluation. Patient verbalized desire of not wanting aggressive measures and wanting to go home. Palliative care evaluation.

## 2022-04-24 NOTE — PROVIDER CONTACT NOTE (OTHER) - SITUATION
CBC resulted platelets 40, H/H 8.4/25.1. no labs ordered for the morning.
Arianne QUINN spoke to Salomon the EP NP.
Arianne the pa spoke to Dr Tucker

## 2022-04-24 NOTE — PROVIDER CONTACT NOTE (CRITICAL VALUE NOTIFICATION) - ACTION/TREATMENT ORDERED:
awaiting hematology consult to assess patient
to review chart/ orders to follow
MD Mills aware. no new orders at this time, continue to monitor.

## 2022-04-25 DIAGNOSIS — I35.0 NONRHEUMATIC AORTIC (VALVE) STENOSIS: ICD-10-CM

## 2022-04-25 LAB
B BURGDOR IGG+IGM SER QL IB: SIGNIFICANT CHANGE UP
BASOPHILS # BLD AUTO: 0 K/UL — SIGNIFICANT CHANGE UP (ref 0–0.2)
BASOPHILS NFR BLD AUTO: 0 % — SIGNIFICANT CHANGE UP (ref 0–2)
CULTURE RESULTS: SIGNIFICANT CHANGE UP
EOSINOPHIL # BLD AUTO: 0.09 K/UL — SIGNIFICANT CHANGE UP (ref 0–0.5)
EOSINOPHIL NFR BLD AUTO: 2.4 % — SIGNIFICANT CHANGE UP (ref 0–6)
HCT VFR BLD CALC: 25.6 % — LOW (ref 34.5–45)
HGB BLD-MCNC: 8.6 G/DL — LOW (ref 11.5–15.5)
IMM GRANULOCYTES NFR BLD AUTO: 0.8 % — SIGNIFICANT CHANGE UP (ref 0–1.5)
LYMPHOCYTES # BLD AUTO: 0.58 K/UL — LOW (ref 1–3.3)
LYMPHOCYTES # BLD AUTO: 15.4 % — SIGNIFICANT CHANGE UP (ref 13–44)
MCHC RBC-ENTMCNC: 30.2 PG — SIGNIFICANT CHANGE UP (ref 27–34)
MCHC RBC-ENTMCNC: 33.6 GM/DL — SIGNIFICANT CHANGE UP (ref 32–36)
MCV RBC AUTO: 89.8 FL — SIGNIFICANT CHANGE UP (ref 80–100)
MONOCYTES # BLD AUTO: 0.05 K/UL — SIGNIFICANT CHANGE UP (ref 0–0.9)
MONOCYTES NFR BLD AUTO: 1.3 % — LOW (ref 2–14)
NEUTROPHILS # BLD AUTO: 3.01 K/UL — SIGNIFICANT CHANGE UP (ref 1.8–7.4)
NEUTROPHILS NFR BLD AUTO: 80.1 % — HIGH (ref 43–77)
PLATELET # BLD AUTO: 33 K/UL — LOW (ref 150–400)
PROT SERPL-MCNC: 5.4 G/DL — LOW (ref 6–8.3)
PROT SERPL-MCNC: 5.4 G/DL — LOW (ref 6–8.3)
RBC # BLD: 2.85 M/UL — LOW (ref 3.8–5.2)
RBC # FLD: 14.1 % — SIGNIFICANT CHANGE UP (ref 10.3–14.5)
SPECIMEN SOURCE: SIGNIFICANT CHANGE UP
WBC # BLD: 3.76 K/UL — LOW (ref 3.8–10.5)
WBC # FLD AUTO: 3.76 K/UL — LOW (ref 3.8–10.5)

## 2022-04-25 PROCEDURE — 99497 ADVNCD CARE PLAN 30 MIN: CPT | Mod: 25

## 2022-04-25 PROCEDURE — 93306 TTE W/DOPPLER COMPLETE: CPT | Mod: 26

## 2022-04-25 PROCEDURE — 99498 ADVNCD CARE PLAN ADDL 30 MIN: CPT

## 2022-04-25 PROCEDURE — 99233 SBSQ HOSP IP/OBS HIGH 50: CPT

## 2022-04-25 PROCEDURE — 99221 1ST HOSP IP/OBS SF/LOW 40: CPT

## 2022-04-25 RX ADMIN — Medication 500000 UNIT(S): at 06:00

## 2022-04-25 RX ADMIN — CEFTRIAXONE 100 MILLIGRAM(S): 500 INJECTION, POWDER, FOR SOLUTION INTRAMUSCULAR; INTRAVENOUS at 07:53

## 2022-04-25 RX ADMIN — PREGABALIN 1000 MICROGRAM(S): 225 CAPSULE ORAL at 12:23

## 2022-04-25 RX ADMIN — Medication 1 PACKET(S): at 17:14

## 2022-04-25 RX ADMIN — Medication 500000 UNIT(S): at 00:44

## 2022-04-25 RX ADMIN — Medication 500000 UNIT(S): at 12:24

## 2022-04-25 RX ADMIN — Medication 1 PACKET(S): at 07:53

## 2022-04-25 RX ADMIN — Medication 100 MICROGRAM(S): at 06:00

## 2022-04-25 RX ADMIN — MUPIROCIN 1 APPLICATION(S): 20 OINTMENT TOPICAL at 10:53

## 2022-04-25 RX ADMIN — MUPIROCIN 1 APPLICATION(S): 20 OINTMENT TOPICAL at 23:22

## 2022-04-25 RX ADMIN — Medication 1 PACKET(S): at 12:23

## 2022-04-25 RX ADMIN — DIPHENHYDRAMINE HYDROCHLORIDE AND LIDOCAINE HYDROCHLORIDE AND ALUMINUM HYDROXIDE AND MAGNESIUM HYDRO 10 MILLILITER(S): KIT at 18:07

## 2022-04-25 RX ADMIN — Medication 25 MILLIGRAM(S): at 10:53

## 2022-04-25 RX ADMIN — PANTOPRAZOLE SODIUM 40 MILLIGRAM(S): 20 TABLET, DELAYED RELEASE ORAL at 06:00

## 2022-04-25 RX ADMIN — Medication 500000 UNIT(S): at 17:14

## 2022-04-25 RX ADMIN — Medication 1 MILLIGRAM(S): at 12:24

## 2022-04-25 RX ADMIN — Medication 500000 UNIT(S): at 23:22

## 2022-04-25 NOTE — CONSULT NOTE ADULT - ASSESSMENT
89 F hx osteoarthritis, mitral regurgitation, hypothyroid, syncope, GERD, RA  HTN, HLD, aortic valve stenosis admitted on 4/22 for evaluation of syncope at PMD office while following up after being seen by dentist for oral pain; the dentist started valtrex for oral ulcers, the patient had syncope in dentist office as well. Patient complains of overall not feeling well, the worst complaint is oral pain. History per medical record, of note there is intermittent fevers.     1. Patient admitted for evaluation of syncope, noted with fever and thrombocytopenia; unclear if there is an infectious component to this versus smoldering hematologic process; noted with mouth pain, did not appreciate any ulcers  - will stop valtrex  - follow up cultures   - serial cbc and monitor temperature   - reviewed prior medical records to evaluate for resistant or atypical pathogens   - platelet transfusion, if this is ITP, the platelets may not be helpful, unless the patient is bleeding?  - hematology evaluation  - can continue the ceftriaxone for now  - will check for tick borne illnesses with lyme serology plus Babesia and Ehrlichea PCR  2. other issues: per medicine
89 F PMHx of OA, MR, hypothyroid, RA  HTN, HLD, PPM, AS presented to ED s/p syncopal episode. Pt reports she woke up in the morning feeling unwell, went to see her PMD Dr. Flores, while in doctors office sitting on a chair, her daughter noticed her head flopped forward, top denture fell out and she was drooling, was unresponsive for a few minutes, then came around, initially her speech seemed muffled and slurred but it cleared, was brought  by ambulance to the ER. No seizure activity, urinary incontinence was noted. CT head in ED - negative for acute findings. Pt does not have full recollection of the entire event but reports she probably passed out, denies preceding aura, premonition.    # Syncope, rule out less likely but remote possibility of seizure    - will order EEG  - Cardio w/u  - check orthostasis    Above D/W patient
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     
89 F hx osteoarthritis, mitral regurgitation, hypothyroid, syncope, GERD, RA  HTN, HLD, aortic valve stenosis here s/p syncopal episode. pt woke up today feeling generally unwell and has been having severe pain in her mouth due to oral lesions.    Process of Care  --Reviewed dx/treatment problems and alignment with Goals of Care    syncope/Near Syncope  Telemetry. PPM interrogated->no acute events  orthostatics-> negative  troponin negativeCT   head negative for acute process  TTE with known history of AS. Reorder. Pending    Fever without SIRS  Unclear source  Blood NGTD. Urine CLX > 3 organisms  Start empiric ABX (4/24). ID consulted.   Possible autoimmune process.     Thrombocytopenia  Anemia. Acute  Chronic Blood loss augmented with underlying Thrombocytopenia.   Multiple locations, nose bleeds, peripheral, hemorrhoidal.    RA on MTX  Hold MTX in the setting of thrombocytopenia and signs of immunocompromised state with mucositis and trush    Thrush ? mucositis  Nystatin/Magic Mouth Wash    HTN  Home medications    Physical Aspects of Care  --Pain  patient denies at this time  c/w current management  magic mouth wash    --Bowel Regimen  denies constipation  risk for constipation d/t immobility  daily dulcolax    --Dyspnea  No SOB at this time  comfortable and in NAD    --Nausea Vomiting  denies    --Weakness  PT as tolerated     Psychological and Psychiatric Aspects of Care:   Grief Bereavement emotional support provided  --Hx of psychiatric dx: none  -Pastoral Care Available PRN     Social Aspects of Care  -SW involved     Cultural Aspects  -Primary Language: English    Goals of Care:     We discussed Palliative Care team being a supportive team when a patient has ongoing illnesses.  We also discussed that it is not an end of life care service, but can help navigate symptoms and emotional support throughout their hospital stay here.    Hospice was explained as well  as an end of life care philosophy.  When a disease cannot be cured, or family/patient decide the treatment burdens out weigh the risk and one choses to change focus of treatment from cure to quality/comfort.     Pt at this time considering more of a hospice approach.  I did offer to call daughter, who needed to call us back       Ethical and Legal Aspects:   NA    Capacity: pt with capacity  HCP/ Surrogate: Daughter   Code Status:  DNR DNI  MOLST: dnr dni  Dispo Plan: pt hopes for DC to home    Discussed With: Case coordinated with attending and SW and RN     Time Spent: 90 minutes including the care, coordination and counseling of this patient, 50% of which was spent coordinating and counseling.

## 2022-04-25 NOTE — PROGRESS NOTE ADULT - ASSESSMENT
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 Hardeep Aj  cell: [phone]  Weekends and nights please call [phone] for MD on call  [facility]  Hematology/Oncology

## 2022-04-25 NOTE — PROGRESS NOTE ADULT - SUBJECTIVE AND OBJECTIVE BOX
CHIEF COMPLAINT: Syncope/Mouth Pain    SUBJECTIVE: Mouth Pain with only mild interval improvement. Difficult to eat. Tire of medical intervention. Denies fever, chills, dizziness, chest pain, SOB, nausea, vomiting    SIGNIFICANT INTERVAL EVENTS/OVERNIGHT EVENTS:   Fever morning of 4/23: no tachycardia; Patient herself denies chills, dizzines, weakness, or sensation of fever at the time of measurement. Continue to monitor.   4/24: BRBPR x 1. Plt down to 9K. Discussed with Hematology. Ordered 1 unit of plts. Fever overnight. Unclear source. Blood and Urine cultures negative. ID consulted.  4/25: Initial improvement in plt s/p transfusion. Plt subsquently starting to trend downwards. Continuing to monitor. Hem recs. ID on board.    Review of Systems: 14 Point review of systems reviewed and reported as negative unless otherwise stated in HPI    FROM H&P:  "89 F hx osteoarthritis, mitral regurgitation, hypothyroid, syncope, GERD, RA  HTN, HLD, aortic valve stenosis here s/p syncopal episode. pt woke up today feeling generally unwell and has been having severe pain in her mouth due to oral lesions. pt went to see dentist 2 days ago and was started on Valtrex for possible viral infection. pt was following up with Dr. Flores today and after examination was sitting on a chair when daughter noticed her head went forward. pt's top denture  fell out and she was drooling and pt was unresponsive for a few minutes and then pt seemed to wake up. pt with some difficulty with speech at first but that has self-resolved. pt taken by ambulance to the ER for further evaluation. pt denies fever, chills, cough, shortness of breath, chest pain prior to episode of present in the ER. pt has a [employer] PPM. also of note, pt in 2015 pt s/p fall and had a brain bleed. Pt back to baseline."    PHYSICAL EXAM:    T(C): 37.7 (04-25-22 @ 07:09), Max: 37.7 (04-24-22 @ 15:37)  HR: 88 (04-25-22 @ 07:09) (88 - 96)  BP: 94/54 (04-25-22 @ 07:09) (94/54 - 125/64)  RR: 18 (04-25-22 @ 07:09) (18 - 19)  SpO2: 93% (04-25-22 @ 07:09) (93% - 100%)    General: AAOx3; NAD  Head: AT/NC  ENT: Moist Mucous Membranes; Yellow tongue surface; Multiple painful white plaques on oral cavity mucous membranes; Dentures Pharynx clear.   Eyes: EOMI; PERRL  Neck: Non-tender; No JVD  CVS: RRR, S1&S2, No murmur, No edema  Respiratory: Lungs CTA B/L; Normal Respiratory Effort  Abdomen/GI: Soft, non-tender, non-distended, no guarding, no rebound, normal bowel sounds  : No bladder distention, No [place]  Extremities: No cyanosis, No clubbing, No edema  MSK: No CVA tenderness, Normal ROM, No injury  Neuro: AAOx3, CNII-XII grossly intact, non-focal  Psych: Appropriate, Cooperative,  Skin: Clean, Dry and Intact; Petechiae      LABS:                          8.6    3.76  )-----------( 33       ( 25 Apr 2022 06:56 )             25.6     04-24    135  |  105  |  22  ----------------------------<  98  3.9   |  23  |  0.82    Ca    8.7      24 Apr 2022 07:53  Phos  2.2     04-24  Mg     2.2     04-24      COVID-19 PCR: NotDetec (22 Apr 2022 12:56)    CAPILLARY BLOOD GLUCOSE          Babesia microti PCR, Blood. (collected 24 Apr 2022 15:45)  Source: .Blood None  Final Report (25 Apr 2022 01:10):    Babesia microti PCR    Results: NOT detected    ***************Result Note*************    The detection of Babesia microti by PCR has only been    validated for whole blood; this test has not been approved    by the [place] Food and Drug Administration (FDA). Performance    characteristics of this assay have been determined by    [employer]. The clinical significance    of results should be considered in conjunction with the    overall clinical presentation of the patient. Result is not    intended to be used as the sole means for clinical diagnosis    or patient management decisions.    One negative sample does not necessarily rule    out the presence of a parasitic infection.    Culture - Urine (collected 22 Apr 2022 15:28)  Source: Clean Catch None  Final Report (24 Apr 2022 08:09):    >=3 organisms. Probable collection contamination.                            9.4    4.73  )-----------( 9        ( 24 Apr 2022 07:53 )             27.8     04-24    135  |  105  |  22  ----------------------------<  98  3.9   |  23  |  0.82    Ca    8.7      24 Apr 2022 07:53  Phos  2.2     04-24  Mg     2.2     04-24    TPro  7.5  /  Alb  3.6  /  TBili  1.1  /  DBili  x   /  AST  30  /  ALT  31  /  AlkPhos  94  04-22    COVID-19 PCR: NotDetec (22 Apr 2022 12:56)    CAPILLARY BLOOD GLUCOSE          Culture - Urine (collected 22 Apr 2022 15:28)  Source: Clean Catch None  Final Report (24 Apr 2022 08:09):    >=3 organisms. Probable collection contamination.    Culture - Blood (collected 22 Apr 2022 13:18)  Source: .Blood Blood-Peripheral  Preliminary Report (24 Apr 2022 01:13):    No growth to date.                            10.7   4.07  )-----------( 19       ( 23 Apr 2022 08:10 )             32.0     04-23    134<L>  |  105  |  19  ----------------------------<  104<H>  4.3   |  22  |  0.88    Ca    9.0      23 Apr 2022 08:10  Phos  1.8     04-23  Mg     2.0     04-23    TPro  7.5  /  Alb  3.6  /  TBili  1.1  /  DBili  x   /  AST  30  /  ALT  31  /  AlkPhos  94  04-22    COVID-19 PCR: [employer] (22 Apr 2022 12:56)    CAPILLARY BLOOD GLUCOSE      POCT Blood Glucose.: 87 mg/dL (22 Apr 2022 12:56)    Thyroid Stimulating Hormone, Serum: 1.59 uU/mL (04.23.22 @ 08:10) Phosphorus Level, Serum: 1.8 mg/dL (04.23.22 @ 08:10) Troponin I, High Sensitivity Result: 20.34: Lactate, Blood: 0.8 mmol/L (04.22.22 @ 17:01) Urine Microscopic-Add On (NC) (04.22.22 @ 15:27)   Epithelial Cells: Few   Comment - Urine: few amorph sed. occ mucus   Bacteria: Negative   Red Blood Cell - Urine: 0-2 /HPF   White Blood Cell - Urine: 11-25 Urinalysis (04.22.22 @ 15:27)   pH Urine: 5.0   Blood, Urine: Moderate   Glucose Qualitative, Urine: Negative   Color: Lindsey   Urine Appearance: Clear   Bilirubin: Negative   Ketone - Urine: Small   Specific Gravity: 1.020   Protein, Urine: 15   Urobilinogen: 1   Nitrite: Negative     Leukocyte Esterase Concentration: Moderate Troponin I, High Sensitivity Result: 13.10:       RADIOLOGY:  < from: CT Head No Cont (04.22.22 @ 13:56) >  IMPRESSION: Previously noted right frontal subdural hematoma is no longer  seen.    < end of copied text     < from: CT Abdomen and Pelvis w/ IV Cont (04.24.22 @ 18:00) >  FINDINGS:  LOWER CHEST: Bibasilar atelectasis.    LIVER: Normal.  BILE DUCTS: Nondilated.  GALLBLADDER: Prior cholecystectomy.  SPLEEN: Punctate granulomas.  PANCREAS: Diffuse atrophy.  ADRENALS: Normal.  KIDNEYS/URETERS: No calculi, hydronephrosis, or soft tissue attenuating   mass.    BLADDER: Normal.  REPRODUCTIVE ORGANS: No uterine or adnexal abnormality.    BOWEL: No bowel-related abnormality. Specifically, no evidence of acute   diverticulitis. Normal appendix and ileocecal region. No bowel   obstruction or bowel inflammation. Moderate hiatal hernia.  PERITONEUM: No free air or ascites.  VESSELS: 3.2 cm infrarenal abdominal aortic aneurysm.  RETROPERITONEUM/LYMPH NODES: No adenopathy or hematoma.  ABDOMINAL WALL: Normal.  BONES: No acute bony abnormality.    IMPRESSION:  *  No acute pathology.    < end of copied text >        I personally reviewed labs, imaging, ekg, orders and vitals.    Discussed case with:  [X]RN  [X]CM/SW  [X]Patient  [X]Family  [X]Specialist: Hematology/ID

## 2022-04-25 NOTE — CONSULT NOTE ADULT - SUBJECTIVE AND OBJECTIVE BOX
CC: 89 y old  Female who presents with a chief complaint of Near syncope (23 Apr 2022 10:03)      HPI:  89 F PMHx of OA, MR, hypothyroid, RA  HTN, HLD, aortic valve stenosis presented to ED s/p syncopal episode. Pt reports she woke up in the morning feeling unwell, has been having severe pain in her mouth due to oral lesions, she had seen dentist 2 days ago and was started on Valtrex for possible viral infection. Pt went to see her PMD Dr. Flores, while in doctors office sitting on a chair, her daughter noticed her head flopped forward, top denture fell out and she was drooling, was unresponsive for a few minutes, then came around, initially her speech seemed muffled ans slurred but it cleared, she was brought  by ambulance to the ER for further evaluation. No seizure activity, urinary incontinence was noted. CT head in ED - negative for acute findings. Pt does not have full recollection of the entire event but reports she probably passed out, denies preceding aura, premonition.    Denies HA, fever, shortness of breath, chest pain. Pt had SDH in 2015 s/p fall, no SDH noted on current CT brain bleed.       PAST MEDICAL & SURGICAL HISTORY:  Intraventricular block    Actinic keratosis    Aortic valve stenosis    Back pain    Hard of hearing    Subdural hematoma    Dry eyes    Fatigue    HLD (hyperlipidemia)    HTN (hypertension)    GERD (gastroesophageal reflux disease)    Syncope and collapse    Dehydration    Hypothyroid    MR (mitral regurgitation)    Nocturia    Urine incontinence    Osteoarthritis of back    H/O brain surgery, subdural  bleed,  had  chris holes    Bilateral cataracts    History of tonsillectomy    History of cholecystectomy    H/O hand surgery        FAMILY HISTORY:  Family history of heart attack (Father)        Social Hx:  Nonsmoker, no drug or alcohol use, lives at home      MEDICATIONS  (STANDING):  amLODIPine   Tablet 5 milliGRAM(s) Oral daily  aspirin  chewable 81 milliGRAM(s) Oral daily  cyanocobalamin 1000 MICROGram(s) Oral daily  folic acid 1 milliGRAM(s) Oral daily  hydrochlorothiazide 12.5 milliGRAM(s) Oral daily  levothyroxine 100 MICROGram(s) Oral daily  metoprolol succinate ER 25 milliGRAM(s) Oral daily  nystatin    Suspension 461544 Unit(s) Swish and Swallow four times a day  pantoprazole    Tablet 40 milliGRAM(s) Oral before breakfast  potassium phosphate IVPB 15 milliMole(s) IV Intermittent once  valACYclovir 500 milliGRAM(s) Oral two times a day       Home Medications:   * Patient Currently Takes Medications as of 22-Apr-2022 14:19 documented in Structured Notes  · 	valACYclovir 500 mg oral tablet: 1 tab(s) orally 2 times a day x 10 days  	***Course Not Complete***  · 	Metoprolol Succinate ER 25 mg oral tablet, extended release: Last Dose Taken:  , 1 tab(s) orally once a day   · 	amLODIPine 5 mg oral tablet: Last Dose Taken:  , 1 tab(s) orally once a day  · 	folic acid 1 mg oral tablet: Last Dose Taken:  , 1 tab(s) orally once a day  · 	hydroCHLOROthiazide 12.5 mg oral capsule: Last Dose Taken:  , 1 cap(s) orally once a day  · 	methotrexate 2.5 mg oral tablet: Last Dose Taken:  , 5 tab(s) orally once a week on Fri  	***med is on hold until patient finishes Valtrex***  · 	omeprazole 20 mg oral delayed release capsule: 1 cap(s) orally 2 times a day  · 	Vitamin D3 25 mcg (1000 intl units) oral tablet: 5 tab(s) orally once a day  · 	cyanocobalamin 500 mcg oral tablet: Last Dose Taken:  , 1 tab(s) orally once a day  · 	Synthroid 100 mcg (0.1 mg) oral tablet: Last Dose Taken:  , 1 tab(s) orally once a day      Allergies    No Known Allergies    Intolerances    aspirin (Other)      ROS: Pertinent positives in HPI, all other ROS were reviewed and are negative.        Vital Signs Last 24 Hrs  T(C): 38.4 (23 Apr 2022 07:40), Max: 38.4 (23 Apr 2022 07:40)  T(F): 101.2 (23 Apr 2022 07:40), Max: 101.2 (23 Apr 2022 07:40)  HR: 87 (23 Apr 2022 07:40) (87 - 103)  BP: 136/69 (23 Apr 2022 07:40) (107/44 - 199/92)  BP(mean): 86 (23 Apr 2022 07:40) (86 - 100)  RR: 18 (23 Apr 2022 07:40) (18 - 18)  SpO2: 95% (23 Apr 2022 07:40) (95% - 100%)      Gen exam"  Normocephalic, in no distress, awake and alert.  HEENT: PERRLA, EOMI,   Neck: Supple.  Respiratory: Breath sounds are clear bilaterally  Cardiovascular: S1 and S2, regular  Extremities:  no edema  Vascular: Caritid Bruit - no  Musculoskeletal: no abnormal movements  Skin: No rashes      Neurological exam:  HF: A x O x 3. Appropriately interactive, normal affect. Speech fluent, No Aphasia or paraphasic errors. Naming /repetition intact   CN: KRISTY, EOMI, VFF, facial sensation normal, no NLFD, tongue midline, Palate moves equally, SCM equal bilaterally  Motor: No pronator drift, Strength 5/5 in all 4 ext no tremor, rigidity   Sens: Intact to light touch    Reflexes: Symmetric and normal, downgoing toes b/l  Coord:  No FNFA, dysmetria   Gait/Balance: Not tested      Labs:   04-23    134<L>  |  105  |  19  ----------------------------<  104<H>  4.3   |  22  |  0.88    Ca    9.0      23 Apr 2022 08:10  Phos  1.8     04-23  Mg     2.0     04-23    TPro  7.5  /  Alb  3.6  /  TBili  1.1  /  DBili  x   /  AST  30  /  ALT  31  /  AlkPhos  94  04-22                          10.7   4.07  )-----------( 19       ( 23 Apr 2022 08:10 )             32.0       Radiology:  < from: CT Head No Cont (04.22.22 @ 13:56) >  Parenchymal volume loss and chronic microvascular ischemic changes are   identified. These findings have increased when compared with the prior  exam.    There is no evidence of acute hemorrhage mass or mass effect seen.    Previously noted right frontal subdural hematoma is no longer appreciated.    Evaluation of the osseous structures with the appropriate window appears   normal    High right frontal and parietal chris hole is no longer seen.    The visualized paranasal sinuses mastoid and middle ear regions appears   clear.    IMPRESSION: Previously noted right frontal subdural hematoma is no longer  seen.    
Patient is a 89y old  Female who presents with a chief complaint of Near syncope (2022 09:55)    HPI:  89 F hx osteoarthritis, mitral regurgitation, hypothyroid, syncope, GERD, RA  HTN, HLD, aortic valve stenosis admitted on  for evaluation of syncope at PMD office while following up after being seen by dentist for oral pain; the dentist started valtrex for oral ulcers, the patient had syncope in dentist office as well. Patient complains of overall not feeling well, the worst complaint is oral pain. History per medical record, of note there is intermittent fevers.           PMH: as above  PSH: as above  Meds: per reconciliation sheet, noted below  MEDICATIONS  (STANDING):  amLODIPine   Tablet 5 milliGRAM(s) Oral daily  cefTRIAXone   IVPB 1000 milliGRAM(s) IV Intermittent every 24 hours  cyanocobalamin 1000 MICROGram(s) Oral daily  folic acid 1 milliGRAM(s) Oral daily  hydrochlorothiazide 12.5 milliGRAM(s) Oral daily  levothyroxine 100 MICROGram(s) Oral daily  metoprolol succinate ER 25 milliGRAM(s) Oral daily  mupirocin 2% Ointment 1 Application(s) Both Nostrils every 12 hours  nystatin    Suspension 119323 Unit(s) Swish and Swallow four times a day  pantoprazole    Tablet 40 milliGRAM(s) Oral before breakfast  potassium phosphate / sodium phosphate Powder (PHOS-NaK) 1 Packet(s) Oral three times a day with meals  valACYclovir 500 milliGRAM(s) Oral two times a day    MEDICATIONS  (PRN):  acetaminophen     Tablet .. 650 milliGRAM(s) Oral every 6 hours PRN Temp greater or equal to 38C (100.4F), Mild Pain (1 - 3)  aluminum hydroxide/magnesium hydroxide/simethicone Suspension 30 milliLiter(s) Oral every 4 hours PRN Dyspepsia  FIRST- Mouthwash  BLM 10 milliLiter(s) Swish and Spit four times a day PRN Mouth Care  melatonin 3 milliGRAM(s) Oral at bedtime PRN Insomnia  ondansetron Injectable 4 milliGRAM(s) IV Push every 8 hours PRN Nausea and/or Vomiting    Allergies    No Known Allergies    Intolerances    aspirin (Other)    Social: no smoking, no alcohol, no illegal drugs; no recent travel, no exposure to TB  FAMILY HISTORY:  Family history of heart attack (Father)    ROS unable to obtain secondary to patient medical condition     Vital Signs Last 24 Hrs  T(C): 38.6 (2022 11:14), Max: 38.8 (2022 16:30)  T(F): 101.4 (2022 11:14), Max: 101.9 (2022 16:30)  HR: 96 (2022 11:14) (77 - 96)  BP: 115/54 (2022 11:14) (115/54 - 132/60)  BP(mean): 77 (2022 07:34) (77 - 77)  RR: 18 (2022 11:14) (18 - 18)  SpO2: 98% (2022 11:14) (97% - 98%)  Daily     Daily Weight in k.4 (2022 03:07)    PE:    Constitutional: frail looking  HEENT: NC/AT, EOMI, PERRLA, conjunctivae clear; ears and nose atraumatic; pharynx with petechiae on hard palate, yellow tongue  Neck: supple; thyroid not palpable  Back: no tenderness  Respiratory: respiratory effort normal; clear to auscultation  Cardiovascular: S1S2 regular, no murmurs  Abdomen: soft, not tender, not distended, positive BS; no liver or spleen organomegaly  Genitourinary: no suprapubic tenderness  Musculoskeletal: no muscle tenderness, no joint swelling or tenderness  Neurological/ Psychiatric: AxOx3, judgement and insight normal;  moving all extremities  Skin: no rashes; no palpable lesions    Labs: all available labs reviewed                        9.4    4.73  )-----------( 9        ( 2022 07:53 )             27.8     04-24    135  |  105  |  22  ----------------------------<  98  3.9   |  23  |  0.82    Ca    8.7      2022 07:53  Phos  2.2     04-24  Mg     2.2     04-24    TPro  7.5  /  Alb  3.6  /  TBili  1.1  /  DBili  x   /  AST  30  /  ALT  31  /  AlkPhos  94  04-22     LIVER FUNCTIONS - ( 2022 13:14 )  Alb: 3.6 g/dL / Pro: 7.5 gm/dL / ALK PHOS: 94 U/L / ALT: 31 U/L / AST: 30 U/L / GGT: x           Urinalysis Basic - ( 2022 15:27 )    Color: Lindsey / Appearance: Clear / S.020 / pH: x  Gluc: x / Ketone: Small  / Bili: Negative / Urobili: 1   Blood: x / Protein: 15 / Nitrite: Negative   Leuk Esterase: Moderate / RBC: 0-2 /HPF / WBC 11-25   Sq Epi: x / Non Sq Epi: Few / Bacteria: Negative    < from: CT Head No Cont (22 @ 13:56) >    ACC: [id number] EXAM:  CT BRAIN                          PROCEDURE DATE:  2022          INTERPRETATION:  Clinical indication: Syncope. No older.    Multiple axial sections were performed from base skull to vertex without   contrast enhancement.Coronal and sagittal destructions were performed as   well    This exam is compared prior noncontrast head CT performed on 2015.    Parenchymal volume loss and chronic microvascular ischemic changes are   identified. These findings have increased when compared with the prior   exam.    There is no evidence of acute hemorrhage mass or mass effect seen.    Previously noted right frontal subdural hematoma is no longer appreciated.    Evaluation of the osseous structures with the appropriate window appears   normal    High right frontal and parietal chris hole is no longer seen.    The visualized paranasal sinuses mastoid and middle ear regions appears   clear.    IMPRESSION: Previously noted right frontal subdural hematoma is no longer  seen.    < end of copied text >        Radiology: all available radiological tests reviewed    Advanced directives addressed: full resuscitation
Patient is a 89y old  Female who presents with a chief complaint of Near syncope (24 Apr 2022 12:31)      HPI:  89 F hx osteoarthritis, mitral regurgitation, hypothyroid, syncope, GERD, RA  HTN, HLD, aortic valve stenosis here s/p syncopal episode. pt woke up today feeling generally unwell and has been having severe pain in her mouth due to oral lesions. pt went to see dentist 2 days ago and was started on Valtrex for possible viral infection. pt was following up with Dr. Flores today and after examination was sitting on a chair when daughter noticed her head went forward. pt's top denture  fell out and she was drooling and pt was unresponsive for a few minutes and then pt seemed to wake up. pt with some difficulty with speech at first but that has self-resolved. pt taken by ambulance to the ER for further evaluation. pt denies fever, chills, cough, shortness of breath, chest pain prior to episode of present in the ER. pt has a [employer] PPM. also of note, pt in 2015 pt s/p fall and had a brain bleed. Pt back to baseline. (22 Apr 2022 15:09)   Hematology called for progressive thrombocytopenia.  Plts today 9000. no bleeding.    d/w daughter at bedside.  no h/o thromboctyopenia in the past. pt on chronic MTX 6 pills weeky for RA, currently on hold.        REVIEW OF SYSTEMS:    CONSTITUTIONAL: No weakness, fevers or chills  EYES/ENT: No visual changes;  No vertigo or throat pain   NECK: No pain or stiffness  RESPIRATORY: No cough, wheezing, hemoptysis; No shortness of breath  CARDIOVASCULAR: No chest pain or palpitations  GASTROINTESTINAL: No abdominal or epigastric pain. No nausea, vomiting, or hematemesis; No diarrhea or constipation. No melena or hematochezia.  GENITOURINARY: No dysuria, frequency or hematuria  NEUROLOGICAL: No numbness or weakness  SKIN: No itching, burning, rashes, or lesions   All other review of systems is negative unless indicated above.    PAST MEDICAL & SURGICAL HISTORY:  Intraventricular block    Actinic keratosis    Aortic valve stenosis    Back pain    Hard of hearing    Subdural hematoma    Dry eyes    Fatigue    HLD (hyperlipidemia)    HTN (hypertension)    GERD (gastroesophageal reflux disease)    Syncope and collapse    Dehydration    Hypothyroid    MR (mitral regurgitation)    Nocturia    Urine incontinence    Osteoarthritis of back    H/O brain surgery  subdural  bleed,  had  chris holes    Bilateral cataracts    History of tonsillectomy    History of cholecystectomy    H/O hand surgery        SOCIAL HISTORY:    FAMILY HISTORY:  Family history of heart attack (Father)        MEDICATIONS  (STANDING):  amLODIPine   Tablet 5 milliGRAM(s) Oral daily  cefTRIAXone   IVPB 1000 milliGRAM(s) IV Intermittent every 24 hours  cyanocobalamin 1000 MICROGram(s) Oral daily  folic acid 1 milliGRAM(s) Oral daily  hydrochlorothiazide 12.5 milliGRAM(s) Oral daily  levothyroxine 100 MICROGram(s) Oral daily  metoprolol succinate ER 25 milliGRAM(s) Oral daily  mupirocin 2% Ointment 1 Application(s) Both Nostrils every 12 hours  nystatin    Suspension 278225 Unit(s) Swish and Swallow four times a day  pantoprazole    Tablet 40 milliGRAM(s) Oral before breakfast  potassium phosphate / sodium phosphate Powder (PHOS-NaK) 1 Packet(s) Oral three times a day with meals    MEDICATIONS  (PRN):  acetaminophen     Tablet .. 650 milliGRAM(s) Oral every 6 hours PRN Temp greater or equal to 38C (100.4F), Mild Pain (1 - 3)  aluminum hydroxide/magnesium hydroxide/simethicone Suspension 30 milliLiter(s) Oral every 4 hours PRN Dyspepsia  FIRST- Mouthwash  BLM 10 milliLiter(s) Swish and Spit four times a day PRN Mouth Care  melatonin 3 milliGRAM(s) Oral at bedtime PRN Insomnia  ondansetron Injectable 4 milliGRAM(s) IV Push every 8 hours PRN Nausea and/or Vomiting      Allergies    No Known Allergies    Intolerances    aspirin (Other)      Vital Signs Last 24 Hrs  T(C): 38.6 (24 Apr 2022 11:14), Max: 38.8 (23 Apr 2022 16:30)  T(F): 101.4 (24 Apr 2022 11:14), Max: 101.9 (23 Apr 2022 16:30)  HR: 96 (24 Apr 2022 11:14) (77 - 96)  BP: 115/54 (24 Apr 2022 11:14) (115/54 - 132/60)  BP(mean): 77 (24 Apr 2022 07:34) (77 - 77)  RR: 18 (24 Apr 2022 11:14) (18 - 18)  SpO2: 98% (24 Apr 2022 11:14) (97% - 98%)    PHYSICAL EXAM  General: adult in NAD  HEENT: clear oropharynx, anicteric sclera, pink conjunctiva  Neck: supple  CV: normal S1/S2 with no murmur rubs or gallops  Lungs: positive air movement b/l ant lungs,clear to auscultation, no wheezes, no rales  Abdomen: soft non-tender non-distended, no hepatosplenomegaly  Ext: no clubbing cyanosis or edema  Skin: no rashes and no petechiae  Neuro: alert and oriented X 4, no focal deficits      LABS:                          9.4    4.73  )-----------( 9        ( 24 Apr 2022 07:53 )             27.8         Mean Cell Volume : 90.3 fl  Mean Cell Hemoglobin : 30.5 pg  Mean Cell Hemoglobin Concentration : 33.8 gm/dL  Auto Neutrophil # : 4.04 K/uL  Auto Lymphocyte # : 0.50 K/uL  Auto Monocyte # : 0.05 K/uL  Auto Eosinophil # : 0.10 K/uL  Auto Basophil # : 0.00 K/uL  Auto Neutrophil % : 85.4 %  Auto Lymphocyte % : 10.6 %  Auto Monocyte % : 1.0 %  Auto Eosinophil % : 2.1 %  Auto Basophil % : 0.0 %      Serial CBC's  04-24 @ 07:53  Hct-27.8 / Hgb-9.4 / Plat-9 / RBC-3.08 / WBC-4.73  Serial CBC's  04-23 @ 19:56  Hct-29.5 / Hgb-9.8 / Plat-16 / RBC-3.21 / WBC-5.54  Serial CBC's  04-23 @ 08:10  Hct-32.0 / Hgb-10.7 / Plat-19 / RBC-3.52 / WBC-4.07  Serial CBC's  04-22 @ 13:14  Hct-35.1 / Hgb-11.7 / Plat-31 / RBC-3.80 / WBC-4.78      04-24    135  |  105  |  22  ----------------------------<  98  3.9   |  23  |  0.82    Ca    8.7      24 Apr 2022 07:53  Phos  2.2     04-24  Mg     2.2     04-24            Vitamin B12, Serum: 736 pg/mL (04-23 @ 19:56)  Folate, Serum: >20.0 ng/mL (04-23 @ 19:56)              BLOOD SMEAR INTERPRETATION:       RADIOLOGY & ADDITIONAL STUDIES:        
  HPI:    89 F hx osteoarthritis, mitral regurgitation, hypothyroid, syncope, GERD, RA  HTN, HLD, aortic valve stenosis here s/p syncopal episode. pt woke up today feeling generally unwell and has been having severe pain in her mouth due to oral lesions. pt went to see dentist 2 days ago and was started on Valtrex for possible viral infection. pt was following up with Dr. Flores today and after examination was sitting on a chair when daughter noticed her head went forward. pt's top denture  fell out and she was drooling and pt was unresponsive for a few minutes and then pt seemed to wake up. pt with some difficulty with speech at first but that has self-resolved. pt taken by ambulance to the ER for further evaluation. pt denies fever, chills, cough, shortness of breath, chest pain prior to episode of present in the ER. pt has a [employer] PPM. also of note, pt in  pt s/p fall and had a brain bleed. Pt back to baseline.    22   pt seen and examined  feeling weak, w/ mouth sores and pain   states she does not have sob, nausea or vomiting         PAIN: ( x)Yes   ( )No  Level: moderate  Location: [facility]  Intensity:    4/10  Quality: aching   Aggravating Factors: food  Alleviating Factors: nothing  Radiation: none  Duration/Timing:constant  Impact on ADLs: decreased po    DYSPNEA: ( ) Yes  (x ) No  Level:    PAST MEDICAL & SURGICAL HISTORY:    Intraventricular block  Actinic keratosis  Aortic valve stenosis  Back pain  Hard of hearing  Subdural hematoma  Dry eyes  Fatigue  HLD (hyperlipidemia)  HTN (hypertension)  GERD (gastroesophageal reflux disease)  Syncope and collapse  Dehydration  Hypothyroid  MR (mitral regurgitation)  Nocturia  Urine incontinence  Osteoarthritis of back  H/O brain surgery  subdural  bleed,  had  chris holes  Bilateral cataracts  History of tonsillectomy  History of cholecystectomy  H/O hand surgery      SOCIAL HX:    Hx opiate tolerance ( )YES  (x )NO    Baseline ADLs  (Prior to Admission)  ( ) Independent   (x )Dependent    FAMILY HISTORY:  Family history of heart attack (Father)    Review of Systems:     Anxiety- denies  Depression-denies  Physical Discomfort- in NAD  Dyspnea-denies  Constipation-denies  Diarrhea-denies  Nausea-denies  Vomiting-denies  Anorexia-denies  Weight Loss- denies  Cough-denies  Secretions-denies  Fatigue-denies  Weakness-denies  Delirium-denies    All other systems reviewed and negative     PHYSICAL EXAM:    Vital Signs Last 24 Hrs  T(C): 37.7 (2022 07:09), Max: 37.7 (2022 15:37)  T(F): 99.9 (2022 07:09), Max: 99.9 (2022 15:37)  HR: 88 (2022 07:09) (88 - 96)  BP: 94/54 (2022 07:09) (94/54 - 125/64)  BP(mean): 62 (2022 07:09) (62 - 62)  RR: 18 (2022 07:09) (18 - 19)  SpO2: 93% (2022 07:09) (93% - 100%)  Daily     Daily Weight in k.3 (2022 04:29)    PPSV2:   45%  FAST: na    General: nad  Mental Status: a+Ox3 in NAD   HEENT: eomi  Lungs: ctabl  Cardiac: s1s2  GI: soft nontender   : voids  Ext: moves all extremities   Neuro: a+Ox3 follows commands     LABS:                        8.6    3.76  )-----------( 33       ( 2022 06:56 )             25.6     04-24    135  |  105  |  22  ----------------------------<  98  3.9   |  23  |  0.82    Ca    8.7      2022 07:53  Phos  2.2     04-24  Mg     2.2     04-24        Albumin: Albumin, Serum: 3.6 g/dL ( @ 13:14)      Allergies    No Known Allergies    Intolerances    aspirin (Other)    MEDICATIONS  (STANDING):  amLODIPine   Tablet 5 milliGRAM(s) Oral daily  cefTRIAXone   IVPB 1000 milliGRAM(s) IV Intermittent every 24 hours  cyanocobalamin 1000 MICROGram(s) Oral daily  folic acid 1 milliGRAM(s) Oral daily  hydrochlorothiazide 12.5 milliGRAM(s) Oral daily  levothyroxine 100 MICROGram(s) Oral daily  metoprolol succinate ER 25 milliGRAM(s) Oral daily  mupirocin 2% Ointment 1 Application(s) Both Nostrils every 12 hours  nystatin    Suspension [id number] Unit(s) Swish and Swallow four times a day  pantoprazole    Tablet 40 milliGRAM(s) Oral before breakfast  potassium phosphate / sodium phosphate Powder (PHOS-NaK) 1 Packet(s) Oral three times a day with meals    MEDICATIONS  (PRN):  acetaminophen     Tablet .. 650 milliGRAM(s) Oral every 6 hours PRN Temp greater or equal to 38C (100.4F), Mild Pain (1 - 3)  aluminum hydroxide/magnesium hydroxide/simethicone Suspension 30 milliLiter(s) Oral every 4 hours PRN Dyspepsia  FIRST- Mouthwash  BLM 10 milliLiter(s) Swish and Spit four times a day PRN Mouth Care  melatonin 3 milliGRAM(s) Oral at bedtime PRN Insomnia  ondansetron Injectable 4 milliGRAM(s) IV Push every 8 hours PRN Nausea and/or Vomiting      RADIOLOGY/ADDITIONAL STUDIES:

## 2022-04-25 NOTE — PHYSICAL THERAPY INITIAL EVALUATION ADULT - PERTINENT HX OF CURRENT PROBLEM, REHAB EVAL
89 F hx osteoarthritis, mitral regurgitation, hypothyroid, syncope, GERD, RA  HTN, HLD, aortic valve stenosis admitted on 4/22 for evaluation of syncope , found to have progressive thrombocytopenia , oral ulcers, and fevers

## 2022-04-25 NOTE — PROGRESS NOTE ADULT - SUBJECTIVE AND OBJECTIVE BOX
INTERVAL HPI/OVERNIGHT EVENTS:  Patient S&E at bedside. Last fever 101.4 at 1115 yesterday, afebrile overnight. BP 94/54 this am. Pt reports not feeling great today, has mild gum bleeding.  Eating eggs in bed. No other complaints or signs of bleeding. plt 33k today.    PAST MEDICAL & SURGICAL HISTORY:  Intraventricular block    Actinic keratosis    Aortic valve stenosis    Back pain    Hard of hearing    Subdural hematoma    Dry eyes    Fatigue    HLD (hyperlipidemia)    HTN (hypertension)    GERD (gastroesophageal reflux disease)    Syncope and collapse    Dehydration    Hypothyroid    MR (mitral regurgitation)    Nocturia    Urine incontinence    Osteoarthritis of back    H/O brain surgery  subdural  bleed,  had  chris holes    Bilateral cataracts    History of tonsillectomy    History of cholecystectomy    H/O hand surgery        FAMILY HISTORY:  Family history of heart attack (Father)        VITAL SIGNS:  T(F): 99.9 (04-25-22 @ 07:09)  HR: 88 (04-25-22 @ 07:09)  BP: 94/54 (04-25-22 @ 07:09)  RR: 18 (04-25-22 @ 07:09)  SpO2: 93% (04-25-22 @ 07:09)  Wt(kg): --    PHYSICAL EXAM:    Constitutional: NAD  ENT- thrush, +bleeding on side of left cheek, erythema at roof of mouth  Respiratory: CTA b/l,   Cardiovascular: RRR,   Gastrointestinal: soft, NTND,  Extremities: no c/c/e  Neurological: AAOx3      MEDICATIONS  (STANDING):  amLODIPine   Tablet 5 milliGRAM(s) Oral daily  cefTRIAXone   IVPB 1000 milliGRAM(s) IV Intermittent every 24 hours  cyanocobalamin 1000 MICROGram(s) Oral daily  folic acid 1 milliGRAM(s) Oral daily  hydrochlorothiazide 12.5 milliGRAM(s) Oral daily  levothyroxine 100 MICROGram(s) Oral daily  metoprolol succinate ER 25 milliGRAM(s) Oral daily  mupirocin 2% Ointment 1 Application(s) Both Nostrils every 12 hours  nystatin    Suspension [id number] Unit(s) Swish and Swallow four times a day  pantoprazole    Tablet 40 milliGRAM(s) Oral before breakfast  potassium phosphate / sodium phosphate Powder (PHOS-NaK) 1 Packet(s) Oral three times a day with meals    MEDICATIONS  (PRN):  acetaminophen     Tablet .. 650 milliGRAM(s) Oral every 6 hours PRN Temp greater or equal to 38C (100.4F), Mild Pain (1 - 3)  aluminum hydroxide/magnesium hydroxide/simethicone Suspension 30 milliLiter(s) Oral every 4 hours PRN Dyspepsia  FIRST- Mouthwash  BLM 10 milliLiter(s) Swish and Spit four times a day PRN Mouth Care  melatonin 3 milliGRAM(s) Oral at bedtime PRN Insomnia  ondansetron Injectable 4 milliGRAM(s) IV Push every 8 hours PRN Nausea and/or Vomiting      Allergies    No Known Allergies    Intolerances    aspirin (Other)      LABS:                        8.6    3.76  )-----------( 33       ( 25 Apr 2022 06:56 )             25.6     04-24    135  |  105  |  22  ----------------------------<  98  3.9   |  23  |  0.82    Ca    8.7      24 Apr 2022 07:53  Phos  2.2     04-24  Mg     2.2     04-24            RADIOLOGY & ADDITIONAL TESTS:  Studies reviewed.

## 2022-04-25 NOTE — PROGRESS NOTE ADULT - ASSESSMENT
MEDICATIONS  (STANDING):  amLODIPine   Tablet 5 milliGRAM(s) Oral daily  cefTRIAXone   IVPB 1000 milliGRAM(s) IV Intermittent every 24 hours  cyanocobalamin 1000 MICROGram(s) Oral daily  folic acid 1 milliGRAM(s) Oral daily  hydrochlorothiazide 12.5 milliGRAM(s) Oral daily  levothyroxine 100 MICROGram(s) Oral daily  metoprolol succinate ER 25 milliGRAM(s) Oral daily  mupirocin 2% Ointment 1 Application(s) Both Nostrils every 12 hours  nystatin    Suspension [id number] Unit(s) Swish and Swallow four times a day  pantoprazole    Tablet 40 milliGRAM(s) Oral before breakfast  potassium phosphate / sodium phosphate Powder (PHOS-NaK) 1 Packet(s) Oral three times a day with meals    MEDICATIONS  (PRN):  acetaminophen     Tablet .. 650 milliGRAM(s) Oral every 6 hours PRN Temp greater or equal to 38C (100.4F), Mild Pain (1 - 3)  aluminum hydroxide/magnesium hydroxide/simethicone Suspension 30 milliLiter(s) Oral every 4 hours PRN Dyspepsia  FIRST- Mouthwash  BLM 10 milliLiter(s) Swish and Spit four times a day PRN Mouth Care  melatonin 3 milliGRAM(s) Oral at bedtime PRN Insomnia  ondansetron Injectable 4 milliGRAM(s) IV Push every 8 hours PRN Nausea and/or Vomiting      ASSESSMENT/PLAN:    #Syncope/Near Syncope  -Telemetry. PPM interrogated->no acute events  -orthostatics-> negative  -Troponin negative  -no focal deficits  -CT head negative for acute process  -TTE with known history of AS. Reorder. Pending  -Now asymptomatic  -PT evaluation    #Fever without SIRS  -Unclear source  -Blood NGTD. Urine CLX > 3 organisms  -Start empiric ABX (4/24). ID consulted.   -Possible autoimmune process.     #Thrombocytopenia  #Anemia. Acute/Chronic Blood loss augmented with underlying Thrombocytopenia. Multiple locations, nose bleeds, peripheral, hemorrhoidal.  -New onset thrombocytopenia.  -Dimer elevated but below age adjusted threshold. T bili WNL.   -PT/PTT WNL  -Plt down to 9K (4/24) with BRBPR on same day. s/p 1 unit of platelets (4/24)  -Continue to monitor counts and transfuse accordinlgy.   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 Unclear source. Also with worsening platelets with intermittent bleeding. Continue to monitor Plt/Hgb. Hematology/ID evaluation. Patient verbalized desire of not wanting aggressive measures and wanting to go home. Palliative care evaluation.

## 2022-04-25 NOTE — PHYSICAL THERAPY INITIAL EVALUATION ADULT - PLANNED THERAPY INTERVENTIONS, PT EVAL
GOAL: Pt will perform 4 stairs with or without U HR as needed within 4weeks./balance training/bed mobility training/gait training/strengthening/transfer training

## 2022-04-25 NOTE — PHYSICAL THERAPY INITIAL EVALUATION ADULT - GENERAL OBSERVATIONS, REHAB EVAL
Pt seen for 45min PT Eval. Pt rec'd semi supine in bed in NAD, +tele, CT Head stable SDH. Pt trans supine>sit with min Ax1. Pt trans sit<>Stand with CGA at RW. pt amb 50ft with RW and CGA. Pt dem dec step length, dec endurance, and dec carolee. Pt performed seated therex, radha well. Pt left sitting in chair all needs met, +chair alarm, KURT Rasmussen aware.

## 2022-04-25 NOTE — PROGRESS NOTE ADULT - ASSESSMENT
89 F hx osteoarthritis, mitral regurgitation, hypothyroid, syncope, GERD, RA  HTN, HLD, aortic valve stenosis admitted on 4/22 for evaluation of syncope at PMD office while following up after being seen by dentist for oral pain; the dentist started valtrex for oral ulcers, the patient had syncope in dentist office as well. Patient complains of overall not feeling well, the worst complaint is oral pain. History per medical record, of note there is intermittent fevers.     1. 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 other issues: per medicine

## 2022-04-25 NOTE — PROGRESS NOTE ADULT - SUBJECTIVE AND OBJECTIVE BOX
Date of service: 04-25-22 @ 15:11      Patient lying in bed; has oral pain, no active bleeding      ROS: unable to obtain secondary to patient medical condition     MEDICATIONS  (STANDING):  amLODIPine   Tablet 5 milliGRAM(s) Oral daily  cefTRIAXone   IVPB 1000 milliGRAM(s) IV Intermittent every 24 hours  cyanocobalamin 1000 MICROGram(s) Oral daily  folic acid 1 milliGRAM(s) Oral daily  hydrochlorothiazide 12.5 milliGRAM(s) Oral daily  levothyroxine 100 MICROGram(s) Oral daily  metoprolol succinate ER 25 milliGRAM(s) Oral daily  mupirocin 2% Ointment 1 Application(s) Both Nostrils every 12 hours  nystatin    Suspension [id number] Unit(s) Swish and Swallow four times a day  pantoprazole    Tablet 40 milliGRAM(s) Oral before breakfast  potassium phosphate / sodium phosphate Powder (PHOS-NaK) 1 Packet(s) Oral three times a day with meals    MEDICATIONS  (PRN):  acetaminophen     Tablet .. 650 milliGRAM(s) Oral every 6 hours PRN Temp greater or equal to 38C (100.4F), Mild Pain (1 - 3)  aluminum hydroxide/magnesium hydroxide/simethicone Suspension 30 milliLiter(s) Oral every 4 hours PRN Dyspepsia  FIRST- Mouthwash  BLM 10 milliLiter(s) Swish and Spit four times a day PRN Mouth Care  melatonin 3 milliGRAM(s) Oral at bedtime PRN Insomnia  ondansetron Injectable 4 milliGRAM(s) IV Push every 8 hours PRN Nausea and/or Vomiting      Vital Signs Last 24 Hrs  T(C): 37.7 (25 Apr 2022 07:09), Max: 37.7 (24 Apr 2022 15:37)  T(F): 99.9 (25 Apr 2022 07:09), Max: 99.9 (24 Apr 2022 15:37)  HR: 88 (25 Apr 2022 07:09) (88 - 96)  BP: 94/54 (25 Apr 2022 07:09) (94/54 - 125/64)  BP(mean): 62 (25 Apr 2022 07:09) (62 - 62)  RR: 18 (25 Apr 2022 07:09) (18 - 19)  SpO2: 93% (25 Apr 2022 07:09) (93% - 100%)        Physical Exam:        Constitutional: frail looking  HEENT: NC/AT, EOMI, PERRLA, conjunctivae clear; ears and nose atraumatic; pharynx with petechiae on hard palate, yellow tongue  Neck: supple; thyroid not palpable  Back: no tenderness  Respiratory: respiratory effort normal; clear to auscultation  Cardiovascular: S1S2 regular, no murmurs  Abdomen: soft, not tender, not distended, positive BS; no liver or spleen organomegaly  Genitourinary: no suprapubic tenderness  Musculoskeletal: no muscle tenderness, no joint swelling or tenderness  Neurological/ Psychiatric: AxOx3, judgement and insight normal;  moving all extremities  Skin: no rashes; no palpable lesions    Labs: all available labs reviewed                         Labs:                        8.6    3.76  )-----------( 33       ( 25 Apr 2022 06:56 )             25.6     04-24    135  |  105  |  22  ----------------------------<  98  3.9   |  23  |  0.82    Ca    8.7      24 Apr 2022 07:53  Phos  2.2     04-24  Mg     2.2     04-24    TPro  5.4<L>  /  Alb  x   /  TBili  x   /  DBili  x   /  AST  x   /  ALT  x   /  AlkPhos  x   04-24           Cultures:       Babesia microti PCR, Blood. (collected 04-24-22 @ 15:45)  Source: .Blood None  Final Report (04-25-22 @ 01:10):    Babesia microti PCR    Results: NOT detected    ***************Result Note*************    The detection of Babesia microti by PCR has only been    validated for whole blood; this test has not been approved    by the US Food and Drug Administration (FDA). Performance    characteristics of this assay have been determined by    [facility]. The clinical significance    of results should be considered in conjunction with the    overall clinical presentation of the patient. Result is not    intended to be used as the sole means for clinical diagnosis    or patient management decisions.    One negative sample does not necessarily rule    out the presence of a parasitic infection.    Culture - Urine (collected 04-22-22 @ 15:28)  Source: Clean Catch None  Final Report (04-24-22 @ 08:09):    >=3 organisms. Probable collection contamination.    Culture - Blood (collected 04-22-22 @ 13:18)  Source: .Blood Blood-Peripheral  Preliminary Report (04-24-22 @ 01:13):    No growth to date.      Ferritin, Serum: 491 ng/mL (04-24-22 @ 15:45)  D-Dimer Assay, Quantitative: 795 ng/mL DDU (04-23-22 @ 13:03)        Borrelia burgdorferi IgG/IgM Antibodies (04.24.22 @ 15:45)    LYME IgG/IgM Antibodies Result: 0.33 Index    Lyme C6 Interpretation: Negative: A negative result may occur in patients recently (< 14 days) infected  with Borrelia burgdorferi. If early Lyme disease is suspected, consider  collecting a new sample 2 – 4 weeks later for repeat testing.  Reference Range: (expressed as Index values)  < 0.90             Negative  0.90 - 1.09      Equivocal  >= 1.10           Positive  CDC/ASTPHLD Guidelines recommend that all samples judged equivocal or  positive be re-tested by confirmatory immunoassays.  Method: Chemiluminescent Immunoassay                < from: CT Head No Cont (04.22.22 @ 13:56) >    ACC: [id number] EXAM:  CT BRAIN                          PROCEDURE DATE:  04/22/2022          INTERPRETATION:  Clinical indication: Syncope. No older.    Multiple axial sections were performed from base skull to vertex without   contrast enhancement.Coronal and sagittal destructions were performed as   well    This exam is compared prior noncontrast head CT performed on March 4, 2015.    Parenchymal volume loss and chronic microvascular ischemic changes are   identified. These findings have increased when compared with the prior   exam.    There is no evidence of acute hemorrhage mass or mass effect seen.    Previously noted right frontal subdural hematoma is no longer appreciated.    Evaluation of the osseous structures with the appropriate window appears   normal    High right frontal and parietal chris hole is no longer seen.    The visualized paranasal sinuses mastoid and middle ear regions appears   clear.    IMPRESSION: Previously noted right frontal subdural hematoma is no longer  seen.    < end of copied text >        Radiology: all available radiological tests reviewed    Advanced directives addressed: full resuscitation

## 2022-04-25 NOTE — PHYSICAL THERAPY INITIAL EVALUATION ADULT - PRECAUTIONS/LIMITATIONS, REHAB EVAL
CT HEAD: Previously noted right frontal subdural hematoma is no longer seen.; CTAP: No acute pathology./fall precautions

## 2022-04-25 NOTE — PHYSICAL THERAPY INITIAL EVALUATION ADULT - STANDING BALANCE: STATIC
How Severe Is Your Skin Lesion?: mild Is This A New Presentation, Or A Follow-Up?: Skin Lesion fair plus

## 2022-04-25 NOTE — PHYSICAL THERAPY INITIAL EVALUATION ADULT - DISCHARGE DISPOSITION, PT EVAL
subacute rehab. If pt DC home pt will require assist for all functional mobility and home PT for progressive strengthening, gait and balance training, and pt/family education./home/home w/ assist/home w/ home PT/rehabilitation facility

## 2022-04-25 NOTE — CONSULT NOTE ADULT - CONVERSATION DETAILS
We discussed Palliative Care team being a supportive team when a patient has ongoing illnesses.  We also discussed that it is not an end of life care service, but can help navigate symptoms and emotional support throughout their hospital stay here.     Hospice was explained as well as an end of life care philosophy. When a disease cannot be cured, or family/patient decide the treatment burdens out weight the risk and chose to change focus of treatment from cure to quality/comfort.        pt has DNR DNI Molst already in place.    She states that she does not want to keep coming back and fourth to the hospital.   She feels at 89 y/o she would like to live out the rest of her life at home w/ hospice if possible and allow nature to take its course. Her quality of life has declined significantly and she's open to considering hospice at home.      I reached out to her daughter who will call us back.

## 2022-04-26 LAB
A PHAGOCYTOPH DNA BLD QL NAA+PROBE: NEGATIVE — SIGNIFICANT CHANGE UP
ANION GAP SERPL CALC-SCNC: 8 MMOL/L — SIGNIFICANT CHANGE UP (ref 5–17)
APTT BLD: 21.6 SEC — LOW (ref 27.5–35.5)
BASOPHILS # BLD AUTO: 0 K/UL — SIGNIFICANT CHANGE UP (ref 0–0.2)
BASOPHILS # BLD AUTO: 0.01 K/UL — SIGNIFICANT CHANGE UP (ref 0–0.2)
BASOPHILS NFR BLD AUTO: 0 % — SIGNIFICANT CHANGE UP (ref 0–2)
BASOPHILS NFR BLD AUTO: 0.4 % — SIGNIFICANT CHANGE UP (ref 0–2)
BUN SERPL-MCNC: 17 MG/DL — SIGNIFICANT CHANGE UP (ref 7–23)
CALCIUM SERPL-MCNC: 8.4 MG/DL — LOW (ref 8.5–10.1)
CHLORIDE SERPL-SCNC: 103 MMOL/L — SIGNIFICANT CHANGE UP (ref 96–108)
CO2 SERPL-SCNC: 26 MMOL/L — SIGNIFICANT CHANGE UP (ref 22–31)
CREAT SERPL-MCNC: 0.87 MG/DL — SIGNIFICANT CHANGE UP (ref 0.5–1.3)
DRVVT SCREEN TO CONFIRM RATIO: SIGNIFICANT CHANGE UP
E CHAFFEENSIS DNA BLD QL NAA+PROBE: NEGATIVE — SIGNIFICANT CHANGE UP
E EWINGII DNA SPEC QL NAA+PROBE: NEGATIVE — SIGNIFICANT CHANGE UP
EGFR: 63 ML/MIN/1.73M2 — SIGNIFICANT CHANGE UP
EHRLICHIA DNA SPEC QL NAA+PROBE: NEGATIVE — SIGNIFICANT CHANGE UP
EOSINOPHIL # BLD AUTO: 0.13 K/UL — SIGNIFICANT CHANGE UP (ref 0–0.5)
EOSINOPHIL # BLD AUTO: 0.21 K/UL — SIGNIFICANT CHANGE UP (ref 0–0.5)
EOSINOPHIL NFR BLD AUTO: 5.3 % — SIGNIFICANT CHANGE UP (ref 0–6)
EOSINOPHIL NFR BLD AUTO: 7.8 % — HIGH (ref 0–6)
FIBRINOGEN PPP-MCNC: 940 MG/DL — HIGH (ref 330–520)
GLUCOSE SERPL-MCNC: 99 MG/DL — SIGNIFICANT CHANGE UP (ref 70–99)
HCT VFR BLD CALC: 24.6 % — LOW (ref 34.5–45)
HCT VFR BLD CALC: 27.8 % — LOW (ref 34.5–45)
HGB BLD-MCNC: 8.2 G/DL — LOW (ref 11.5–15.5)
HGB BLD-MCNC: 9.2 G/DL — LOW (ref 11.5–15.5)
IMM GRANULOCYTES NFR BLD AUTO: 0.4 % — SIGNIFICANT CHANGE UP (ref 0–1.5)
IMM GRANULOCYTES NFR BLD AUTO: 0.4 % — SIGNIFICANT CHANGE UP (ref 0–1.5)
INR BLD: 1 RATIO — SIGNIFICANT CHANGE UP (ref 0.88–1.16)
LA NT DPL PPP QL: 40.4 SEC — SIGNIFICANT CHANGE UP
LYMPHOCYTES # BLD AUTO: 0.59 K/UL — LOW (ref 1–3.3)
LYMPHOCYTES # BLD AUTO: 0.77 K/UL — LOW (ref 1–3.3)
LYMPHOCYTES # BLD AUTO: 24.2 % — SIGNIFICANT CHANGE UP (ref 13–44)
LYMPHOCYTES # BLD AUTO: 28.7 % — SIGNIFICANT CHANGE UP (ref 13–44)
MAGNESIUM SERPL-MCNC: 2.1 MG/DL — SIGNIFICANT CHANGE UP (ref 1.6–2.6)
MANUAL SMEAR VERIFICATION: SIGNIFICANT CHANGE UP
MCHC RBC-ENTMCNC: 30.6 PG — SIGNIFICANT CHANGE UP (ref 27–34)
MCHC RBC-ENTMCNC: 30.8 PG — SIGNIFICANT CHANGE UP (ref 27–34)
MCHC RBC-ENTMCNC: 33.1 GM/DL — SIGNIFICANT CHANGE UP (ref 32–36)
MCHC RBC-ENTMCNC: 33.3 GM/DL — SIGNIFICANT CHANGE UP (ref 32–36)
MCV RBC AUTO: 91.8 FL — SIGNIFICANT CHANGE UP (ref 80–100)
MCV RBC AUTO: 93 FL — SIGNIFICANT CHANGE UP (ref 80–100)
MONOCYTES # BLD AUTO: 0.11 K/UL — SIGNIFICANT CHANGE UP (ref 0–0.9)
MONOCYTES # BLD AUTO: 0.14 K/UL — SIGNIFICANT CHANGE UP (ref 0–0.9)
MONOCYTES NFR BLD AUTO: 4.5 % — SIGNIFICANT CHANGE UP (ref 2–14)
MONOCYTES NFR BLD AUTO: 5.2 % — SIGNIFICANT CHANGE UP (ref 2–14)
NEUTROPHILS # BLD AUTO: 1.54 K/UL — LOW (ref 1.8–7.4)
NEUTROPHILS # BLD AUTO: 1.6 K/UL — LOW (ref 1.8–7.4)
NEUTROPHILS NFR BLD AUTO: 57.5 % — SIGNIFICANT CHANGE UP (ref 43–77)
NEUTROPHILS NFR BLD AUTO: 65.6 % — SIGNIFICANT CHANGE UP (ref 43–77)
NORMALIZED SCT PPP-RTO: 0.96 RATIO — SIGNIFICANT CHANGE UP (ref 0–1.16)
NORMALIZED SCT PPP-RTO: SIGNIFICANT CHANGE UP
PHOSPHATE SERPL-MCNC: 2.4 MG/DL — LOW (ref 2.5–4.5)
PLAT MORPH BLD: NORMAL — SIGNIFICANT CHANGE UP
PLATELET # BLD AUTO: 21 K/UL — LOW (ref 150–400)
PLATELET # BLD AUTO: 23 K/UL — LOW (ref 150–400)
POTASSIUM SERPL-MCNC: 3.7 MMOL/L — SIGNIFICANT CHANGE UP (ref 3.5–5.3)
POTASSIUM SERPL-SCNC: 3.7 MMOL/L — SIGNIFICANT CHANGE UP (ref 3.5–5.3)
PROTHROM AB SERPL-ACNC: 11.6 SEC — SIGNIFICANT CHANGE UP (ref 10.5–13.4)
RBC # BLD: 2.68 M/UL — LOW (ref 3.8–5.2)
RBC # BLD: 2.99 M/UL — LOW (ref 3.8–5.2)
RBC # FLD: 13.9 % — SIGNIFICANT CHANGE UP (ref 10.3–14.5)
RBC # FLD: 14 % — SIGNIFICANT CHANGE UP (ref 10.3–14.5)
RBC BLD AUTO: NORMAL — SIGNIFICANT CHANGE UP
SODIUM SERPL-SCNC: 137 MMOL/L — SIGNIFICANT CHANGE UP (ref 135–145)
WBC # BLD: 2.44 K/UL — LOW (ref 3.8–10.5)
WBC # BLD: 2.68 K/UL — LOW (ref 3.8–10.5)
WBC # FLD AUTO: 2.44 K/UL — LOW (ref 3.8–10.5)
WBC # FLD AUTO: 2.68 K/UL — LOW (ref 3.8–10.5)

## 2022-04-26 PROCEDURE — 99232 SBSQ HOSP IP/OBS MODERATE 35: CPT

## 2022-04-26 PROCEDURE — 99497 ADVNCD CARE PLAN 30 MIN: CPT

## 2022-04-26 PROCEDURE — 99233 SBSQ HOSP IP/OBS HIGH 50: CPT

## 2022-04-26 RX ORDER — NYSTATIN CREAM 100000 [USP'U]/G
1 CREAM TOPICAL
Refills: 0 | Status: DISCONTINUED | OUTPATIENT
Start: 2022-04-26 | End: 2022-04-27

## 2022-04-26 RX ADMIN — Medication 500000 UNIT(S): at 11:17

## 2022-04-26 RX ADMIN — Medication 500000 UNIT(S): at 05:24

## 2022-04-26 RX ADMIN — MUPIROCIN 1 APPLICATION(S): 20 OINTMENT TOPICAL at 10:54

## 2022-04-26 RX ADMIN — Medication 12.5 MILLIGRAM(S): at 10:53

## 2022-04-26 RX ADMIN — Medication 500000 UNIT(S): at 23:26

## 2022-04-26 RX ADMIN — Medication 25 MILLIGRAM(S): at 10:54

## 2022-04-26 RX ADMIN — Medication 1 PACKET(S): at 10:52

## 2022-04-26 RX ADMIN — PANTOPRAZOLE SODIUM 40 MILLIGRAM(S): 20 TABLET, DELAYED RELEASE ORAL at 05:24

## 2022-04-26 RX ADMIN — Medication 500000 UNIT(S): at 17:49

## 2022-04-26 RX ADMIN — PREGABALIN 1000 MICROGRAM(S): 225 CAPSULE ORAL at 11:17

## 2022-04-26 RX ADMIN — Medication 1 MILLIGRAM(S): at 11:17

## 2022-04-26 RX ADMIN — MUPIROCIN 1 APPLICATION(S): 20 OINTMENT TOPICAL at 21:20

## 2022-04-26 RX ADMIN — Medication 100 MICROGRAM(S): at 05:25

## 2022-04-26 RX ADMIN — AMLODIPINE BESYLATE 5 MILLIGRAM(S): 2.5 TABLET ORAL at 10:53

## 2022-04-26 RX ADMIN — Medication 650 MILLIGRAM(S): at 10:57

## 2022-04-26 RX ADMIN — NYSTATIN CREAM 1 APPLICATION(S): 100000 CREAM TOPICAL at 21:20

## 2022-04-26 NOTE — PROGRESS NOTE ADULT - SUBJECTIVE AND OBJECTIVE BOX
INTERVAL HPI/OVERNIGHT EVENTS:  Patient S&E at bedside. No o/n events, states that she wants "to go home and is ready to be taken from this earth." Pt denies any pain, mild bleeding in mouth but improved today. Blood counts reviewed and WBC 2.44, Hb 8.2, plt 23k today.     PAST MEDICAL & SURGICAL HISTORY:  Intraventricular block    Actinic keratosis    Aortic valve stenosis    Back pain    Hard of hearing    Subdural hematoma    Dry eyes    Fatigue    HLD (hyperlipidemia)    HTN (hypertension)    GERD (gastroesophageal reflux disease)    Syncope and collapse    Dehydration    Hypothyroid    MR (mitral regurgitation)    Nocturia    Urine incontinence    Osteoarthritis of back    H/O brain surgery  subdural  bleed,  had  chris holes    Bilateral cataracts    History of tonsillectomy    History of cholecystectomy    H/O hand surgery        FAMILY HISTORY:  Family history of heart attack (Father)        VITAL SIGNS:  T(F): 100.3 (04-26-22 @ 09:05)  HR: 80 (04-26-22 @ 09:00)  BP: 110/50 (04-26-22 @ 09:00)  RR: 18 (04-26-22 @ 09:00)  SpO2: 100% (04-26-22 @ 09:00)  Wt(kg): --    PHYSICAL EXAM:    Constitutional: NAD  ENT- thrush, +bleeding on side of left cheek, erythema at roof of mouth but no ulcers seen  Respiratory: CTA b/l,   Cardiovascular: RRR,   Gastrointestinal: soft, NTND,  Extremities: no c/c/e  Neurological: AAOx3      MEDICATIONS  (STANDING):  amLODIPine   Tablet 5 milliGRAM(s) Oral daily  cyanocobalamin 1000 MICROGram(s) Oral daily  folic acid 1 milliGRAM(s) Oral daily  hydrochlorothiazide 12.5 milliGRAM(s) Oral daily  levothyroxine 100 MICROGram(s) Oral daily  metoprolol succinate ER 25 milliGRAM(s) Oral daily  mupirocin 2% Ointment 1 Application(s) Both Nostrils every 12 hours  nystatin    Suspension [id number] Unit(s) Swish and Swallow four times a day  pantoprazole    Tablet 40 milliGRAM(s) Oral before breakfast  potassium phosphate / sodium phosphate Powder (PHOS-NaK) 1 Packet(s) Oral three times a day with meals    MEDICATIONS  (PRN):  acetaminophen     Tablet .. 650 milliGRAM(s) Oral every 6 hours PRN Temp greater or equal to 38C (100.4F), Mild Pain (1 - 3)  aluminum hydroxide/magnesium hydroxide/simethicone Suspension 30 milliLiter(s) Oral every 4 hours PRN Dyspepsia  FIRST- Mouthwash  BLM 10 milliLiter(s) Swish and Spit four times a day PRN Mouth Care  melatonin 3 milliGRAM(s) Oral at bedtime PRN Insomnia  ondansetron Injectable 4 milliGRAM(s) IV Push every 8 hours PRN Nausea and/or Vomiting      Allergies    No Known Allergies    Intolerances    aspirin (Other)      LABS:                        8.2    2.44  )-----------( 23       ( 26 Apr 2022 06:31 )             24.6     04-26    137  |  103  |  17  ----------------------------<  99  3.7   |  26  |  0.87    Ca    8.4<L>      26 Apr 2022 06:31  Phos  2.4     04-26  Mg     2.1     04-26    TPro  5.4<L>  /  Alb  x   /  TBili  x   /  DBili  x   /  AST  x   /  ALT  x   /  AlkPhos  x   04-24          RADIOLOGY & ADDITIONAL TESTS:  Studies reviewed.

## 2022-04-26 NOTE — GOALS OF CARE CONVERSATION - ADVANCED CARE PLANNING - TREATMENT GUIDELINE COMMENT
Due to the pandemic and visitation restrictions conversation was held over the phone with pts daughter/surrogate. Team did also speak with pt.

## 2022-04-26 NOTE — PROGRESS NOTE ADULT - ASSESSMENT
89 year old woman with HTN, HLD, aortic stenosis, mitral regurgitation, hypothyroidism, GERD, osteoarthritis and rheumatoid arthritis, recent oral ulcer for which she was placed on Valtrex, presented for further evaluation after an episode of near syncope.     Near Syncope  PPM interrogated->no acute events. Orthostatics-> negative. Troponin negative. No focal deficits. CT head negative for acute process. TTE with known history of AS. F/u TTE. PT evaluation    Thrombocytopenia, anemia  Nose bleeds, hemorrhoidal bleeding. Dimer elevated but below age adjusted threshold. T bili WNL. PT/PTT WNL. Hematology following. Peripheral blood smear unrevealing. 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     RA  Holding MTX in the setting of thrombocytopenia and signs of immunocompromised state with mucositis and trush    Oral thrush  Appears resolving. Nystatin/Magic Mouth Wash    Rash under breasts B/L  Likely moisture associated dermatitis, possible cutaneous candidiasis. Started nystatin powder.     HTN  BP controlled. On Norvasc. HCTZ. Toprol XL.         Dispo planning   89 year old woman with HTN, HLD, aortic stenosis, mitral regurgitation, hypothyroidism, GERD, osteoarthritis and rheumatoid arthritis, recent oral ulcer for which she was placed on Valtrex, presented for further evaluation after an episode of near syncope.     Near Syncope  PPM interrogated->no acute events. Orthostatics-> negative. Troponin negative. No focal deficits. CT head negative for acute process. TTE done. LV with an intracavity gradient suggestive of an LVOT obstruction is noted and is increased by valsalva. Severe mitral stenosis also seen. Patient not interested in evaluation for a possible intervention, wishes for home with S hospice.     Thrombocytopenia, anemia  Nose bleeds, hemorrhoidal bleeding. Dimer elevated but below age adjusted threshold. T bili WNL. PT/PTT WNL. Hematology following. Peripheral blood smear unrevealing. 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     RA  Holding MTX in the setting of thrombocytopenia and signs of immunocompromised state with mucositis and thrush, now improved though.    Oral thrush  Appears resolving. Nystatin/Magic Mouth Wash    Rash under breasts B/L  Likely moisture associated dermatitis, possible cutaneous candidiasis. Started nystatin powder.     HTN  BP controlled. On Norvasc. HCTZ. Toprol XL.         Dispo planning - home with S hospice   89 year old woman with HTN, HLD, aortic stenosis, mitral regurgitation, hypothyroidism, GERD, osteoarthritis and rheumatoid arthritis, recent oral ulcer for which she was placed on Valtrex, presented for further evaluation after an episode of near syncope.     Near Syncope  PPM interrogated->no acute events. Orthostatics-> negative. Troponin negative. No focal deficits. CT head negative for acute process. TTE done. LV with an intracavity gradient suggestive of an LVOT obstruction is noted and is increased by valsalva. Severe mitral stenosis also seen. Patient not interested in evaluation for a possible intervention, wishes for home with S hospice.     Pancytopenia  Nose bleeds, hemorrhoidal bleeding, since resolved. PT/PTT WNL. Dimer elevated but below age adjusted threshold. Hematology following. No signs or symptoms of hemolysis or MAHA- LDH normal, nl Bili, stable Cr. Peripheral blood smear with 1-2 platelets / hpf; giant platelets, no clumping, no schistocytes, no immature forms, no blasts. 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 MTX on hold. CT Abd / Pelvis with contrast done, negative. ID following. Lyme negative, babesia negative. CMV and EBV testing requested. Flow cytometry requested. Bone marrow biopsy would be indicated if in keeping with patient care goals. Will discuss with her and family.     RA  Holding MTX in the setting of thrombocytopenia and signs of immunocompromised state with mucositis and thrush, now improved though.    Oral thrush  Appears resolving. Nystatin/Magic Mouth Wash    Rash under breasts B/L  Likely moisture associated dermatitis, possible cutaneous candidiasis. Started nystatin powder.     HTN  BP controlled. On Norvasc. HCTZ. Toprol XL.         Dispo planning - home with S hospice

## 2022-04-26 NOTE — PROGRESS NOTE ADULT - PROVIDER SPECIALTY LIST ADULT
Heme/Onc
Hospitalist
Hospitalist
Infectious Disease
Heme/Onc
Hospitalist
Hospitalist
Palliative Care

## 2022-04-26 NOTE — PROGRESS NOTE ADULT - SUBJECTIVE AND OBJECTIVE BOX
CHIEF COMPLAINT: Near syncope, oral pain    INTERVAL HISTORY: Patient reports feeling overall improved. No longer with oral discomfort. Tolerating PO better. No fever, chills, dizziness, chest pain, SOB, nausea, vomiting. Remains thrombocytopenic, stable. Hematology Oncology following.     ROS: 14 Point review of systems reviewed and reported as negative except for some rash underneath her breasts B/L and physical deconditioning.     VITALS:  T(F): 100.3 (26 Apr 2022 09:05), Max: 100.3 (26 Apr 2022 09:05)  HR: 80 (26 Apr 2022 09:00) (80 - 105)  BP: 110/50 (26 Apr 2022 09:00) (110/50 - 119/52)  RR: 18 (26 Apr 2022 09:00) (16 - 18)  SpO2: 100% (26 Apr 2022 09:00) (96% - 100%)    PHYSICAL EXAM:  General: AAOx3; NAD  HEENT: NCAT PERRL EOMI  Neck: Non-tender; No JVD  CVS: S1&S2 normal, RRR  Chest: Lungs CTA B/L; Normal Respiratory Effort  Abdomen: Soft, non-tender, non-distended, no guarding, no rebound, normal bowel sounds  Extremities: No cyanosis, No clubbing, No edema  Skin: Clean, Dry and Intact; Petechiae  Neuro: AAOx3, CNII-XII grossly intact, non-focal  Psych: Appropriate, Cooperative,    LABS:                                9.2    2.68 )----------( 21              27.8       137  |  103  |  17  ---------------------<  99  3.7   |  26  |  0.87    Ca 8.4  Phos 2.4  Mg 2.1    PT: 11.6 sec;   INR: 1.00 ratio    PTT:21.6 sec    MICRO:  Babesia microti PCR 4/24: Negative  Urine Cx 4/22: >=3 organisms. Probable collection contamination.  COVID-19 PCR 4/22: Negative    RADIOLOGY:  CT Head 4/22  Previously noted right frontal subdural hematoma is no longer seen.    CT Abdomen and Pelvis w/ IV Cont 4/24  No acute pathology.    MEDS:  MEDICATIONS  (STANDING):  amLODIPine   Tablet 5 milliGRAM(s) Oral daily  cyanocobalamin 1000 MICROGram(s) Oral daily  folic acid 1 milliGRAM(s) Oral daily  hydrochlorothiazide 12.5 milliGRAM(s) Oral daily  levothyroxine 100 MICROGram(s) Oral daily  metoprolol succinate ER 25 milliGRAM(s) Oral daily  mupirocin 2% Ointment 1 Application(s) Both Nostrils every 12 hours  nystatin    Suspension 520081 Unit(s) Swish and Swallow four times a day  nystatin Powder 1 Application(s) Topical two times a day  pantoprazole    Tablet 40 milliGRAM(s) Oral before breakfast    MEDICATIONS  (PRN):  acetaminophen     Tablet .. 650 milliGRAM(s) Oral every 6 hours PRN Temp greater or equal to 38C (100.4F), Mild Pain (1 - 3)  aluminum hydroxide/magnesium hydroxide/simethicone Suspension 30 milliLiter(s) Oral every 4 hours PRN Dyspepsia  FIRST- Mouthwash  BLM 10 milliLiter(s) Swish and Spit four times a day PRN Mouth Care  melatonin 3 milliGRAM(s) Oral at bedtime PRN Insomnia  ondansetron Injectable 4 milliGRAM(s) IV Push every 8 hours PRN Nausea and/or Vomiting   CHIEF COMPLAINT: Near syncope, oral pain    INTERVAL HISTORY: Patient reports feeling overall improved. No longer with oral discomfort. Tolerating PO better. No fever, chills, dizziness, chest pain, SOB, nausea, vomiting. Remains thrombocytopenic, stable. Hematology Oncology following.     ROS: 14 Point review of systems reviewed and reported as negative except for some rash underneath her breasts B/L and physical deconditioning.     VITALS:  T(F): 100.3 (26 Apr 2022 09:05), Max: 100.3 (26 Apr 2022 09:05)  HR: 80 (26 Apr 2022 09:00) (80 - 105)  BP: 110/50 (26 Apr 2022 09:00) (110/50 - 119/52)  RR: 18 (26 Apr 2022 09:00) (16 - 18)  SpO2: 100% (26 Apr 2022 09:00) (96% - 100%)    PHYSICAL EXAM:  General: AAOx3; NAD  HEENT: NCAT PERRL EOMI  Neck: Non-tender; No JVD  CVS: S1&S2 normal, RRR  Chest: Lungs CTA B/L; Normal Respiratory Effort  Abdomen: Soft, non-tender, non-distended, no guarding, no rebound, normal bowel sounds  Extremities: No cyanosis, No clubbing, No edema  Skin: Clean, Dry and Intact; Petechiae  Neuro: AAOx3, CNII-XII grossly intact, non-focal  Psych: Appropriate, Cooperative,    LABS:                                9.2    2.68 )----------( 21              27.8       137  |  103  |  17  ---------------------<  99  3.7   |  26  |  0.87    Ca 8.4  Phos 2.4  Mg 2.1    PT: 11.6 sec;   INR: 1.00 ratio    PTT:21.6 sec    MICRO:  Babesia microti PCR 4/24: Negative  Urine Cx 4/22: >=3 organisms. Probable collection contamination.  COVID-19 PCR 4/22: Negative    RADIOLOGY:  CT Head 4/22  Previously noted right frontal subdural hematoma is no longer seen.    CT Abdomen and Pelvis w/ IV Cont 4/24  No acute pathology.    CARDIAC TESTING:  TTE 4/25:  Well seated prosthetic valve in the aortic position. Mean trans-prosthetic gradient is within normal limitations for this type of prosthesis. The left atrium appears normal. Mild concentric left ventricular hypertrophy is present. An intracavity gradient suggestive of an LVOT obstruction is noted and is increased by valsalva. Estimated left ventricular ejection fraction is 65 %. The IVC appears normal. Severe mitral annular calcification is present. There is thickening of mitral valve. The leaflet opening is restricted. Mean transmitral gradient is 11 mmHg; this finding is consistent with severe mitral stenosis. Chordal JOSHUA is noted. Moderate (2+) mitral regurgitation is present. EA reversal of the mitral inflow consistent with reduced compliance of the left ventricle. No evidence of pericardial effusion. No evidence of pleural effusion. Pulmonic valve not well seen. Mild pulmonic valvular regurgitation (1+) is present. Normal appearing right atrium. A device wire is seen in the RV and RA. Normal appearing right ventricle structure and function. The tricuspid valve leaflets appear mildly thickened but open well. Mild (1+) tricuspid valve regurgitation is present. Mild pulmonary hypertension.    MEDS:  MEDICATIONS  (STANDING):  amLODIPine   Tablet 5 milliGRAM(s) Oral daily  cyanocobalamin 1000 MICROGram(s) Oral daily  folic acid 1 milliGRAM(s) Oral daily  hydrochlorothiazide 12.5 milliGRAM(s) Oral daily  levothyroxine 100 MICROGram(s) Oral daily  metoprolol succinate ER 25 milliGRAM(s) Oral daily  mupirocin 2% Ointment 1 Application(s) Both Nostrils every 12 hours  nystatin    Suspension [id number] Unit(s) Swish and Swallow four times a day  nystatin Powder 1 Application(s) Topical two times a day  pantoprazole    Tablet 40 milliGRAM(s) Oral before breakfast    MEDICATIONS  (PRN):  acetaminophen     Tablet .. 650 milliGRAM(s) Oral every 6 hours PRN Temp greater or equal to 38C (100.4F), Mild Pain (1 - 3)  aluminum hydroxide/magnesium hydroxide/simethicone Suspension 30 milliLiter(s) Oral every 4 hours PRN Dyspepsia  FIRST- Mouthwash  BLM 10 milliLiter(s) Swish and Spit four times a day PRN Mouth Care  melatonin 3 milliGRAM(s) Oral at bedtime PRN Insomnia  ondansetron Injectable 4 milliGRAM(s) IV Push every 8 hours PRN Nausea and/or Vomiting   CHIEF COMPLAINT: Near syncope, oral pain    INTERVAL HISTORY: Patient reports feeling overall improved. No longer with oral discomfort. Tolerating PO better. No fever, chills, dizziness, chest pain, SOB, nausea, vomiting. Remains thrombocytopenic, stable. Hematology Oncology following.     ROS: 14 Point review of systems reviewed and reported as negative except for some rash underneath her breasts B/L and physical deconditioning.     VITALS:  T(F): 100.3 (26 Apr 2022 09:05), Max: 100.3 (26 Apr 2022 09:05)  HR: 80 (26 Apr 2022 09:00) (80 - 105)  BP: 110/50 (26 Apr 2022 09:00) (110/50 - 119/52)  RR: 18 (26 Apr 2022 09:00) (16 - 18)  SpO2: 100% (26 Apr 2022 09:00) (96% - 100%)    PHYSICAL EXAM:  General: AAOx3; NAD  HEENT: NCAT PERRL EOMI  Neck: Non-tender; No JVD  CVS: S1&S2 normal, RRR  Chest: Lungs CTA B/L; Normal Respiratory Effort  Abdomen: Soft, non-tender, non-distended, no guarding, no rebound, normal bowel sounds  Extremities: No cyanosis, No clubbing, No edema  Skin: Clean, Dry and Intact; Petechiae  Neuro: AAOx3, CNII-XII grossly intact, non-focal  Psych: Appropriate, Cooperative,    LABS:                                9.2    2.68 )----------( 21              27.8       137  |  103  |  17  ---------------------<  99  3.7   |  26  |  0.87    Ca 8.4  Phos 2.4  Mg 2.1    PT: 11.6 sec;   INR: 1.00 ratio    PTT:21.6 sec    Flow cytometry requested    MICRO:  CMV PCR requested  EBV testing requested  Babesia microti PCR 4/24: Negative  Urine Cx 4/22: >=3 organisms. Probable collection contamination.  COVID-19 PCR 4/22: Negative    RADIOLOGY:  CT Head 4/22  Previously noted right frontal subdural hematoma is no longer seen.    CT Abdomen and Pelvis w/ IV Cont 4/24  No acute pathology.    CARDIAC TESTING:  TTE 4/25:  Well seated prosthetic valve in the aortic position. Mean trans-prosthetic gradient is within normal limitations for this type of prosthesis. The left atrium appears normal. Mild concentric left ventricular hypertrophy is present. An intracavity gradient suggestive of an LVOT obstruction is noted and is increased by valsalva. Estimated left ventricular ejection fraction is 65 %. The IVC appears normal. Severe mitral annular calcification is present. There is thickening of mitral valve. The leaflet opening is restricted. Mean transmitral gradient is 11 mmHg; this finding is consistent with severe mitral stenosis. Chordal JOSHUA is noted. Moderate (2+) mitral regurgitation is present. EA reversal of the mitral inflow consistent with reduced compliance of the left ventricle. No evidence of pericardial effusion. No evidence of pleural effusion. Pulmonic valve not well seen. Mild pulmonic valvular regurgitation (1+) is present. Normal appearing right atrium. A device wire is seen in the RV and RA. Normal appearing right ventricle structure and function. The tricuspid valve leaflets appear mildly thickened but open well. Mild (1+) tricuspid valve regurgitation is present. Mild pulmonary hypertension.    MEDS:  MEDICATIONS  (STANDING):  amLODIPine   Tablet 5 milliGRAM(s) Oral daily  cyanocobalamin 1000 MICROGram(s) Oral daily  folic acid 1 milliGRAM(s) Oral daily  hydrochlorothiazide 12.5 milliGRAM(s) Oral daily  levothyroxine 100 MICROGram(s) Oral daily  metoprolol succinate ER 25 milliGRAM(s) Oral daily  mupirocin 2% Ointment 1 Application(s) Both Nostrils every 12 hours  nystatin    Suspension [id number] Unit(s) Swish and Swallow four times a day  nystatin Powder 1 Application(s) Topical two times a day  pantoprazole    Tablet 40 milliGRAM(s) Oral before breakfast    MEDICATIONS  (PRN):  acetaminophen     Tablet .. 650 milliGRAM(s) Oral every 6 hours PRN Temp greater or equal to 38C (100.4F), Mild Pain (1 - 3)  aluminum hydroxide/magnesium hydroxide/simethicone Suspension 30 milliLiter(s) Oral every 4 hours PRN Dyspepsia  FIRST- Mouthwash  BLM 10 milliLiter(s) Swish and Spit four times a day PRN Mouth Care  melatonin 3 milliGRAM(s) Oral at bedtime PRN Insomnia  ondansetron Injectable 4 milliGRAM(s) IV Push every 8 hours PRN Nausea and/or Vomiting

## 2022-04-26 NOTE — GOALS OF CARE CONVERSATION - ADVANCED CARE PLANNING - CONVERSATION DETAILS
HPI:  89 F hx osteoarthritis, mitral regurgitation, hypothyroid, syncope, GERD, RA  HTN, HLD, aortic valve stenosis here s/p syncopal episode. pt woke up today feeling generally unwell and has been having severe pain in her mouth due to oral lesions. (22 Apr 2022 15:09)      PERTINENT PMH REVIEWED:  [ X ] YES [ ] NO           Mental Status: [ ] Alert  [  ] Oriented [  ] Confused [  ] Lethargic [  ]  Concerns of Depression [  ]- pt. denies feeling depressed, just reports being in the hospital in general can be depressing   Anxiety [   ]- denies feeling anxious  Baseline ADLs (prior to admission):  Independent [ X ] moderately [ ] fully   Dependent   [ ] moderately [ ]fully    Anticipated Grief: Patient [ X ] Family [ X ]    Caregiver [place] Assessed: Yes [ X  ] No [  ]    [employer]ian: Jewish    Spiritual Concerns: Not identified     Goals of Care: Comfort [ X  ] Rehabilitation [  ] Curative [  ] Life Prolonging [  ]    Previous services: None    ADVANCE DIRECTIVES:  DNR/DNI    Anticipated D/C Plan: Home with [facility]                     Summary:    This SW met with pt. at bedside to follow up and offer support. Role of Palliative  explained. Pt. shared that she was walking with a cane prior to admission and living with her daughter. She reports that at this point in her life she just wants to return home and be comfortable. Pt. expressed the only thing that makes her depressed is being in the hospital other wise she normally is not sad or anxious. Feelings explored and support provided. Pt. reports she spoke with Dr. An about hospice at home and that this is what she wants as she no longer wants to keep coming back and forth to the hospital. Pt. gave team permission to speak with her daughter Dominga.    Team spoke with pts daughter via phone to discuss goals of care, assist with planning and provide supportive counseling. Pts daughter agreed that pt. just wants to be comfortable. She discussed being open to pt. having some non invasive interventions if they were not complicated. We discussed home hospice services and philosophy of care. Pts daughter at first was interested in receiving info from hospice however, a little while after our conversation she contacted our team back and reports that she wants to respect her mothers wishes and they are on board with plan for home hospice. They would like referral placed to S for home hospice. Floor SW [facility] aware and will place referral.     Plan at this time is for home with [facility] upon d/c. Emotional support provided. Our team will continue to follow.

## 2022-04-26 NOTE — PROGRESS NOTE ADULT - ASSESSMENT
89 F hx osteoarthritis, mitral regurgitation, hypothyroid, syncope, GERD, RA  HTN, HLD, aortic valve stenosis here s/p syncopal episode. pt woke up today feeling generally unwell and has been having severe pain in her mouth due to oral lesions.    Process of Care  --Reviewed dx/treatment problems and alignment with Goals of Care    syncope/Near Syncope  Telemetry. PPM interrogated->no acute events  orthostatics-> negative  troponin negativeCT   head negative for acute process  TTE with known history of AS. Reorder. Pending    Fever without SIRS  Unclear source  Blood NGTD. Urine CLX > 3 organisms  Start empiric ABX (4/24). ID consulted.   Possible autoimmune process.     Thrombocytopenia  Anemia. Acute  Chronic Blood loss augmented with underlying Thrombocytopenia.   Multiple locations, nose bleeds, peripheral, hemorrhoidal.    RA on MTX  Hold MTX in the setting of thrombocytopenia and signs of immunocompromised state with mucositis and trush    Thrush ? mucositis  Nystatin/Magic Mouth Wash    HTN  Home medications    Physical Aspects of Care  --Pain  patient denies at this time  c/w current management  magic mouth wash    --Bowel Regimen  denies constipation  risk for constipation d/t immobility  daily dulcolax    --Dyspnea  No SOB at this time  comfortable and in NAD    --Nausea Vomiting  denies    --Weakness  PT as tolerated     Psychological and Psychiatric Aspects of Care:   Grief Bereavement emotional support provided  --Hx of psychiatric dx: none  -Pastoral Care Available PRN     Social Aspects of Care  -SW involved     Cultural Aspects  -Primary Language: English    Goals of Care:     Again,     Hospice was explained as well  as an end of life care philosophy.  When a disease cannot be cured, or family/patient decide the treatment burdens out weigh the risk and one choses to change focus of treatment from cure to quality/comfort.     Patient and daughter agree on hospice consult and home DC w/ hospice.  Patient doesnt want further workup up-  and at this time daughter is in agreement to support patient and her wishes back at home.    Spent 30 minutes disucssing goc face to face w patient  case d/w team and sw      Ethical and Legal Aspects:   NA    Capacity: pt with capacity  HCP/ Surrogate: Daughter   Code Status:  DNR DNI  MOLST: dnr dni  Dispo Plan: hospice referral for home hospice   Discussed With: Case coordinated with attending and SW and RN     Time Spent: 45 minutes including the care, coordination and counseling of this patient, 50% of which was spent coordinating and counseling.

## 2022-04-26 NOTE — PROGRESS NOTE ADULT - SUBJECTIVE AND OBJECTIVE BOX
HPI:   89 F hx osteoarthritis, mitral regurgitation, hypothyroid, syncope, GERD, RA  HTN, HLD, aortic valve stenosis here s/p syncopal episode. pt woke up today feeling generally unwell and has been having severe pain in her mouth due to oral lesions. pt went to see dentist 2 days ago and was started on Valtrex for possible viral infection. pt was following up with Dr. Flores today and after examination was sitting on a chair when daughter noticed her head went forward. pt's top denture  fell out and she was drooling and pt was unresponsive for a few minutes and then pt seemed to wake up. pt with some difficulty with speech at first but that has self-resolved. pt taken by ambulance to the ER for further evaluation. pt denies fever, chills, cough, shortness of breath, chest pain prior to episode of present in the ER. pt has a [email or web address] PPM. also of note, pt in  pt s/p fall and had a brain bleed. Pt back to baseline.    22   pt seen and examined  feeling weak, w/ mouth sores and pain   states she does not have sob, nausea or vomiting         pt in NAD comfortable denies pain today  is hoping to leave hospital soon and does not want any further workups.   She asked me to call her daughter and further discuss hospice as the patient feels this is more inline w/ her wishes.      PAIN: (  )Yes   ( x)No     DYSPNEA: ( ) Yes  (x ) No  Level:        Review of Systems:     Anxiety- denies  Depression-denies  Physical Discomfort- in NAD  Dyspnea-denies  Constipation-denies  Diarrhea-denies  Nausea-denies  Vomiting-denies  Anorexia-denies  Weight Loss- denies  Cough-denies  Secretions-denies  Fatigue-denies  Weakness-denies  Delirium-denies    All other systems reviewed and negative       PHYSICAL EXAM:    Vital Signs Last 24 Hrs  T(C): 37.9 (2022 09:05), Max: 37.9 (2022 09:00)  T(F): 100.3 (2022 09:05), Max: 100.3 (2022 09:05)  HR: 80 (2022 09:00) (80 - 105)  BP: 110/50 (2022 09:00) (110/50 - 119/52)  BP(mean): --  RR: 18 (2022 09:00) (16 - 18)  SpO2: 100% (2022 09:00) (96% - 100%)  Daily     Daily Weight in k (2022 06:46)      PPSV2:   45%  FAST: na    General: nad  Mental Status: a+Ox3 in NAD   HEENT: eomi  Lungs: ctabl no wheezing or rales  Cardiac: s1s2  GI: soft nontender +BS without distention  : voids  Ext: moves all extremities   Neuro: a+Ox3 follows commands         LABS:                        8.2    2.44  )-----------( 23       ( 2022 06:31 )             24.6         137  |  103  |  17  ----------------------------<  99  3.7   |  26  |  0.87    Ca    8.4<L>      2022 06:31  Phos  2.4       Mg     2.1         TPro  5.4<L>  /  Alb  x   /  TBili  x   /  DBili  x   /  AST  x   /  ALT  x   /  AlkPhos  x   24      Albumin: Albumin, Serum: 3.6 g/dL ( @ 13:14)      Allergies    No Known Allergies    Intolerances    aspirin (Other)    MEDICATIONS  (STANDING):  amLODIPine   Tablet 5 milliGRAM(s) Oral daily  cyanocobalamin 1000 MICROGram(s) Oral daily  folic acid 1 milliGRAM(s) Oral daily  hydrochlorothiazide 12.5 milliGRAM(s) Oral daily  levothyroxine 100 MICROGram(s) Oral daily  metoprolol succinate ER 25 milliGRAM(s) Oral daily  mupirocin 2% Ointment 1 Application(s) Both Nostrils every 12 hours  nystatin    Suspension 727016 Unit(s) Swish and Swallow four times a day  pantoprazole    Tablet 40 milliGRAM(s) Oral before breakfast    MEDICATIONS  (PRN):  acetaminophen     Tablet .. 650 milliGRAM(s) Oral every 6 hours PRN Temp greater or equal to 38C (100.4F), Mild Pain (1 - 3)  aluminum hydroxide/magnesium hydroxide/simethicone Suspension 30 milliLiter(s) Oral every 4 hours PRN Dyspepsia  FIRST- Mouthwash  BLM 10 milliLiter(s) Swish and Spit four times a day PRN Mouth Care  melatonin 3 milliGRAM(s) Oral at bedtime PRN Insomnia  ondansetron Injectable 4 milliGRAM(s) IV Push every 8 hours PRN Nausea and/or Vomiting      RADIOLOGY/ADDITIONAL STUDIES:

## 2022-04-26 NOTE — PROGRESS NOTE ADULT - REASON FOR ADMISSION
Near syncope

## 2022-04-26 NOTE — PROGRESS NOTE ADULT - ASSESSMENT
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 Nava   - follow cultures- UCx likely contaminant- empiric Abx w/ ceftriaxone- now off  - can send viral panel including EBV, CMV to r/o viral syndrome   - I would recommend bone marrow biopsy now that patient is pancytopenic  - can send flow cytometry as well   - will need to discuss with family about aggressive measures and if they and pt would consent to bone marrow biopsy procedure- pt may want to take more conservative approach based off conversation with palliative care yesterday and comments this am  - if agreeable would transfuse 1u plts prior to procedure or if <20k later today    h/o SDH- from fall   - repeat CT head - negative for bleed     Anemia of chronic ds  - decline since admit likely dilutional   - Iron , B12, folate WNL   - Retic, LDH wnl       Dr. Hardeep Aj  cell: [phone]  Weekends and nights please call [phone] for MD on call  [facility]  Hematology/Oncology

## 2022-04-27 ENCOUNTER — TRANSCRIPTION ENCOUNTER (OUTPATIENT)
Age: 87
End: 2022-04-27

## 2022-04-27 VITALS
OXYGEN SATURATION: 99 % | HEART RATE: 88 BPM | TEMPERATURE: 99 F | RESPIRATION RATE: 18 BRPM | DIASTOLIC BLOOD PRESSURE: 57 MMHG | SYSTOLIC BLOOD PRESSURE: 118 MMHG

## 2022-04-27 PROCEDURE — 99239 HOSP IP/OBS DSCHRG MGMT >30: CPT

## 2022-04-27 RX ORDER — DIPHENHYDRAMINE HYDROCHLORIDE AND LIDOCAINE HYDROCHLORIDE AND ALUMINUM HYDROXIDE AND MAGNESIUM HYDRO
1 KIT
Qty: 0 | Refills: 0 | DISCHARGE
Start: 2022-04-27

## 2022-04-27 RX ORDER — NYSTATIN 500MM UNIT
5 POWDER (EA) MISCELLANEOUS
Qty: 105 | Refills: 0
Start: 2022-04-27 | End: 2022-05-03

## 2022-04-27 RX ORDER — METOPROLOL TARTRATE 50 MG
1 TABLET ORAL
Qty: 0 | Refills: 0 | DISCHARGE
Start: 2022-04-27

## 2022-04-27 RX ORDER — CHLORHEXIDINE GLUCONATE 213 G/1000ML
1 SOLUTION TOPICAL
Refills: 0 | Status: DISCONTINUED | OUTPATIENT
Start: 2022-04-27 | End: 2022-04-27

## 2022-04-27 RX ORDER — METHOTREXATE 2.5 MG/1
5 TABLET ORAL
Qty: 0 | Refills: 0 | DISCHARGE

## 2022-04-27 RX ORDER — NYSTATIN CREAM 100000 [USP'U]/G
1 CREAM TOPICAL
Qty: 28 | Refills: 0
Start: 2022-04-27 | End: 2022-05-10

## 2022-04-27 RX ADMIN — AMLODIPINE BESYLATE 5 MILLIGRAM(S): 2.5 TABLET ORAL at 10:47

## 2022-04-27 RX ADMIN — Medication 500000 UNIT(S): at 05:54

## 2022-04-27 RX ADMIN — Medication 25 MILLIGRAM(S): at 10:47

## 2022-04-27 RX ADMIN — PREGABALIN 1000 MICROGRAM(S): 225 CAPSULE ORAL at 12:53

## 2022-04-27 RX ADMIN — Medication 100 MICROGRAM(S): at 05:53

## 2022-04-27 RX ADMIN — NYSTATIN CREAM 1 APPLICATION(S): 100000 CREAM TOPICAL at 10:48

## 2022-04-27 RX ADMIN — PANTOPRAZOLE SODIUM 40 MILLIGRAM(S): 20 TABLET, DELAYED RELEASE ORAL at 05:53

## 2022-04-27 RX ADMIN — Medication 12.5 MILLIGRAM(S): at 10:47

## 2022-04-27 RX ADMIN — Medication 1 MILLIGRAM(S): at 12:53

## 2022-04-27 RX ADMIN — Medication 500000 UNIT(S): at 12:53

## 2022-04-27 RX ADMIN — MUPIROCIN 1 APPLICATION(S): 20 OINTMENT TOPICAL at 10:48

## 2022-04-27 NOTE — DISCHARGE NOTE PROVIDER - NSDCCPCAREPLAN_GEN_ALL_CORE_FT
PRINCIPAL DISCHARGE DIAGNOSIS  Diagnosis: Near syncope  Assessment and Plan of Treatment: You were admitted to the hospital for near syncope. Cause may be multifactorial. Maybe vaso-vagal from pain related to oral discomfort (see below). Alternatively, could have been due to severe mitral stenosis and/or left ventricular outflow tract obstruction increased by valsalva, as seen on echocardiogram. Pacemaker interrogated, no arryhtmia events. Orthostatics were negative. Troponin negative and EKG negative for heart ischemia. No neurological deficits to suggest stroke.  CT head negative for acute findings. Discussed with patient and family regarding cardiac findings and patient would like to defer invasinve procedures, will be medically managed for the mitral disease and left ventrical outflow tract findings. You should increase your activity gradually as tolerated. You may be prone to lightheadedness, dizziness, or even passing out with valsalva (bearing down, straining, etc).      SECONDARY DISCHARGE DIAGNOSES  Diagnosis: Pancytopenia  Assessment and Plan of Treatment: This admission you were found to have low platelet counts as well as reduced hemoglobin and white blood cell count, together called pancytopenia. The cause remains unclear despite your extensive workup. A viral illness remains possible and would likely resolve on its own. Could also be related to methotrexate so this was held. a malignant process is also possible however patient would like to defer interventions such as bone marrow biopsy, defer flow cytometry etc since she would not be a good candidate for or be interested in receiving treatment (such as chemo) if a hematologic malignancy were found.    Diagnosis: Oral pain  Assessment and Plan of Treatment: Appears to be a mucositis. Also may have had some candidiasis but not currently seen. Treated with oral nystatin and also supportive care measures such as magic mouthwash. Continue nystatin oral medication for another week, magic mouthwash as needed. Additional pain control measures if needed as per [facility] who will assume care once youre home.    Diagnosis: Rash  Assessment and Plan of Treatment: You were found to have a rash underneath both breasts in the breast folds, likely moisture and contact associated dermatitis but also may be skin candidiasis. You were prescribed nystatin powder to apply twice a day. Keep area dry as best as can be.

## 2022-04-27 NOTE — DISCHARGE NOTE PROVIDER - CARE PROVIDER_API CALL
Abram Ramos)  Family Medicine; Geriatric Medicine  [address] [place] [address]  Phone: [phone]  Fax: [phone]  Established Patient  Follow Up Time: 2 weeks    [facility] HOSPICE CARE TEAM,   Please follow up with your hospice care team. Disucss with them any symptoms that might be affecting you. Have them review your medications. You may be able to reduce some of the medications (such as vitals) to reduce pill burden to improve your overall comfort and quality of life.  Phone: (   )    -  Fax: (   )    -  Follow Up Time:

## 2022-04-27 NOTE — DISCHARGE NOTE PROVIDER - NSDCMRMEDTOKEN_GEN_ALL_CORE_FT
amLODIPine 5 mg oral tablet: 1 tab(s) orally once a day  cyanocobalamin 500 mcg oral tablet: 1 tab(s) orally once a day  diphenhydramine/lidocaine/aluminum hydroxide/magnesium hydroxide/simethicone mucous membrane suspension: 1 dose(s) mucous membrane every 6 hours, as needed for oral pain  folic acid 1 mg oral tablet: 1 tab(s) orally once a day  hydroCHLOROthiazide 12.5 mg oral capsule: 1 cap(s) orally once a day  metoprolol succinate 25 mg oral tablet, extended release: 1 tab(s) orally once a day  nystatin 100,000 units/g topical powder: 1 application topically 2 times a day, apply under both breasts in the fold.   nystatin 100,000 units/mL oral suspension: 5 milliliter(s) orally 3 times a day   omeprazole 20 mg oral delayed release capsule: 1 cap(s) orally 2 times a day  Synthroid 100 mcg (0.1 mg) oral tablet: 1 tab(s) orally once a day  Vitamin D3 25 mcg (1000 intl units) oral tablet: 5 tab(s) orally once a day

## 2022-04-27 NOTE — DISCHARGE NOTE PROVIDER - PROVIDER TOKENS
PROVIDER:[TOKEN:[[id number]],FOLLOWUP:[2 weeks],ESTABLISHEDPATIENT:[T]],FREE:[LAST:[[facility] HOSPICE CARE TEAM],PHONE:[(   )    -],FAX:[(   )    -],ADDRESS:[Please follow up with your hospice care team. Disucss with them any symptoms that might be affecting you. Have them review your medications. You may be able to reduce some of the medications (such as vitals) to reduce pill burden to improve your overall comfort and quality of life.]]

## 2022-04-27 NOTE — DISCHARGE NOTE PROVIDER - HOSPITAL COURSE
89 year old woman with HTN, HLD, aortic stenosis, mitral regurgitation, hypothyroidism, GERD, osteoarthritis and rheumatoid arthritis, recent oral ulcer for which she was placed on Valtrex, presented for further evaluation after an episode of near syncope.     Near Syncope  PPM interrogated->no acute events. Orthostatics-> negative. Troponin negative. No focal deficits. CT head negative for acute process. TTE done. LV with an intracavity gradient suggestive of an LVOT obstruction is noted and is increased by valsalva. Severe mitral stenosis also seen. Patient not interested in evaluation for a possible intervention, wishes for home with [facility] hosp[facility].     Pancytopenia  Nose bleeds, hemorrhoidal bleeding, since resolved. PT/PTT WNL. Dimer elevated but below age adjusted threshold. Hematology following. No signs or symptoms of hemolysis or MAHA- LDH normal, nl Bili, stable Cr. Peripheral blood smear with 1-2 platelets / hpf; giant platelets, no clumping, no schistocytes, no immature forms, no blasts. 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 MTX on hold. CT Abd / Pelvis with contrast done, negative. ID following. Lyme negative, babesia negative. CMV and EBV testing requested. Flow cytometry requested. Bone marrow biopsy would be indicated if in keeping with patient care goals. Will discuss with her and family.     RA  Holding MTX in the setting of thrombocytopenia and signs of immunocompromised state with mucositis and thrush, now improved though.    Oral thrush  Appears resolving. Nystatin/Magic Mouth Wash    Rash under breasts B/L  Likely moisture associated dermatitis, possible cutaneous candidiasis. Started nystatin powder.     HTN  BP controlled. On Norvasc. HCTZ. Toprol XL.       PHYSICAL EXAM:  Afebrile /53  HR 90  RR 18  O2 97 % on room air  General: AAOx3; NAD  HEENT: NCAT PERRL EOMI  Neck: Non-tender; No JVD  CVS: S1&S2 normal, RRR  Chest: Lungs CTA B/L; Normal Respiratory Effort  Abdomen: Soft, non-tender, non-distended, no guarding, no rebound, normal bowel sounds  Extremities: No cyanosis, No clubbing, No edema  Skin: Clean, Dry and Intact; Petechiae  Neuro: AAOx3, CNII-XII grossly intact, non-focal  Psych: Appropriate, Cooperative,    LABS:                                9.2    2.68 )----------( 21              27.8       137  |  103  |  17  ---------------------<  99  3.7   |  26  |  0.87    Ca 8.4  Phos 2.4  Mg 2.1    PT: 11.6 sec;   INR: 1.00 ratio    PTT:21.6 sec    Flow cytometry requested    MICRO:  CMV and EBV testing offered, declined  Babesia microti PCR 4/24: Negative  Urine Cx 4/22: >=3 organisms. Probable collection contamination.  COVID-19 PCR 4/22: Negative    RADIOLOGY:  CT Head 4/22:   Previously noted right frontal subdural hematoma is no longer seen.    CT Abdomen and Pelvis w/ IV Cont 4/24:   No acute pathology.    CARDIAC TESTING:  TTE 4/25:  Well seated prosthetic valve in the aortic position. Mean trans-prosthetic gradient is within normal limitations for this type of prosthesis. The left atrium appears normal. Mild concentric left ventricular hypertrophy is present. An intracavity gradient suggestive of an LVOT obstruction is noted and is increased by valsalva. Estimated left ventricular ejection fraction is 65 %. The IVC appears normal. Severe mitral annular calcification is present. There is thickening of mitral valve. The leaflet opening is restricted. Mean transmitral gradient is 11 mmHg; this finding is consistent with severe mitral stenosis. Chordal JOSHUA is noted. Moderate (2+) mitral regurgitation is present. EA reversal of the mitral inflow consistent with reduced compliance of the left ventricle. No evidence of pericardial effusion. No evidence of pleural effusion. Pulmonic valve not well seen. Mild pulmonic valvular regurgitation (1+) is present. Normal appearing right atrium. A device wire is seen in the RV and RA. Normal appearing right ventricle structure and function. The tricuspid valve leaflets appear mildly thickened but open well. Mild (1+) tricuspid valve regurgitation is present. Mild pulmonary hypertension.

## 2022-04-27 NOTE — DISCHARGE NOTE NURSING/CASE MANAGEMENT/SOCIAL WORK - PATIENT PORTAL LINK FT
You can access the FollowMyHealth Patient Portal offered by [facility] by registering at the following website: [email or web address]. By joining [facility]’s FollowMyHealth portal, you will also be able to view your health information using other applications (apps) compatible with our system.

## 2022-04-27 NOTE — DISCHARGE NOTE NURSING/CASE MANAGEMENT/SOCIAL WORK - NSDCPEFALRISK_GEN_ALL_CORE
For information on Fall & Injury Prevention, visit: [email or web address] OR  [email or web address] OR  https://www.cdc.gov/steadi/patient.html

## 2022-04-27 NOTE — DISCHARGE NOTE PROVIDER - NSDCCAREPROVSEEN_GEN_ALL_CORE_FT
Hardeep Aj (Hematology Oncology)  Zion Bell (Hospital Medicine)  Josue Mancilla (Palliative Care)  Donta Evans (Hospital Medicine)  Dai Chen (Neurology)  Muna Nava (Infectious Diseases)  Susan Gtz (Palliative Care)

## 2022-04-28 LAB
% ALBUMIN: 52.8 % — SIGNIFICANT CHANGE UP
% ALPHA 1: 8.8 % — SIGNIFICANT CHANGE UP
% ALPHA 2: 13.2 % — SIGNIFICANT CHANGE UP
% BETA: 14.2 % — SIGNIFICANT CHANGE UP
% GAMMA: 11 % — SIGNIFICANT CHANGE UP
% M SPIKE: 3.8 % — SIGNIFICANT CHANGE UP
ALBUMIN SERPL ELPH-MCNC: 2.9 G/DL — LOW (ref 3.6–5.5)
ALBUMIN/GLOB SERPL ELPH: 1.2 RATIO — SIGNIFICANT CHANGE UP
ALPHA1 GLOB SERPL ELPH-MCNC: 0.5 G/DL — HIGH (ref 0.1–0.4)
ALPHA2 GLOB SERPL ELPH-MCNC: 0.7 G/DL — SIGNIFICANT CHANGE UP (ref 0.5–1)
B-GLOBULIN SERPL ELPH-MCNC: 0.8 G/DL — SIGNIFICANT CHANGE UP (ref 0.5–1)
CULTURE RESULTS: SIGNIFICANT CHANGE UP
GAMMA GLOBULIN: 0.6 G/DL — SIGNIFICANT CHANGE UP (ref 0.6–1.6)
M-SPIKE: 0.2 G/DL — HIGH (ref 0–0)
PROT PATTERN SERPL ELPH-IMP: SIGNIFICANT CHANGE UP
SPECIMEN SOURCE: SIGNIFICANT CHANGE UP

## 2022-05-03 DIAGNOSIS — B00.9 HERPESVIRAL INFECTION, UNSPECIFIED: ICD-10-CM

## 2022-05-03 DIAGNOSIS — M19.90 UNSPECIFIED OSTEOARTHRITIS, UNSPECIFIED SITE: ICD-10-CM

## 2022-05-03 DIAGNOSIS — I45.10 UNSPECIFIED RIGHT BUNDLE-BRANCH BLOCK: ICD-10-CM

## 2022-05-03 DIAGNOSIS — I10 ESSENTIAL (PRIMARY) HYPERTENSION: ICD-10-CM

## 2022-05-03 DIAGNOSIS — L30.8 OTHER SPECIFIED DERMATITIS: ICD-10-CM

## 2022-05-03 DIAGNOSIS — Z90.49 ACQUIRED ABSENCE OF OTHER SPECIFIED PARTS OF DIGESTIVE TRACT: ICD-10-CM

## 2022-05-03 DIAGNOSIS — D61.818 OTHER PANCYTOPENIA: ICD-10-CM

## 2022-05-03 DIAGNOSIS — K21.9 GASTRO-ESOPHAGEAL REFLUX DISEASE WITHOUT ESOPHAGITIS: ICD-10-CM

## 2022-05-03 DIAGNOSIS — D69.6 THROMBOCYTOPENIA, UNSPECIFIED: ICD-10-CM

## 2022-05-03 DIAGNOSIS — D69.3 IMMUNE THROMBOCYTOPENIC PURPURA: ICD-10-CM

## 2022-05-03 DIAGNOSIS — I27.20 PULMONARY HYPERTENSION, UNSPECIFIED: ICD-10-CM

## 2022-05-03 DIAGNOSIS — E03.9 HYPOTHYROIDISM, UNSPECIFIED: ICD-10-CM

## 2022-05-03 DIAGNOSIS — G47.00 INSOMNIA, UNSPECIFIED: ICD-10-CM

## 2022-05-03 DIAGNOSIS — Z95.2 PRESENCE OF PROSTHETIC HEART VALVE: ICD-10-CM

## 2022-05-03 DIAGNOSIS — R50.9 FEVER, UNSPECIFIED: ICD-10-CM

## 2022-05-03 DIAGNOSIS — D50.0 IRON DEFICIENCY ANEMIA SECONDARY TO BLOOD LOSS (CHRONIC): ICD-10-CM

## 2022-05-03 DIAGNOSIS — M06.9 RHEUMATOID ARTHRITIS, UNSPECIFIED: ICD-10-CM

## 2022-05-03 DIAGNOSIS — R55 SYNCOPE AND COLLAPSE: ICD-10-CM

## 2022-05-03 DIAGNOSIS — H04.123 DRY EYE SYNDROME OF BILATERAL LACRIMAL GLANDS: ICD-10-CM

## 2022-05-03 DIAGNOSIS — I25.2 OLD MYOCARDIAL INFARCTION: ICD-10-CM

## 2022-05-03 DIAGNOSIS — B37.0 CANDIDAL STOMATITIS: ICD-10-CM

## 2022-05-03 DIAGNOSIS — B37.2 CANDIDIASIS OF SKIN AND NAIL: ICD-10-CM

## 2022-06-08 ENCOUNTER — APPOINTMENT (OUTPATIENT)
Dept: ELECTROPHYSIOLOGY | Facility: CLINIC | Age: 87
End: 2022-06-08
Payer: OTHER MISCELLANEOUS

## 2022-06-09 ENCOUNTER — NON-APPOINTMENT (OUTPATIENT)
Age: 87
End: 2022-06-09

## 2022-06-09 PROCEDURE — 93296 REM INTERROG EVL PM/IDS: CPT

## 2022-06-09 PROCEDURE — 93294 REM INTERROG EVL PM/LDLS PM: CPT

## 2022-08-14 NOTE — PATIENT PROFILE ADULT. - PASSIVE COMMENT
Encounter addended by: Dorothy Last RN on: 1/19/2018  3:18 PM<BR>     Actions taken: Flowsheet accepted
Father smoked, work environment
[facility] [place]  Internal Medicine  [address] [place] [address]  Phone: [phone]  Fax: [phone]

## 2022-08-16 ENCOUNTER — APPOINTMENT (OUTPATIENT)
Dept: CARDIOLOGY | Facility: CLINIC | Age: 87
End: 2022-08-16

## 2022-09-08 ENCOUNTER — APPOINTMENT (OUTPATIENT)
Dept: ELECTROPHYSIOLOGY | Facility: CLINIC | Age: 87
End: 2022-09-08

## 2022-09-14 ENCOUNTER — APPOINTMENT (OUTPATIENT)
Dept: ELECTROPHYSIOLOGY | Facility: CLINIC | Age: 87
End: 2022-09-14

## 2022-09-15 ENCOUNTER — NON-APPOINTMENT (OUTPATIENT)
Age: 87
End: 2022-09-15

## 2022-09-15 PROCEDURE — 93294 REM INTERROG EVL PM/LDLS PM: CPT

## 2022-09-15 PROCEDURE — 93296 REM INTERROG EVL PM/IDS: CPT

## 2022-09-15 NOTE — ED ADULT NURSE NOTE - NS ED NURSE RECORD ANOTHER VITAL SIGN
Pt gave permission to speak with sister Harriet who lives in [place].  Provided Harriet with an update and all questions answered.  Her phone number is [phone].   Yes

## 2022-10-05 ENCOUNTER — INPATIENT (INPATIENT)
Facility: HOSPITAL | Age: 87
LOS: 1 days | Discharge: HOME HEALTH | DRG: 812 | End: 2022-10-07
Attending: FAMILY MEDICINE | Admitting: HOSPITALIST
Payer: MEDICARE

## 2022-10-05 VITALS — HEIGHT: 62 IN | WEIGHT: 138.89 LBS

## 2022-10-05 DIAGNOSIS — D64.9 ANEMIA, UNSPECIFIED: ICD-10-CM

## 2022-10-05 DIAGNOSIS — Z90.49 ACQUIRED ABSENCE OF OTHER SPECIFIED PARTS OF DIGESTIVE TRACT: Chronic | ICD-10-CM

## 2022-10-05 DIAGNOSIS — H26.9 UNSPECIFIED CATARACT: Chronic | ICD-10-CM

## 2022-10-05 DIAGNOSIS — Z90.89 ACQUIRED ABSENCE OF OTHER ORGANS: Chronic | ICD-10-CM

## 2022-10-05 DIAGNOSIS — Z98.890 OTHER SPECIFIED POSTPROCEDURAL STATES: Chronic | ICD-10-CM

## 2022-10-05 LAB
ALBUMIN SERPL ELPH-MCNC: 2.8 G/DL — LOW (ref 3.3–5)
ALP SERPL-CCNC: 92 U/L — SIGNIFICANT CHANGE UP (ref 40–120)
ALT FLD-CCNC: 13 U/L — SIGNIFICANT CHANGE UP (ref 12–78)
ANION GAP SERPL CALC-SCNC: 5 MMOL/L — SIGNIFICANT CHANGE UP (ref 5–17)
APTT BLD: 28 SEC — SIGNIFICANT CHANGE UP (ref 27.5–35.5)
AST SERPL-CCNC: 12 U/L — LOW (ref 15–37)
BASOPHILS # BLD AUTO: 0.02 K/UL — SIGNIFICANT CHANGE UP (ref 0–0.2)
BASOPHILS NFR BLD AUTO: 0.4 % — SIGNIFICANT CHANGE UP (ref 0–2)
BILIRUB SERPL-MCNC: 0.3 MG/DL — SIGNIFICANT CHANGE UP (ref 0.2–1.2)
BUN SERPL-MCNC: 19 MG/DL — SIGNIFICANT CHANGE UP (ref 7–23)
CALCIUM SERPL-MCNC: 8.7 MG/DL — SIGNIFICANT CHANGE UP (ref 8.5–10.1)
CHLORIDE SERPL-SCNC: 102 MMOL/L — SIGNIFICANT CHANGE UP (ref 96–108)
CO2 SERPL-SCNC: 25 MMOL/L — SIGNIFICANT CHANGE UP (ref 22–31)
CREAT SERPL-MCNC: 0.99 MG/DL — SIGNIFICANT CHANGE UP (ref 0.5–1.3)
EGFR: 54 ML/MIN/1.73M2 — LOW
EOSINOPHIL # BLD AUTO: 0.36 K/UL — SIGNIFICANT CHANGE UP (ref 0–0.5)
EOSINOPHIL NFR BLD AUTO: 7.6 % — HIGH (ref 0–6)
FLUAV AG NPH QL: SIGNIFICANT CHANGE UP
FLUBV AG NPH QL: SIGNIFICANT CHANGE UP
GLUCOSE SERPL-MCNC: 100 MG/DL — HIGH (ref 70–99)
HCT VFR BLD CALC: 20.9 % — CRITICAL LOW (ref 34.5–45)
HGB BLD-MCNC: 5.9 G/DL — CRITICAL LOW (ref 11.5–15.5)
IMM GRANULOCYTES NFR BLD AUTO: 0.2 % — SIGNIFICANT CHANGE UP (ref 0–0.9)
INR BLD: 1.07 RATIO — SIGNIFICANT CHANGE UP (ref 0.88–1.16)
LYMPHOCYTES # BLD AUTO: 0.67 K/UL — LOW (ref 1–3.3)
LYMPHOCYTES # BLD AUTO: 14.1 % — SIGNIFICANT CHANGE UP (ref 13–44)
MCHC RBC-ENTMCNC: 18.6 PG — LOW (ref 27–34)
MCHC RBC-ENTMCNC: 28.2 GM/DL — LOW (ref 32–36)
MCV RBC AUTO: 65.9 FL — LOW (ref 80–100)
MONOCYTES # BLD AUTO: 0.59 K/UL — SIGNIFICANT CHANGE UP (ref 0–0.9)
MONOCYTES NFR BLD AUTO: 12.4 % — SIGNIFICANT CHANGE UP (ref 2–14)
NEUTROPHILS # BLD AUTO: 3.09 K/UL — SIGNIFICANT CHANGE UP (ref 1.8–7.4)
NEUTROPHILS NFR BLD AUTO: 65.3 % — SIGNIFICANT CHANGE UP (ref 43–77)
PLATELET # BLD AUTO: 180 K/UL — SIGNIFICANT CHANGE UP (ref 150–400)
POTASSIUM SERPL-MCNC: 4.4 MMOL/L — SIGNIFICANT CHANGE UP (ref 3.5–5.3)
POTASSIUM SERPL-SCNC: 4.4 MMOL/L — SIGNIFICANT CHANGE UP (ref 3.5–5.3)
PROT SERPL-MCNC: 6.7 GM/DL — SIGNIFICANT CHANGE UP (ref 6–8.3)
PROTHROM AB SERPL-ACNC: 12.4 SEC — SIGNIFICANT CHANGE UP (ref 10.5–13.4)
RBC # BLD: 3.17 M/UL — LOW (ref 3.8–5.2)
RBC # FLD: 21 % — HIGH (ref 10.3–14.5)
RSV RNA NPH QL NAA+NON-PROBE: SIGNIFICANT CHANGE UP
SARS-COV-2 RNA SPEC QL NAA+PROBE: SIGNIFICANT CHANGE UP
SODIUM SERPL-SCNC: 132 MMOL/L — LOW (ref 135–145)
WBC # BLD: 4.74 K/UL — SIGNIFICANT CHANGE UP (ref 3.8–10.5)
WBC # FLD AUTO: 4.74 K/UL — SIGNIFICANT CHANGE UP (ref 3.8–10.5)

## 2022-10-05 PROCEDURE — 85045 AUTOMATED RETICULOCYTE COUNT: CPT

## 2022-10-05 PROCEDURE — 36430 TRANSFUSION BLD/BLD COMPNT: CPT

## 2022-10-05 PROCEDURE — P9040: CPT

## 2022-10-05 PROCEDURE — 82728 ASSAY OF FERRITIN: CPT

## 2022-10-05 PROCEDURE — P9016: CPT

## 2022-10-05 PROCEDURE — 83540 ASSAY OF IRON: CPT

## 2022-10-05 PROCEDURE — 82746 ASSAY OF FOLIC ACID SERUM: CPT

## 2022-10-05 PROCEDURE — 97116 GAIT TRAINING THERAPY: CPT | Mod: GP

## 2022-10-05 PROCEDURE — 99291 CRITICAL CARE FIRST HOUR: CPT

## 2022-10-05 PROCEDURE — 85014 HEMATOCRIT: CPT

## 2022-10-05 PROCEDURE — 84443 ASSAY THYROID STIM HORMONE: CPT

## 2022-10-05 PROCEDURE — 85018 HEMOGLOBIN: CPT

## 2022-10-05 PROCEDURE — 82607 VITAMIN B-12: CPT

## 2022-10-05 PROCEDURE — 97163 PT EVAL HIGH COMPLEX 45 MIN: CPT | Mod: GP

## 2022-10-05 PROCEDURE — 71045 X-RAY EXAM CHEST 1 VIEW: CPT | Mod: 26

## 2022-10-05 PROCEDURE — 84100 ASSAY OF PHOSPHORUS: CPT

## 2022-10-05 PROCEDURE — 80053 COMPREHEN METABOLIC PANEL: CPT

## 2022-10-05 PROCEDURE — 85025 COMPLETE CBC W/AUTO DIFF WBC: CPT

## 2022-10-05 PROCEDURE — 93010 ELECTROCARDIOGRAM REPORT: CPT

## 2022-10-05 PROCEDURE — 86923 COMPATIBILITY TEST ELECTRIC: CPT

## 2022-10-05 PROCEDURE — C9113: CPT

## 2022-10-05 PROCEDURE — 83735 ASSAY OF MAGNESIUM: CPT

## 2022-10-05 PROCEDURE — 83550 IRON BINDING TEST: CPT

## 2022-10-05 PROCEDURE — 83010 ASSAY OF HAPTOGLOBIN QUANT: CPT

## 2022-10-05 PROCEDURE — 82248 BILIRUBIN DIRECT: CPT

## 2022-10-05 PROCEDURE — 82306 VITAMIN D 25 HYDROXY: CPT

## 2022-10-05 PROCEDURE — 36415 COLL VENOUS BLD VENIPUNCTURE: CPT

## 2022-10-05 PROCEDURE — 83615 LACTATE (LD) (LDH) ENZYME: CPT

## 2022-10-05 NOTE — ED ADULT NURSE NOTE - OBJECTIVE STATEMENT
89 y/o female awake alert and oriented x4 presents to ED c/o abnormal lab results. pt had outpatient lab work yesterday and told to come in for low H/H. Denies hx anemia. hx RA. Denies chest pain, sob, nausea, vomiting, abd pain, fever/chills.

## 2022-10-05 NOTE — ED PROVIDER NOTE - CRITICAL CARE ATTENDING CONTRIBUTION TO CARE
Critical care time spent evaluating and reevaluating patient, ordering and interpreting diagnostics, ordering therapeutics including blood, speaking to patient and admitting MD and documenting. Brittney VALDIVIA

## 2022-10-05 NOTE — ED ADULT TRIAGE NOTE - CHIEF COMPLAINT QUOTE
Pt sent in by Dr. Flores for abnormal labs. Hemoglobin was 5.8 on outpatient labs from yesterday. Endorses fatigue. Denies chest pain, SOB, dark/bloody stools, or hematuria.

## 2022-10-05 NOTE — ED PROVIDER NOTE - OBJECTIVE STATEMENT
89 year old woman with HTN, HLD, aortic stenosis, mitral regurgitation, hypothyroidism, GERD, osteoarthritis and rheumatoid arthritis, on pacemaker presents to the ED sent in by Dr. Flores for abnormal labs. Hemoglobin was 5.8 on outpatient labs from yesterday. Pt endorses fatigue. Denies SOB, chest pain. Pt had 2 weeks of bloody stools in April but went away.

## 2022-10-05 NOTE — ED PROVIDER NOTE - CLINICAL SUMMARY MEDICAL DECISION MAKING FREE TEXT BOX
pt with a hx pacemaker and rheumatoid arthritis here with abnormal labs with a low hemoglobin. In April had bloody stools but none now. Repeat labs and pt is hemodynamically stable

## 2022-10-06 ENCOUNTER — TRANSCRIPTION ENCOUNTER (OUTPATIENT)
Age: 87
End: 2022-10-06

## 2022-10-06 LAB
ADD ON TEST-SPECIMEN IN LAB: SIGNIFICANT CHANGE UP
ADD ON TEST-SPECIMEN IN LAB: SIGNIFICANT CHANGE UP
ALBUMIN SERPL ELPH-MCNC: 2.9 G/DL — LOW (ref 3.3–5)
ALP SERPL-CCNC: 104 U/L — SIGNIFICANT CHANGE UP (ref 40–120)
ALT FLD-CCNC: 10 U/L — LOW (ref 12–78)
ANION GAP SERPL CALC-SCNC: 8 MMOL/L — SIGNIFICANT CHANGE UP (ref 5–17)
AST SERPL-CCNC: 15 U/L — SIGNIFICANT CHANGE UP (ref 15–37)
BASOPHILS # BLD AUTO: 0.02 K/UL — SIGNIFICANT CHANGE UP (ref 0–0.2)
BASOPHILS NFR BLD AUTO: 0.3 % — SIGNIFICANT CHANGE UP (ref 0–2)
BILIRUB SERPL-MCNC: 1.4 MG/DL — HIGH (ref 0.2–1.2)
BUN SERPL-MCNC: 15 MG/DL — SIGNIFICANT CHANGE UP (ref 7–23)
CALCIUM SERPL-MCNC: 9.1 MG/DL — SIGNIFICANT CHANGE UP (ref 8.5–10.1)
CHLORIDE SERPL-SCNC: 103 MMOL/L — SIGNIFICANT CHANGE UP (ref 96–108)
CO2 SERPL-SCNC: 22 MMOL/L — SIGNIFICANT CHANGE UP (ref 22–31)
CREAT SERPL-MCNC: 0.82 MG/DL — SIGNIFICANT CHANGE UP (ref 0.5–1.3)
EGFR: 68 ML/MIN/1.73M2 — SIGNIFICANT CHANGE UP
EOSINOPHIL # BLD AUTO: 0.23 K/UL — SIGNIFICANT CHANGE UP (ref 0–0.5)
EOSINOPHIL NFR BLD AUTO: 3.8 % — SIGNIFICANT CHANGE UP (ref 0–6)
FERRITIN SERPL-MCNC: 19 NG/ML — SIGNIFICANT CHANGE UP (ref 15–150)
FOLATE SERPL-MCNC: 16.4 NG/ML — SIGNIFICANT CHANGE UP
GLUCOSE SERPL-MCNC: 98 MG/DL — SIGNIFICANT CHANGE UP (ref 70–99)
HAPTOGLOB SERPL-MCNC: 236 MG/DL — HIGH (ref 34–200)
HCT VFR BLD CALC: 28.2 % — LOW (ref 34.5–45)
HCT VFR BLD CALC: 28.2 % — LOW (ref 34.5–45)
HGB BLD-MCNC: 8.4 G/DL — LOW (ref 11.5–15.5)
HGB BLD-MCNC: 8.5 G/DL — LOW (ref 11.5–15.5)
IMM GRANULOCYTES NFR BLD AUTO: 0.5 % — SIGNIFICANT CHANGE UP (ref 0–0.9)
IRON SATN MFR SERPL: 155 UG/DL — SIGNIFICANT CHANGE UP (ref 30–160)
IRON SATN MFR SERPL: 37 % — SIGNIFICANT CHANGE UP (ref 14–50)
LYMPHOCYTES # BLD AUTO: 0.6 K/UL — LOW (ref 1–3.3)
LYMPHOCYTES # BLD AUTO: 10 % — LOW (ref 13–44)
MCHC RBC-ENTMCNC: 20.8 PG — LOW (ref 27–34)
MCHC RBC-ENTMCNC: 30.1 GM/DL — LOW (ref 32–36)
MCV RBC AUTO: 69.1 FL — LOW (ref 80–100)
MONOCYTES # BLD AUTO: 0.43 K/UL — SIGNIFICANT CHANGE UP (ref 0–0.9)
MONOCYTES NFR BLD AUTO: 7.2 % — SIGNIFICANT CHANGE UP (ref 2–14)
NEUTROPHILS # BLD AUTO: 4.67 K/UL — SIGNIFICANT CHANGE UP (ref 1.8–7.4)
NEUTROPHILS NFR BLD AUTO: 78.2 % — HIGH (ref 43–77)
PLATELET # BLD AUTO: 220 K/UL — SIGNIFICANT CHANGE UP (ref 150–400)
POTASSIUM SERPL-MCNC: 4.2 MMOL/L — SIGNIFICANT CHANGE UP (ref 3.5–5.3)
POTASSIUM SERPL-SCNC: 4.2 MMOL/L — SIGNIFICANT CHANGE UP (ref 3.5–5.3)
PROT SERPL-MCNC: 7 GM/DL — SIGNIFICANT CHANGE UP (ref 6–8.3)
RBC # BLD: 4.08 M/UL — SIGNIFICANT CHANGE UP (ref 3.8–5.2)
RBC # BLD: 4.11 M/UL — SIGNIFICANT CHANGE UP (ref 3.8–5.2)
RBC # FLD: 20.5 % — HIGH (ref 10.3–14.5)
RETICS #: 60.4 K/UL — SIGNIFICANT CHANGE UP (ref 25–125)
RETICS/RBC NFR: 1.5 % — SIGNIFICANT CHANGE UP (ref 0.5–2.5)
SODIUM SERPL-SCNC: 133 MMOL/L — LOW (ref 135–145)
TIBC SERPL-MCNC: 423 UG/DL — SIGNIFICANT CHANGE UP (ref 220–430)
UIBC SERPL-MCNC: 268 UG/DL — SIGNIFICANT CHANGE UP (ref 110–370)
VIT B12 SERPL-MCNC: 1044 PG/ML — SIGNIFICANT CHANGE UP (ref 232–1245)
WBC # BLD: 5.98 K/UL — SIGNIFICANT CHANGE UP (ref 3.8–10.5)
WBC # FLD AUTO: 5.98 K/UL — SIGNIFICANT CHANGE UP (ref 3.8–10.5)

## 2022-10-06 PROCEDURE — 99497 ADVNCD CARE PLAN 30 MIN: CPT | Mod: 25

## 2022-10-06 PROCEDURE — 99223 1ST HOSP IP/OBS HIGH 75: CPT | Mod: AI

## 2022-10-06 RX ORDER — PREGABALIN 225 MG/1
1000 CAPSULE ORAL DAILY
Refills: 0 | Status: DISCONTINUED | OUTPATIENT
Start: 2022-10-06 | End: 2022-10-07

## 2022-10-06 RX ORDER — HYDRALAZINE HCL 50 MG
10 TABLET ORAL EVERY 6 HOURS
Refills: 0 | Status: DISCONTINUED | OUTPATIENT
Start: 2022-10-06 | End: 2022-10-07

## 2022-10-06 RX ORDER — PANTOPRAZOLE SODIUM 20 MG/1
40 TABLET, DELAYED RELEASE ORAL EVERY 12 HOURS
Refills: 0 | Status: DISCONTINUED | OUTPATIENT
Start: 2022-10-06 | End: 2022-10-07

## 2022-10-06 RX ORDER — HYDRALAZINE HCL 50 MG
10 TABLET ORAL ONCE
Refills: 0 | Status: COMPLETED | OUTPATIENT
Start: 2022-10-06 | End: 2022-10-06

## 2022-10-06 RX ORDER — ACETAMINOPHEN 500 MG
650 TABLET ORAL EVERY 6 HOURS
Refills: 0 | Status: DISCONTINUED | OUTPATIENT
Start: 2022-10-06 | End: 2022-10-07

## 2022-10-06 RX ORDER — PREGABALIN 225 MG/1
1 CAPSULE ORAL
Qty: 0 | Refills: 0 | DISCHARGE

## 2022-10-06 RX ORDER — METOPROLOL TARTRATE 50 MG
25 TABLET ORAL DAILY
Refills: 0 | Status: DISCONTINUED | OUTPATIENT
Start: 2022-10-06 | End: 2022-10-07

## 2022-10-06 RX ORDER — CHOLECALCIFEROL (VITAMIN D3) 125 MCG
5 CAPSULE ORAL
Qty: 0 | Refills: 0 | DISCHARGE

## 2022-10-06 RX ORDER — AMLODIPINE BESYLATE 2.5 MG/1
1 TABLET ORAL
Qty: 0 | Refills: 0 | DISCHARGE

## 2022-10-06 RX ORDER — ONDANSETRON 8 MG/1
4 TABLET, FILM COATED ORAL EVERY 8 HOURS
Refills: 0 | Status: DISCONTINUED | OUTPATIENT
Start: 2022-10-06 | End: 2022-10-07

## 2022-10-06 RX ORDER — LEVOTHYROXINE SODIUM 125 MCG
100 TABLET ORAL DAILY
Refills: 0 | Status: DISCONTINUED | OUTPATIENT
Start: 2022-10-06 | End: 2022-10-07

## 2022-10-06 RX ORDER — FOLIC ACID 0.8 MG
1 TABLET ORAL
Qty: 0 | Refills: 0 | DISCHARGE

## 2022-10-06 RX ORDER — SERTRALINE 25 MG/1
25 TABLET, FILM COATED ORAL DAILY
Refills: 0 | Status: DISCONTINUED | OUTPATIENT
Start: 2022-10-06 | End: 2022-10-07

## 2022-10-06 RX ORDER — LANOLIN ALCOHOL/MO/W.PET/CERES
3 CREAM (GRAM) TOPICAL AT BEDTIME
Refills: 0 | Status: DISCONTINUED | OUTPATIENT
Start: 2022-10-06 | End: 2022-10-07

## 2022-10-06 RX ADMIN — PANTOPRAZOLE SODIUM 40 MILLIGRAM(S): 20 TABLET, DELAYED RELEASE ORAL at 14:22

## 2022-10-06 RX ADMIN — Medication 3 MILLIGRAM(S): at 23:07

## 2022-10-06 RX ADMIN — PREGABALIN 1000 MICROGRAM(S): 225 CAPSULE ORAL at 14:23

## 2022-10-06 RX ADMIN — Medication 25 MILLIGRAM(S): at 14:23

## 2022-10-06 RX ADMIN — Medication 100 MICROGRAM(S): at 14:23

## 2022-10-06 RX ADMIN — Medication 10 MILLIGRAM(S): at 06:43

## 2022-10-06 RX ADMIN — SERTRALINE 25 MILLIGRAM(S): 25 TABLET, FILM COATED ORAL at 14:23

## 2022-10-06 RX ADMIN — PANTOPRAZOLE SODIUM 40 MILLIGRAM(S): 20 TABLET, DELAYED RELEASE ORAL at 23:07

## 2022-10-06 NOTE — PROVIDER CONTACT NOTE (OTHER) - REASON
Patient is about to receive second unit of PRBCs and /69
Patient BP after Post Blood at 0540 is 190/80 Manually

## 2022-10-06 NOTE — H&P ADULT - HISTORY OF PRESENT ILLNESS
89F w/ PMHx of HTN, HLD, AS, MR, PPM, Hypothyroidism, GERD, OA/RA (On Prednisone), presented to ED from home for abnormal hemoglobin (5.8) drawn at outpatient lab. Pt was on home hospice after a hospitalization in April 2022 for a syncopal episode, she has been doing well and 3 weeks ago was taken off of hospice ([facility] in [facility]). She had labs taken by her rheumatologist and was found to be profoundly anemic. Patient is a poor historian and information collected from her daughter. Pt c/o fatigue, no bloody bowel movements, hematuria, or hematochezia.     IN ED: No Leukocytosis, Hgb 5.9, Na+ 132, SCr 0.99, Guaiac Negative, EKG V-paced, CXR; No acute findings. GIVEN: 2 units PRBC & Hydralazine 10mg x 1 dose for HTN post transfusion.  89F w/ PMHx of HTN, HLD, AS s/p TAVR, MR, PPM, Hypothyroidism, GERD, OA/RA (On Prednisone), presented to ED from home for abnormal hemoglobin (5.8) drawn at outpatient lab. Pt was on home hospice after a hospitalization in April 2022 for a syncopal episode, she has been doing well and 3 weeks ago was taken off of hospice ([facility]). She had labs taken by her rheumatologist and was found to be profoundly anemic. Patient is a poor historian and information collected from her daughter. Pt c/o fatigue, no bloody bowel movements, hematuria, or hematochezia.     IN ED: No Leukocytosis, Hgb 5.9, Na+ 132, SCr 0.99, Guaiac Negative, EKG V-paced, CXR; No acute findings. GIVEN: 2 units PRBC & Hydralazine 10mg x 1 dose for HTN post transfusion.

## 2022-10-06 NOTE — H&P ADULT - NSHPREVIEWOFSYSTEMS_GEN_ALL_CORE
REVIEW OF SYSTEMS:    CONSTITUTIONAL: No weakness, fevers or chills  EYES/ENT: No visual changes; No vertigo or throat pain   NECK: No pain or stiffness  RESPIRATORY: No cough, wheezing, hemoptysis; No shortness of breath  CARDIOVASCULAR: No chest pain or palpitations  GASTROINTESTINAL: No abdominal or epigastric pain. No nausea, vomiting, or hematemesis; No diarrhea or constipation. No melena or hematochezia.  GENITOURINARY: No dysuria, frequency or hematuria  NEUROLOGICAL: No numbness + weakness  SKIN: No itching, burning, rashes, or lesions   All other review of systems is negative unless indicated above.

## 2022-10-06 NOTE — H&P ADULT - NS ATTEND AMEND GEN_ALL_CORE FT
89F w/ PMHx of HTN, HLD, AS s/p TAVR, MR, PPM, Hypothyroidism, GERD, OA/RA (On Prednisone), presented to ED from home for abnormal hemoglobin (5.8) drawn at outpatient lab. Pt was on home hospice after a hospitalization in April 2022 for a syncopal episode, she has been doing well and 3 weeks ago was taken off of hospice     Vital Signs reviewed in  Last 24 Hrs  T(C): 37 (06 Oct 2022 15:43), Max: 37.5 (06 Oct 2022 08:56)  T(F): 98.6 (06 Oct 2022 15:43), Max: 99.5 (06 Oct 2022 08:56)  HR: 61 (06 Oct 2022 15:43) (61 - 72)  BP: 131/42 (06 Oct 2022 15:43) (131/42 - 190/82)  BP(mean): --  ABP: --  ABP(mean): --  RR: 18 (06 Oct 2022 15:43) (17 - 18)  SpO2: 96% (06 Oct 2022 15:43) (94% - 99%)  O2 Parameters below as of 06 Oct 2022 15:43  Patient On (Oxygen Delivery Method): room air    PHYSICAL EXAM:    Constitutional: NAD, awake and alert , pale   HEENT: PERR, EOMI, Normal Hearing, MMM  Neck: Soft and supple, No LAD, No JVD  Respiratory: Breath sounds are clear bilaterally, No wheezing, rales or rhonchi  Cardiovascular: S1 and S2, regular rate and rhythm, +  Murmurs, gallops or rubs  Gastrointestinal: Bowel Sounds present, soft, nontender, nondistended, no guarding, no rebound  Extremities: No peripheral edema  Vascular: 2+ peripheral pulses  Neurological: A/O x 2, no focal deficits  Musculoskeletal: 5/5 strength b/l upper and lower extremities  Skin: No rashes  rectal : deferred, not indicated    A/P :     Acute on chronic microcytic anemia likely iron deficiency without overt GI bleeding - s/p 2 units PRBC , c/w IV PPI ( pt on chronic steroids) possible gastritis, no GI evaluation as goals of care - conservative management, no procedures as per pt and family     Valvular heart disease s/p TAVR , h/o heart block s/p PPM , HTN - c/w BB, hydralazine , monitor bp    RA - c/w steroids with PPI     Hyponatremia - likely due to dehydration, repeat in am    Hypothyroidism - c/w synthroid, check tsh    depression - c/w ssri     code status - DNR/DNI

## 2022-10-06 NOTE — CHART NOTE - NSCHARTNOTEFT_GEN_A_CORE
notified by nurse for /78. Ordered hydralazine IV 10mg. notified by nurse for /78 s/p 2units of pRBC. Ordered hydralazine IV 10mg.

## 2022-10-06 NOTE — H&P ADULT - NSHPSOCIALHISTORY_GEN_ALL_CORE
Lives with daughter in a house, needs assistance in ADLs, uses a rolling walker. No EtOH or illicit drugs. Hx of smoking > 40 years ago.

## 2022-10-06 NOTE — PROVIDER CONTACT NOTE (OTHER) - ASSESSMENT
and cardiac history. Takes amlodipine and metoprolol at home but her medications haven't been reconciled yet inpatient  Patient Vitals: /80 Manually, Temp 98.4 (O), 98% RA, RR 17.   Denies any symptoms and headache

## 2022-10-06 NOTE — DISCHARGE NOTE NURSING/CASE MANAGEMENT/SOCIAL WORK - NSDCDMENAME_GEN_ALL_CORE_FT
Transport wheelchair to be delivered to patient's home pending insurance authorization.  Ordered thru [facility] [employer] [facility] company

## 2022-10-06 NOTE — CHART NOTE - NSCHARTNOTEFT_GEN_A_CORE
Patient will require a TRANSPORT wheelchair due to weakness, unsteady gait, valvular heart disease, and symptomatic anemia. The beneficiary has a mobility limitation that significantly impairs their ability to participate in one or more MRADLs within the home.  Patient cannot safely ambulate with walker, cane or crutches. Use of the TRANSPORT wheelchair will significantly improve the patient’s ability to participate in MRADLs and the patient will use the wheelchair on a regular basis at home.  The patient is able to self-propel and is agreeable to using the wheelchair.

## 2022-10-06 NOTE — PROVIDER CONTACT NOTE (OTHER) - BACKGROUND
89 y/o female admitted due to abnormal labs came in for anemia. Hemoglobin outpt was 5.8. Patient currently on bridge orders and hasn't been seen by an admitting doctor. Has a history of hypertension
89 y/o female admitted due to abnormal labs came in for anemia. Hemoglobin outpt was 5.8. Patient currently on bridge orders and hasn't been seen by an admitting doctor. Has a history of hypertension

## 2022-10-06 NOTE — PHYSICAL THERAPY INITIAL EVALUATION ADULT - MODALITIES TREATMENT COMMENTS
pt left seated in chair post Eval; chair alarm donned; callbell in reach; pt instructed not to get up alone; call nursing for assist; radha well; denied pain

## 2022-10-06 NOTE — H&P ADULT - CONVERSATION DETAILS
CPR and resuscitative measures discussed, patient and her daughter want a DNR/I and to allow natural death.

## 2022-10-06 NOTE — H&P ADULT - NSHPPHYSICALEXAM_GEN_ALL_CORE
PHYSICAL EXAM:  GENERAL: NAD, able to lie flat in bed  HEAD:  Atraumatic, Normocephalic  EYES: EOMI, PERRLA, normal sclera  ENT: Moist mucous membranes; [place]; Upper & Lower Dentures   NECK: Supple, No JVD, no nuchal rigidity  CHEST/LUNG: Clear to auscultation bilaterally; No rales, rhonchi, wheezing, or rubs. Unlabored respirations  HEART: Regular rate and rhythm; No murmurs, rubs, or gallops  ABDOMEN: Bowel sounds present; Soft, Nontender, Nondistended. No hepatomegaly  EXTREMITIES:  no pitting bilaterally  NERVOUS SYSTEM:  Alert & Oriented X2, speech clear. No focal motor or sensory deficits  MSK: FROM all 4 extremities, full and equal strength  SKIN: No rashes or lesions PHYSICAL EXAM:  GENERAL: NAD, able to lie flat in bed  HEAD:  Atraumatic, Normocephalic  EYES: EOMI, PERRLA, normal sclera  ENT: Moist mucous membranes; [place]; Upper & Lower Dentures   NECK: Supple, No JVD, no nuchal rigidity  CHEST/LUNG: Clear to auscultation bilaterally; No rales, rhonchi, wheezing, or rubs. Unlabored respirations  HEART: Regular rate and rhythm; + murmur, No rubs, or gallops  ABDOMEN: Bowel sounds present; Soft, Nontender, Nondistended. No hepatomegaly  EXTREMITIES:  no pitting bilaterally  NERVOUS SYSTEM:  Alert & Oriented X2, speech clear. No focal motor or sensory deficits  MSK: FROM all 4 extremities, full and equal strength  SKIN: No rashes or lesions

## 2022-10-06 NOTE — H&P ADULT - ASSESSMENT
89F w/ PMHx of HTN, HLD, AS, MR, PPM, Hypothyroidism, GERD, OA/RA (On Prednisone), presented to ED from home for abnormal hemoglobin (5.8) drawn at outpatient lab. Pt was on home hospice after a hospitalization in April 2022 for a syncopal episode, she has been doing well and 3 weeks ago was taken off of hospice ([facility]NS in [facility]). She had labs taken by her rheumatologist and was found to be profoundly anemic.     Severe Anemia, cannot rule out malignancy   - No history of NSAID or ASA use   - Hemoglobin 5. 9 on admission; transfused 2U PRBC with repeat Hgb 8.5  - Required Hydralazine x 1 dose for hypertension following transfusion   - Patient and Family want conservative management only, refused endoscopic evaluation  - Guaiac negative   - No Iron, B12, or Folate Deficiency noted, No Hemolysis; LDH wnl & Haptoglobin slightly elevated   - PPI IV BID   - No GI consultation as the patient and family are refusing     Hyponatremia, likely due to dehydration   - Trend BMP  - Encourage PO fluids    Mild Hyperbilirubinemia   - Trend LFTs     Hypothyroidism   - Check TSH  - Continue Synthroid     Valvular Heart Disease  Hx of Valve Replacement   PPM  HTN / HLD  - Continue Metoprolol  - Continue Hydralazine for SBP >150    RA / OA  - Continue Prednisone Once every 4 days   - Tylenol for Pian     Depression  - Continue Zoloft     VTE Prophylaxis: ICDs     Disposition: Trend CBC, Transfuse < 8, f/u FOBT, PT, likely DC home tomorrow.                  89F w/ PMHx of HTN, HLD, AS s/p TAVR, MR, PPM, Hypothyroidism, GERD, OA/RA (On Prednisone), presented to ED from home for abnormal hemoglobin (5.8) drawn at outpatient lab. Pt was on home hospice after a hospitalization in April 2022 for a syncopal episode, she has been doing well and 3 weeks ago was taken off of hospice ([facility]NS in [facility]). She had labs taken by her rheumatologist and was found to be profoundly anemic.     Severe Anemia, cannot rule out malignancy   - No history of NSAID or ASA use   - Hemoglobin 5. 9 on admission; transfused 2U PRBC with repeat Hgb 8.5  - Required Hydralazine x 1 dose for hypertension following transfusion   - Patient and Family want conservative management only, refused endoscopic evaluation  - Guaiac negative   - No Iron, B12, or Folate Deficiency noted, No Hemolysis; LDH wnl & Haptoglobin slightly elevated   - PPI IV BID   - No GI consultation as the patient and family are refusing     Hyponatremia, likely due to dehydration   - Trend BMP  - Encourage PO fluids    Mild Hyperbilirubinemia   - Trend LFTs     Hypothyroidism   - Check TSH  - Continue Synthroid     Valvular Heart Disease  Hx of TAVR   PPM  HTN / HLD  - Continue Metoprolol  - Continue Hydralazine for SBP >150    RA / OA  - Continue Prednisone Once every 4 days   - Tylenol for Pian     Depression  - Continue Zoloft     VTE Prophylaxis: ICDs     Disposition: Trend CBC, Transfuse < 8, f/u FOBT, PT, likely DC home tomorrow.                  89F w/ PMHx of HTN, HLD, AS s/p TAVR, MR, PPM, Hypothyroidism, GERD, OA/RA (On Prednisone), presented to ED from home for abnormal hemoglobin (5.8) drawn at outpatient lab. Pt was on home hospice after a hospitalization in April 2022 for a syncopal episode, she has been doing well and 3 weeks ago was taken off of hospice ([facility]NS in [facility]). She had labs taken by her rheumatologist and was found to be profoundly anemic.     Severe Microcytic  Anemia, likely iron deficiency  cannot rule out malignancy   - No history of NSAID or ASA use   - Hemoglobin 5. 9 on admission; transfused 2U PRBC with repeat Hgb 8.5  - Required Hydralazine x 1 dose for hypertension following transfusion   - Patient and Family want conservative management only, refused endoscopic evaluation  - Guaiac negative   - given low borderline ferritin level of 19 <-- 462 ( 4/2022) , likely iron deficiency   - No  B12, or Folate Deficiency noted, No Hemolysis; LDH wnl & Haptoglobin slightly elevated   - PPI IV BID   - No GI consultation as the patient and family are refusing     Valvular Heart Disease s/p TAVR   h/o heart block s/p PPM  HTN / HLD  - EKG - paced rhythm, CXR - no signs of decompensation   - Continue Metoprolol  - Continue Hydralazine for SBP >150    RA / OA  - Continue Prednisone Once every 4 days   - Tylenol for Pian     Hyponatremia, likely due to dehydration   - Trend BMP  - Encourage PO fluids    Mild Hyperbilirubinemia   - Trend LFTs     Hypothyroidism   - Check TSH  - Continue Synthroid     Depression  - Continue Zoloft     VTE Prophylaxis: ICDs     Disposition: Trend CBC, Transfuse < 8, f/u FOBT, PT, likely DC home tomorrow.

## 2022-10-06 NOTE — DISCHARGE NOTE NURSING/CASE MANAGEMENT/SOCIAL WORK - NSDCPEFALRISK_GEN_ALL_CORE
For information on Fall & Injury Prevention, visit: [email or web address] OR  [email or web address] OR  https://www.cdc.gov/steadi/patient.html

## 2022-10-06 NOTE — DISCHARGE NOTE NURSING/CASE MANAGEMENT/SOCIAL WORK - PATIENT PORTAL LINK FT
You can access the FollowMyHealth Patient Portal offered by [facility] by registering at the following website: [email or web address]. By joining [facility]’s FollowMyHealth portal, you will also be able to view your health information using other applications (apps) compatible with our system.

## 2022-10-07 ENCOUNTER — TRANSCRIPTION ENCOUNTER (OUTPATIENT)
Age: 87
End: 2022-10-07

## 2022-10-07 VITALS
HEART RATE: 53 BPM | RESPIRATION RATE: 16 BRPM | OXYGEN SATURATION: 98 % | TEMPERATURE: 98 F | SYSTOLIC BLOOD PRESSURE: 140 MMHG | DIASTOLIC BLOOD PRESSURE: 69 MMHG

## 2022-10-07 LAB
ALBUMIN SERPL ELPH-MCNC: 2.5 G/DL — LOW (ref 3.3–5)
ALP SERPL-CCNC: 79 U/L — SIGNIFICANT CHANGE UP (ref 40–120)
ALT FLD-CCNC: 8 U/L — LOW (ref 12–78)
ANION GAP SERPL CALC-SCNC: 7 MMOL/L — SIGNIFICANT CHANGE UP (ref 5–17)
AST SERPL-CCNC: 12 U/L — LOW (ref 15–37)
BASOPHILS # BLD AUTO: 0.03 K/UL — SIGNIFICANT CHANGE UP (ref 0–0.2)
BASOPHILS NFR BLD AUTO: 0.5 % — SIGNIFICANT CHANGE UP (ref 0–2)
BILIRUB SERPL-MCNC: 0.8 MG/DL — SIGNIFICANT CHANGE UP (ref 0.2–1.2)
BUN SERPL-MCNC: 14 MG/DL — SIGNIFICANT CHANGE UP (ref 7–23)
CALCIUM SERPL-MCNC: 8.5 MG/DL — SIGNIFICANT CHANGE UP (ref 8.5–10.1)
CHLORIDE SERPL-SCNC: 102 MMOL/L — SIGNIFICANT CHANGE UP (ref 96–108)
CO2 SERPL-SCNC: 24 MMOL/L — SIGNIFICANT CHANGE UP (ref 22–31)
CREAT SERPL-MCNC: 0.92 MG/DL — SIGNIFICANT CHANGE UP (ref 0.5–1.3)
EGFR: 59 ML/MIN/1.73M2 — LOW
EOSINOPHIL # BLD AUTO: 0.28 K/UL — SIGNIFICANT CHANGE UP (ref 0–0.5)
EOSINOPHIL NFR BLD AUTO: 5 % — SIGNIFICANT CHANGE UP (ref 0–6)
GLUCOSE SERPL-MCNC: 83 MG/DL — SIGNIFICANT CHANGE UP (ref 70–99)
HCT VFR BLD CALC: 23.9 % — LOW (ref 34.5–45)
HGB BLD-MCNC: 7.1 G/DL — LOW (ref 11.5–15.5)
IMM GRANULOCYTES NFR BLD AUTO: 0.5 % — SIGNIFICANT CHANGE UP (ref 0–0.9)
LYMPHOCYTES # BLD AUTO: 0.68 K/UL — LOW (ref 1–3.3)
LYMPHOCYTES # BLD AUTO: 12.2 % — LOW (ref 13–44)
MAGNESIUM SERPL-MCNC: 2 MG/DL — SIGNIFICANT CHANGE UP (ref 1.6–2.6)
MCHC RBC-ENTMCNC: 20.5 PG — LOW (ref 27–34)
MCHC RBC-ENTMCNC: 29.7 GM/DL — LOW (ref 32–36)
MCV RBC AUTO: 69.1 FL — LOW (ref 80–100)
MONOCYTES # BLD AUTO: 0.58 K/UL — SIGNIFICANT CHANGE UP (ref 0–0.9)
MONOCYTES NFR BLD AUTO: 10.4 % — SIGNIFICANT CHANGE UP (ref 2–14)
NEUTROPHILS # BLD AUTO: 3.96 K/UL — SIGNIFICANT CHANGE UP (ref 1.8–7.4)
NEUTROPHILS NFR BLD AUTO: 71.4 % — SIGNIFICANT CHANGE UP (ref 43–77)
PHOSPHATE SERPL-MCNC: 3.2 MG/DL — SIGNIFICANT CHANGE UP (ref 2.5–4.5)
PLATELET # BLD AUTO: 192 K/UL — SIGNIFICANT CHANGE UP (ref 150–400)
POTASSIUM SERPL-MCNC: 4.3 MMOL/L — SIGNIFICANT CHANGE UP (ref 3.5–5.3)
POTASSIUM SERPL-SCNC: 4.3 MMOL/L — SIGNIFICANT CHANGE UP (ref 3.5–5.3)
PROT SERPL-MCNC: 6.1 GM/DL — SIGNIFICANT CHANGE UP (ref 6–8.3)
RBC # BLD: 3.46 M/UL — LOW (ref 3.8–5.2)
RBC # FLD: 20.8 % — HIGH (ref 10.3–14.5)
SODIUM SERPL-SCNC: 133 MMOL/L — LOW (ref 135–145)
WBC # BLD: 5.56 K/UL — SIGNIFICANT CHANGE UP (ref 3.8–10.5)
WBC # FLD AUTO: 5.56 K/UL — SIGNIFICANT CHANGE UP (ref 3.8–10.5)

## 2022-10-07 PROCEDURE — 99239 HOSP IP/OBS DSCHRG MGMT >30: CPT

## 2022-10-07 RX ORDER — FERROUS SULFATE 325(65) MG
325 TABLET ORAL DAILY
Refills: 0 | Status: DISCONTINUED | OUTPATIENT
Start: 2022-10-07 | End: 2022-10-07

## 2022-10-07 RX ORDER — ACETAMINOPHEN 500 MG
2 TABLET ORAL
Qty: 0 | Refills: 0 | DISCHARGE
Start: 2022-10-07

## 2022-10-07 RX ORDER — POLYETHYLENE GLYCOL 3350 17 G/17G
17 POWDER, FOR SOLUTION ORAL ONCE
Refills: 0 | Status: COMPLETED | OUTPATIENT
Start: 2022-10-07 | End: 2022-10-07

## 2022-10-07 RX ORDER — POLYETHYLENE GLYCOL 3350 17 G/17G
17 POWDER, FOR SOLUTION ORAL
Qty: 1 | Refills: 0
Start: 2022-10-07 | End: 2022-11-05

## 2022-10-07 RX ORDER — FERROUS SULFATE 325(65) MG
1 TABLET ORAL
Qty: 30 | Refills: 0
Start: 2022-10-07 | End: 2022-11-05

## 2022-10-07 RX ADMIN — Medication 100 MICROGRAM(S): at 05:17

## 2022-10-07 NOTE — DISCHARGE NOTE PROVIDER - HOSPITAL COURSE
CHIEF COMPLAINT: Weakness    HPI: 89F w/ PMHx of HTN, HLD, AS s/p TAVR, MR, PPM, Hypothyroidism, GERD, OA/RA (On Prednisone), presented to ED from home for abnormal hemoglobin (5.8) drawn at outpatient lab. Pt was on home hospice after a hospitalization in April 2022 for a    Interval History: 10/7/22 pt seen and examined at examined at bedside she reports feeling well, wants to go home. Transfused additional unit this am for Hgb 7.1. Denies Cp, palps, sob, cough, HA, dizziness, lightheadedness, n/v/d, constipation, dysuria, fever or chills.     REVIEW OF SYSTEMS: All other review of systems is negative unless indicated above.    PE:  Vital Signs Last 24 Hrs: all reviewed   T(C): 36.7 (07 Oct 2022 10:14), Max: 37.3 (07 Oct 2022 05:40)  T(F): 98 (07 Oct 2022 10:14), Max: 99.1 (07 Oct 2022 05:40)  HR: 52 (07 Oct 2022 10:14) (52 - 64)  BP: 106/38 (07 Oct 2022 10:14) (106/38 - 145/62)  RR: 16 (07 Oct 2022 10:14) (16 - 18)  SpO2: 97% (07 Oct 2022 10:14) (92% - 98%)  Parameters below as of 07 Oct 2022 10:14  Patient On (Oxygen Delivery Method): room air  Constitutional: NAD, awake and alert, pale  HEENT: PERR, EOMI, MMM, [place]  Neck: Soft and supple, No JVD  Respiratory: Breath sounds are clear bilaterally, No wheezing, rales or rhonchi  Cardiovascular: S1 and S2, regular rate and rhythm, + Murmur  Gastrointestinal: Bowel Sounds present, soft, nontender, nondistended  Extremities: No peripheral edema  Vascular: 2+ peripheral pulses  Neurological: A/O x 2, no focal deficits, forgetful   Musculoskeletal: 5/5 strength b/l upper and lower extremities  Skin: No rashes    HOSPITAL COURSE:  Severe Microcytic Anemia, likely Iron Deficiency, cannot rule out malignancy   - No history of NSAID or ASA use   - Hemoglobin 5. 9 on admission; transfused 2U PRBC with repeat Hgb 8.5  - 10/7/22 Hgb 7.1, transfused additional unit of PRBC  - In ED Required Hydralazine x 1 dose for hypertension following transfusion   - Patient and Family want conservative management only, refused endoscopic evaluation  - Guaiac negative   - Given low borderline ferritin level of 19 <-- 462 ( 4/2022), likely iron deficiency   - No B12 or Folate Deficiency noted, No Hemolysis; LDH wnl & Haptoglobin slightly elevated   - PPI BID   - No GI consultation as the patient and family are refusing     Valvular Heart Disease s/p TAVR   h/o heart block s/p PPM  HTN / HLD  - EKG: paced rhythm, CXR: no signs of decompensation   - Continue Metoprolol  - Continue Hydralazine for SBP >150    RA / OA  - Continue Prednisone Once every 4 days   - Tylenol for Pian     Hyponatremia, likely due to dehydration   - Trend BMP  - Encourage PO fluids    Mild Hyperbilirubinemia, resolved   - Trend LFTs     Hypothyroidism   - TSH wnl  - Continue Synthroid     Depression  - Continue Zoloft     Dispo: discharge to home in stable condition  Final diagnosis, treatment plan, and follow-up recommendations were discussed and explained to the patient. The patient was given an opportunity to ask questions concerning the diagnosis and treatment plan. The patient acknowledged understanding of the diagnosis, treatment, and follow-up recommendations. The patient was advised to seek urgent care upon discharge if worsening symptoms develop prior to scheduled follow-up. Time spent on discharge included time with the patient, and also coordinating discharge care as outlined below.       CHIEF COMPLAINT: Weakness    HPI: 89F w/ PMHx of HTN, HLD, AS s/p TAVR, MR, PPM, Hypothyroidism, GERD, OA/RA (On Prednisone), presented to ED from home for abnormal hemoglobin (5.8) drawn at outpatient lab. Pt was on home hospice after a hospitalization in April 2022 for a    Interval History: 10/7/22 pt seen and examined at examined at bedside she reports feeling well, wants to go home. Transfused additional unit this am for Hgb 7.1. Denies Cp, palps, sob, cough, HA, dizziness, lightheadedness, n/v/d, constipation, dysuria, fever or chills.     REVIEW OF SYSTEMS: All other review of systems is negative unless indicated above.    PE:  Vital Signs Last 24 Hrs: all reviewed   T(C): 36.7 (07 Oct 2022 10:14), Max: 37.3 (07 Oct 2022 05:40)  T(F): 98 (07 Oct 2022 10:14), Max: 99.1 (07 Oct 2022 05:40)  HR: 52 (07 Oct 2022 10:14) (52 - 64)  BP: 106/38 (07 Oct 2022 10:14) (106/38 - 145/62)  RR: 16 (07 Oct 2022 10:14) (16 - 18)  SpO2: 97% (07 Oct 2022 10:14) (92% - 98%)  Parameters below as of 07 Oct 2022 10:14  Patient On (Oxygen Delivery Method): room air  Constitutional: NAD, awake and alert, pale  HEENT: PERR, EOMI, MMM, [place]  Neck: Soft and supple, No JVD  Respiratory: Breath sounds are clear bilaterally, No wheezing, rales or rhonchi  Cardiovascular: S1 and S2, regular rate and rhythm, + Murmur  Gastrointestinal: Bowel Sounds present, soft, nontender, nondistended  Extremities: No peripheral edema  Vascular: 2+ peripheral pulses  Neurological: A/O x 2, no focal deficits, forgetful   Musculoskeletal: 5/5 strength b/l upper and lower extremities  Skin: No rashes    HOSPITAL COURSE:  Severe Microcytic Anemia, likely Iron Deficiency, cannot rule out malignancy   - No history of NSAID or ASA use   - Hemoglobin 5. 9 on admission; transfused 2U PRBC with repeat Hgb 8.5  - 10/7/22 Hgb 7.1, transfused additional unit of PRBC  - In ED Required Hydralazine x 1 dose for hypertension following transfusion   - Patient and Family want conservative management only, refused endoscopic evaluation  - Guaiac negative   - Given low borderline ferritin level of 19 <-- 462 ( 4/2022), likely iron deficiency   - No B12 or Folate Deficiency noted, No Hemolysis; LDH wnl & Haptoglobin slightly elevated   - PPI BID; Start Iron supplementation with Miralax prn   - No GI consultation as the patient and family are refusing     Valvular Heart Disease s/p TAVR   h/o heart block s/p PPM  HTN / HLD  - EKG: paced rhythm, CXR: no signs of decompensation   - Continue Metoprolol  - Continue Hydralazine for SBP >150    RA / OA  - Continue Prednisone Once every 4 days   - Tylenol for Pian     Hyponatremia, likely due to dehydration   - Trend BMP  - Encourage PO fluids    Mild Hyperbilirubinemia, resolved   - Trend LFTs     Hypothyroidism   - TSH wnl  - Continue Synthroid     Depression  - Continue Zoloft     Dispo: discharge to home in stable condition  Final diagnosis, treatment plan, and follow-up recommendations were discussed and explained to the patient. The patient was given an opportunity to ask questions concerning the diagnosis and treatment plan. The patient acknowledged understanding of the diagnosis, treatment, and follow-up recommendations. The patient was advised to seek urgent care upon discharge if worsening symptoms develop prior to scheduled follow-up. Time spent on discharge included time with the patient, and also coordinating discharge care as outlined below.

## 2022-10-07 NOTE — DISCHARGE NOTE PROVIDER - CARE PROVIDER_API CALL
Abram Ramos)  Family Medicine; Geriatric Medicine  [address] [place] [address]  Phone: [phone]  Fax: [phone]  Follow Up Time: 1 week

## 2022-10-07 NOTE — DISCHARGE NOTE PROVIDER - ATTENDING DISCHARGE PHYSICAL EXAMINATION:
Vital Signs reviewed in  Last 24 Hrs  T(C): 36.6 (07 Oct 2022 12:18), Max: 37.3 (07 Oct 2022 05:40)  T(F): 97.9 (07 Oct 2022 12:18), Max: 99.1 (07 Oct 2022 05:40)  HR: 53 (07 Oct 2022 12:18) (52 - 64)  BP: 140/69 (07 Oct 2022 12:18) (106/38 - 145/62)  BP(mean): --  ABP: --  ABP(mean): --  RR: 16 (07 Oct 2022 12:18) (16 - 18)  SpO2: 98% (07 Oct 2022 12:18) (92% - 98%)  O2 Parameters below as of 07 Oct 2022 12:18  Patient On (Oxygen Delivery Method): room air  Constitutional: NAD, awake and alert, pale  HEENT: PERR, EOMI, MMM, [place]  Neck: Soft and supple, No JVD  Respiratory: Breath sounds are clear bilaterally, No wheezing, rales or rhonchi  Cardiovascular: S1 and S2, regular rate and rhythm, + Murmur  Gastrointestinal: Bowel Sounds present, soft, nontender, nondistended  Extremities: No peripheral edema  Vascular: 2+ peripheral pulses  Neurological: A/O x 2, no focal deficits, forgetful   Musculoskeletal: 5/5 strength b/l upper and lower extremities  Skin: No rashes

## 2022-10-07 NOTE — CHART NOTE - NSCHARTNOTEFT_GEN_A_CORE
Patient will require a transport wheelchair due to weakness, debility, and exertional dyspnea. The beneficiary has a mobility limitation that significantly impairs her ability to participate in one or more MRADLs such as toileting, feeding, dressing, grooming, and bathing in customary locations in the home. The patient's mobility limitation cannot be sufficiently resolved by the use of an appropriately fitted cane or walker. The patient is unable to ambulate with a walker. Use of a transport chair will significantly improve the beneficiary's ability to participate in MRADLs and the beneficiary will use it on a regular basis in the home. The beneficiary has a caregiver who is available, willing and able to provide assistance with the transport chair. The patient is not able to self propel a standard or lightweight chair.

## 2022-10-07 NOTE — DISCHARGE NOTE PROVIDER - NSDCCPCAREPLAN_GEN_ALL_CORE_FT
PRINCIPAL DISCHARGE DIAGNOSIS  Diagnosis: Severe anemia  Assessment and Plan of Treatment: Severe Microcytic Anemia, likely Iron Deficiency, cannot rule out malignancy   - No history of NSAID or ASA use   - Hemoglobin 5. 9 on admission; transfused 2U PRBC with repeat Hgb 8.5  - 10/7/22 Hgb 7.1, transfused additional unit of PRBC  - In ED Required Hydralazine x 1 dose for hypertension following transfusion   - Patient and Family want conservative management only, refused endoscopic evaluation  - Guaiac negative   - Given low borderline ferritin level of 19 <-- 462 ( 4/2022), likely iron deficiency   - No B12 or Folate Deficiency noted, No Hemolysis; LDH wnl & Haptoglobin slightly elevated   - PPI BID   - No GI consultation as the patient and family are refusing   Valvular Heart Disease s/p TAVR   h/o heart block s/p PPM  HTN / HLD  - EKG: paced rhythm, CXR: no signs of decompensation   - Continue Metoprolol  - Continue Hydralazine for SBP >150  RA / OA  - Continue Prednisone Once every 4 days   - Tylenol for Pian   Hyponatremia, likely due to dehydration   - Trend BMP  - Encourage PO fluids  Mild Hyperbilirubinemia, resolved   - Trend LFTs   Hypothyroidism   - TSH wnl  - Continue Synthroid   Depression  - Continue Zoloft       PRINCIPAL DISCHARGE DIAGNOSIS  Diagnosis: Severe anemia  Assessment and Plan of Treatment: Severe Microcytic Anemia, likely Iron Deficiency, cannot rule out malignancy   AVOID NSAIDS (Ibuprofen, Aspirin & Naproxen)   You were transfused 3 units of blood  You have decided you want conservative management only, therefore endoscopic (EGD/Colonoscopy) have been deffered  START taking: Miralax and Iron supplements as directed  CONTINUE Omeprazole   If you develop fever, chills, shortness of breath, bleeding from your rectum return to the ER   Follow up with your PCP in a week to go over the details of your hospitalization, bring this document with you        SECONDARY DISCHARGE DIAGNOSES  Diagnosis: HTN (hypertension)  Assessment and Plan of Treatment: Valvular Heart Disease / History of TAVR   History heart block / PPM  HTN / HLD  - Continue Metoprolol      Diagnosis: Rheumatoid arthritis  Assessment and Plan of Treatment: - Continue Prednisone Once every 4 days   - Tylenol for Pain    Diagnosis: Anxiety and depression  Assessment and Plan of Treatment: - Continue Zoloft    Diagnosis: Hypothyroidism  Assessment and Plan of Treatment: - TSH normal  - Continue Synthroid    Diagnosis: Hyponatremia  Assessment and Plan of Treatment: Stay hydrated  Follow up with your PCP for further monitoring

## 2022-10-07 NOTE — DISCHARGE NOTE PROVIDER - NSDCMRMEDTOKEN_GEN_ALL_CORE_FT
cyanocobalamin 1000 mcg oral tablet: 1 tab(s) orally once a day  metoprolol succinate 25 mg oral tablet, extended release: 1 tab(s) orally once a day  omeprazole 20 mg oral delayed release capsule: 1 cap(s) orally 2 times a day  predniSONE 5 mg oral tablet: Taper as directed    ***patient currently taking 1 tab every 4 days***  ***due for next dose 10/7***  Refresh Digital preserved ophthalmic solution: 1 drop(s) to each affected eye 4 times a day, As Needed - for dry eyes  sertraline 25 mg oral tablet: 1 tab(s) orally once a day  Synthroid 100 mcg (0.1 mg) oral tablet: 1 tab(s) orally once a day   acetaminophen 325 mg oral tablet: 2 tab(s) orally every 6 hours, As needed, Temp greater or equal to 38C (100.4F), Mild Pain (1 - 3)  cyanocobalamin 1000 mcg oral tablet: 1 tab(s) orally once a day  ferrous sulfate 325 mg (65 mg elemental iron) oral tablet: 1 tab(s) orally once a day  metoprolol succinate 25 mg oral tablet, extended release: 1 tab(s) orally once a day  MiraLax oral powder for reconstitution: 17 gram(s) orally once a day, As Needed  mix 17 gram in 8oz fluid and give once daily   omeprazole 20 mg oral delayed release capsule: 1 cap(s) orally 2 times a day  predniSONE 5 mg oral tablet: One tab every 4 days last dose given 10/7/22    Refresh Digital preserved ophthalmic solution: 1 drop(s) to each affected eye 4 times a day, As Needed - for dry eyes  sertraline 25 mg oral tablet: 1 tab(s) orally once a day  Synthroid 100 mcg (0.1 mg) oral tablet: 1 tab(s) orally once a day

## 2022-10-07 NOTE — DISCHARGE NOTE PROVIDER - ATTENDING DISCHARGE PHYSICAL EXAM:
"              After Visit Summary   11/13/2017    Sidney Plasencia    MRN: [id number]           Patient Information     Date Of Birth          1980        Visit Information        Provider Department      11/13/2017 6:15 PM NL FLOAT TEAM D [facility]        Today's Diagnoses     Need for prophylactic vaccination and inoculation against influenza    -  1       Follow-ups after your visit        Your next 10 appointments already scheduled     Nov 13, 2017  6:15 PM CST   Nurse Only with NL LIENAT TEAM D [facility] ([facility])    [address] [place] [id number] [phone]              Who to contact     If you have questions or need follow up information about today's clinic visit or your schedule please contact [facility] directly at [phone].  Normal or non-critical lab and imaging results will be communicated to you by CosmosIDhart, letter or phone within 4 business days after the clinic has received the results. If you do not hear from us within 7 days, please contact the clinic through CosmosIDhart or phone. If you have a critical or abnormal lab result, we will notify you by phone as soon as possible.  Submit refill requests through Zazom or call your pharmacy and they will forward the refill request to us. Please allow 3 business days for your refill to be completed.          Additional Information About Your Visit        MyChart Information     Zazom lets you send messages to your doctor, view your test results, renew your prescriptions, schedule appointments and more. To sign up, go to [email or web address] . Click on \"Log in\" on the left side of the screen, which will take you to the Welcome page. Then click on \"Sign up Now\" on the right side of the page.     You will be asked to enter the access code listed below, as well as some personal information. Please follow the directions to create your username and " password.     Your access code is: [email or web address]  Expires: 2018  4:34 PM     Your access code will  in 90 days. If you need help or a new code, please call your [facility] clinic or [phone].        Care EveryWhere ID     This is your Care EveryWhere ID. This could be used by other organizations to access your [facility] medical records  [id number]         Blood Pressure from Last 3 Encounters:   10/26/17 (!) 128/98   10/23/17 140/74   17 136/78    Weight from Last 3 Encounters:   10/23/17 (!) 337 lb 6 oz (153 kg)   17 (!) 338 lb (153.3 kg)   04/10/17 (!) 343 lb 11.2 oz (155.9 kg)              We Performed the Following     FLU VAC, SPLIT VIRUS IM > 3 YO (QUADRIVALENT) [03818]     Vaccine Administration, Initial [98078]        Primary Care Provider Office Phone # Fax #    Abner Moreno [phone] [address] [place] [address]        Equal Access to Services     [facility] : Hadii fernanda ku hadasho Soviraali, waaxda luqadaha, qaybta kaalmada adeclarisa, suzette rivera . So [facility] [phone].    ATENCIÓN: Si habla español, tiene a de jesus disposición servicios gratuitos de asistencia lingüística. Llame al [phone].    We comply with applicable federal civil rights laws and [place] laws. We do not discriminate on the basis of race, color, national origin, age, disability, sex, sexual orientation, or gender identity.            Thank you!     Thank you for choosing [facility]  for your care. Our goal is always to provide you with excellent care. Hearing back from our patients is one way we can continue to improve our services. Please take a few minutes to complete the written survey that you may receive in the mail after your visit with us. Thank you!             Your Updated Medication List - Protect others around you: Learn how to safely use, store and throw away your medicines at www.disposemymeds.org.          This list  is accurate as of: 11/13/17  6:10 PM.  Always use your most recent med list.                   Brand Name Dispense Instructions for use Diagnosis    * oxyCODONE-acetaminophen 5-325 MG per tablet    PERCOCET    25 tablet    maximum 3 tablet(s) per day    Back injury, subsequent encounter       * oxyCODONE-acetaminophen 5-325 MG per tablet    PERCOCET    120 tablet    Take 1 tablet by mouth every 6 hours as needed for pain maximum 4 tablet(s) per day    Injury of back, subsequent encounter       * Notice:  This list has 2 medication(s) that are the same as other medications prescribed for you. Read the directions carefully, and ask your doctor or other care provider to review them with you.       Attending Discharge Physical Examination:

## 2022-10-12 DIAGNOSIS — Z95.0 PRESENCE OF CARDIAC PACEMAKER: ICD-10-CM

## 2022-10-12 DIAGNOSIS — D50.9 IRON DEFICIENCY ANEMIA, UNSPECIFIED: ICD-10-CM

## 2022-10-12 DIAGNOSIS — I10 ESSENTIAL (PRIMARY) HYPERTENSION: ICD-10-CM

## 2022-10-12 DIAGNOSIS — E86.0 DEHYDRATION: ICD-10-CM

## 2022-10-12 DIAGNOSIS — E80.7 DISORDER OF BILIRUBIN METABOLISM, UNSPECIFIED: ICD-10-CM

## 2022-10-12 DIAGNOSIS — F32.A DEPRESSION, UNSPECIFIED: ICD-10-CM

## 2022-10-12 DIAGNOSIS — E78.5 HYPERLIPIDEMIA, UNSPECIFIED: ICD-10-CM

## 2022-10-12 DIAGNOSIS — Z66 DO NOT RESUSCITATE: ICD-10-CM

## 2022-10-12 DIAGNOSIS — K21.9 GASTRO-ESOPHAGEAL REFLUX DISEASE WITHOUT ESOPHAGITIS: ICD-10-CM

## 2022-10-12 DIAGNOSIS — Z95.2 PRESENCE OF PROSTHETIC HEART VALVE: ICD-10-CM

## 2022-10-12 DIAGNOSIS — M06.9 RHEUMATOID ARTHRITIS, UNSPECIFIED: ICD-10-CM

## 2022-10-12 DIAGNOSIS — E87.1 HYPO-OSMOLALITY AND HYPONATREMIA: ICD-10-CM

## 2022-10-12 DIAGNOSIS — E03.9 HYPOTHYROIDISM, UNSPECIFIED: ICD-10-CM

## 2022-12-15 ENCOUNTER — APPOINTMENT (OUTPATIENT)
Dept: ELECTROPHYSIOLOGY | Facility: CLINIC | Age: 87
End: 2022-12-15

## 2022-12-15 ENCOUNTER — NON-APPOINTMENT (OUTPATIENT)
Age: 87
End: 2022-12-15

## 2022-12-15 PROCEDURE — 93294 REM INTERROG EVL PM/LDLS PM: CPT

## 2022-12-15 PROCEDURE — 93296 REM INTERROG EVL PM/IDS: CPT

## 2023-03-20 ENCOUNTER — APPOINTMENT (OUTPATIENT)
Dept: ELECTROPHYSIOLOGY | Facility: CLINIC | Age: 88
End: 2023-03-20
Payer: MEDICARE

## 2023-03-21 ENCOUNTER — NON-APPOINTMENT (OUTPATIENT)
Age: 88
End: 2023-03-21

## 2023-03-21 PROCEDURE — 93294 REM INTERROG EVL PM/LDLS PM: CPT

## 2023-03-21 PROCEDURE — 93296 REM INTERROG EVL PM/IDS: CPT

## 2023-03-30 NOTE — PHYSICAL THERAPY INITIAL EVALUATION ADULT - SITTING BALANCE: STATIC
Influenza NEGATIVE.  Rapid Covid NEGATIVE.  PCR obtained and pending in case coinfection.  Patient will get results from [facility].    Reviewed CDC criteria for self quarantine and return to work guidelines. Discussed with patient possibility for false negative for COVID testing.     ER precautions discussed.  Encouraged supportive treatment as discussed below and re-evaluation for any new or worsening concerns.    Tylenol/Ibuprofen per packages instructions for fever/aches.  Cool mist humidifier to ease sinus and chest congestion as well as kill flu viruses and germs in the home. A humidity level between 40%-50% in the home will help to curb the spread of germs.    Return to work 03/30/2023   
normal balance

## 2023-06-19 ENCOUNTER — APPOINTMENT (OUTPATIENT)
Dept: ELECTROPHYSIOLOGY | Facility: CLINIC | Age: 88
End: 2023-06-19
Payer: MEDICARE

## 2023-06-20 ENCOUNTER — NON-APPOINTMENT (OUTPATIENT)
Age: 88
End: 2023-06-20

## 2023-06-20 ENCOUNTER — INPATIENT (INPATIENT)
Facility: HOSPITAL | Age: 88
LOS: 2 days | Discharge: HOME HEALTH | DRG: 811 | End: 2023-06-23
Attending: INTERNAL MEDICINE | Admitting: INTERNAL MEDICINE
Payer: MEDICARE

## 2023-06-20 VITALS
OXYGEN SATURATION: 99 % | SYSTOLIC BLOOD PRESSURE: 167 MMHG | RESPIRATION RATE: 18 BRPM | DIASTOLIC BLOOD PRESSURE: 82 MMHG | HEART RATE: 68 BPM | WEIGHT: 149.91 LBS | HEIGHT: 62 IN | TEMPERATURE: 98 F

## 2023-06-20 DIAGNOSIS — Z95.2 PRESENCE OF PROSTHETIC HEART VALVE: Chronic | ICD-10-CM

## 2023-06-20 DIAGNOSIS — D64.9 ANEMIA, UNSPECIFIED: ICD-10-CM

## 2023-06-20 DIAGNOSIS — Z98.890 OTHER SPECIFIED POSTPROCEDURAL STATES: Chronic | ICD-10-CM

## 2023-06-20 DIAGNOSIS — Z90.49 ACQUIRED ABSENCE OF OTHER SPECIFIED PARTS OF DIGESTIVE TRACT: Chronic | ICD-10-CM

## 2023-06-20 DIAGNOSIS — H26.9 UNSPECIFIED CATARACT: Chronic | ICD-10-CM

## 2023-06-20 DIAGNOSIS — Z90.89 ACQUIRED ABSENCE OF OTHER ORGANS: Chronic | ICD-10-CM

## 2023-06-20 DIAGNOSIS — Z95.0 PRESENCE OF CARDIAC PACEMAKER: Chronic | ICD-10-CM

## 2023-06-20 LAB
ADD ON TEST-SPECIMEN IN LAB: SIGNIFICANT CHANGE UP
ALBUMIN SERPL ELPH-MCNC: 3.1 G/DL — LOW (ref 3.3–5)
ALP SERPL-CCNC: 111 U/L — SIGNIFICANT CHANGE UP (ref 40–120)
ALT FLD-CCNC: 13 U/L — SIGNIFICANT CHANGE UP (ref 12–78)
ANION GAP SERPL CALC-SCNC: 7 MMOL/L — SIGNIFICANT CHANGE UP (ref 5–17)
ANISOCYTOSIS BLD QL: SLIGHT — SIGNIFICANT CHANGE UP
APTT BLD: 29.1 SEC — SIGNIFICANT CHANGE UP (ref 27.5–35.5)
AST SERPL-CCNC: 13 U/L — LOW (ref 15–37)
BASO STIPL BLD QL SMEAR: PRESENT — SIGNIFICANT CHANGE UP
BASOPHILS # BLD AUTO: 0.02 K/UL — SIGNIFICANT CHANGE UP (ref 0–0.2)
BASOPHILS NFR BLD AUTO: 0.4 % — SIGNIFICANT CHANGE UP (ref 0–2)
BILIRUB SERPL-MCNC: 0.3 MG/DL — SIGNIFICANT CHANGE UP (ref 0.2–1.2)
BLD GP AB SCN SERPL QL: SIGNIFICANT CHANGE UP
BUN SERPL-MCNC: 22 MG/DL — SIGNIFICANT CHANGE UP (ref 7–23)
CALCIUM SERPL-MCNC: 9.1 MG/DL — SIGNIFICANT CHANGE UP (ref 8.5–10.1)
CHLORIDE SERPL-SCNC: 106 MMOL/L — SIGNIFICANT CHANGE UP (ref 96–108)
CO2 SERPL-SCNC: 24 MMOL/L — SIGNIFICANT CHANGE UP (ref 22–31)
CREAT SERPL-MCNC: 0.99 MG/DL — SIGNIFICANT CHANGE UP (ref 0.5–1.3)
EGFR: 54 ML/MIN/1.73M2 — LOW
EOSINOPHIL # BLD AUTO: 0.12 K/UL — SIGNIFICANT CHANGE UP (ref 0–0.5)
EOSINOPHIL NFR BLD AUTO: 2.6 % — SIGNIFICANT CHANGE UP (ref 0–6)
GLUCOSE SERPL-MCNC: 82 MG/DL — SIGNIFICANT CHANGE UP (ref 70–99)
HCT VFR BLD CALC: 20 % — CRITICAL LOW (ref 34.5–45)
HGB BLD-MCNC: 5.8 G/DL — CRITICAL LOW (ref 11.5–15.5)
HYPOCHROMIA BLD QL: SIGNIFICANT CHANGE UP
IMM GRANULOCYTES NFR BLD AUTO: 0.4 % — SIGNIFICANT CHANGE UP (ref 0–0.9)
INR BLD: 1.02 RATIO — SIGNIFICANT CHANGE UP (ref 0.88–1.16)
LACTATE SERPL-SCNC: 0.8 MMOL/L — SIGNIFICANT CHANGE UP (ref 0.7–2)
LYMPHOCYTES # BLD AUTO: 1.1 K/UL — SIGNIFICANT CHANGE UP (ref 1–3.3)
LYMPHOCYTES # BLD AUTO: 23.8 % — SIGNIFICANT CHANGE UP (ref 13–44)
MANUAL SMEAR VERIFICATION: SIGNIFICANT CHANGE UP
MCHC RBC-ENTMCNC: 21.6 PG — LOW (ref 27–34)
MCHC RBC-ENTMCNC: 29 GM/DL — LOW (ref 32–36)
MCV RBC AUTO: 74.3 FL — LOW (ref 80–100)
MICROCYTES BLD QL: SIGNIFICANT CHANGE UP
MONOCYTES # BLD AUTO: 0.42 K/UL — SIGNIFICANT CHANGE UP (ref 0–0.9)
MONOCYTES NFR BLD AUTO: 9.1 % — SIGNIFICANT CHANGE UP (ref 2–14)
NEUTROPHILS # BLD AUTO: 2.94 K/UL — SIGNIFICANT CHANGE UP (ref 1.8–7.4)
NEUTROPHILS NFR BLD AUTO: 63.7 % — SIGNIFICANT CHANGE UP (ref 43–77)
OVALOCYTES BLD QL SMEAR: SLIGHT — SIGNIFICANT CHANGE UP
PLAT MORPH BLD: NORMAL — SIGNIFICANT CHANGE UP
PLATELET # BLD AUTO: 240 K/UL — SIGNIFICANT CHANGE UP (ref 150–400)
POIKILOCYTOSIS BLD QL AUTO: SLIGHT — SIGNIFICANT CHANGE UP
POLYCHROMASIA BLD QL SMEAR: SLIGHT — SIGNIFICANT CHANGE UP
POTASSIUM SERPL-MCNC: 4.8 MMOL/L — SIGNIFICANT CHANGE UP (ref 3.5–5.3)
POTASSIUM SERPL-SCNC: 4.8 MMOL/L — SIGNIFICANT CHANGE UP (ref 3.5–5.3)
PROT SERPL-MCNC: 7.3 GM/DL — SIGNIFICANT CHANGE UP (ref 6–8.3)
PROTHROM AB SERPL-ACNC: 11.8 SEC — SIGNIFICANT CHANGE UP (ref 10.5–13.4)
RBC # BLD: 2.69 M/UL — LOW (ref 3.8–5.2)
RBC # FLD: 15.9 % — HIGH (ref 10.3–14.5)
RBC BLD AUTO: ABNORMAL
SODIUM SERPL-SCNC: 137 MMOL/L — SIGNIFICANT CHANGE UP (ref 135–145)
WBC # BLD: 4.62 K/UL — SIGNIFICANT CHANGE UP (ref 3.8–10.5)
WBC # FLD AUTO: 4.62 K/UL — SIGNIFICANT CHANGE UP (ref 3.8–10.5)

## 2023-06-20 PROCEDURE — P9016: CPT

## 2023-06-20 PROCEDURE — 85027 COMPLETE CBC AUTOMATED: CPT

## 2023-06-20 PROCEDURE — 93296 REM INTERROG EVL PM/IDS: CPT

## 2023-06-20 PROCEDURE — 36415 COLL VENOUS BLD VENIPUNCTURE: CPT

## 2023-06-20 PROCEDURE — 83550 IRON BINDING TEST: CPT

## 2023-06-20 PROCEDURE — 82746 ASSAY OF FOLIC ACID SERUM: CPT

## 2023-06-20 PROCEDURE — 36430 TRANSFUSION BLD/BLD COMPNT: CPT

## 2023-06-20 PROCEDURE — 83010 ASSAY OF HAPTOGLOBIN QUANT: CPT

## 2023-06-20 PROCEDURE — 83615 LACTATE (LD) (LDH) ENZYME: CPT

## 2023-06-20 PROCEDURE — 93010 ELECTROCARDIOGRAM REPORT: CPT

## 2023-06-20 PROCEDURE — 84443 ASSAY THYROID STIM HORMONE: CPT

## 2023-06-20 PROCEDURE — 82607 VITAMIN B-12: CPT

## 2023-06-20 PROCEDURE — 83540 ASSAY OF IRON: CPT

## 2023-06-20 PROCEDURE — 85025 COMPLETE CBC W/AUTO DIFF WBC: CPT

## 2023-06-20 PROCEDURE — 93306 TTE W/DOPPLER COMPLETE: CPT

## 2023-06-20 PROCEDURE — 80048 BASIC METABOLIC PNL TOTAL CA: CPT

## 2023-06-20 PROCEDURE — 97116 GAIT TRAINING THERAPY: CPT | Mod: GP

## 2023-06-20 PROCEDURE — 97530 THERAPEUTIC ACTIVITIES: CPT | Mod: GP

## 2023-06-20 PROCEDURE — 99285 EMERGENCY DEPT VISIT HI MDM: CPT

## 2023-06-20 PROCEDURE — 99497 ADVNCD CARE PLAN 30 MIN: CPT | Mod: 25

## 2023-06-20 PROCEDURE — 93294 REM INTERROG EVL PM/LDLS PM: CPT

## 2023-06-20 PROCEDURE — 82728 ASSAY OF FERRITIN: CPT

## 2023-06-20 PROCEDURE — 97162 PT EVAL MOD COMPLEX 30 MIN: CPT | Mod: GP

## 2023-06-20 PROCEDURE — 99223 1ST HOSP IP/OBS HIGH 75: CPT

## 2023-06-20 RX ORDER — HYDRALAZINE HCL 50 MG
10 TABLET ORAL EVERY 6 HOURS
Refills: 0 | Status: DISCONTINUED | OUTPATIENT
Start: 2023-06-20 | End: 2023-06-23

## 2023-06-20 RX ORDER — METOPROLOL TARTRATE 50 MG
25 TABLET ORAL DAILY
Refills: 0 | Status: DISCONTINUED | OUTPATIENT
Start: 2023-06-20 | End: 2023-06-23

## 2023-06-20 RX ORDER — ERGOCALCIFEROL 1.25 MG/1
50000 CAPSULE ORAL
Refills: 0 | Status: DISCONTINUED | OUTPATIENT
Start: 2023-06-20 | End: 2023-06-23

## 2023-06-20 RX ORDER — ONDANSETRON 8 MG/1
4 TABLET, FILM COATED ORAL EVERY 8 HOURS
Refills: 0 | Status: DISCONTINUED | OUTPATIENT
Start: 2023-06-20 | End: 2023-06-23

## 2023-06-20 RX ORDER — OMEPRAZOLE 10 MG/1
1 CAPSULE, DELAYED RELEASE ORAL
Refills: 0 | DISCHARGE

## 2023-06-20 RX ORDER — PREGABALIN 225 MG/1
1 CAPSULE ORAL
Qty: 0 | Refills: 0 | DISCHARGE

## 2023-06-20 RX ORDER — FERROUS SULFATE 325(65) MG
325 TABLET ORAL DAILY
Refills: 0 | Status: DISCONTINUED | OUTPATIENT
Start: 2023-06-20 | End: 2023-06-23

## 2023-06-20 RX ORDER — ERGOCALCIFEROL 1.25 MG/1
1 CAPSULE ORAL
Refills: 0 | DISCHARGE

## 2023-06-20 RX ORDER — SERTRALINE 25 MG/1
1 TABLET, FILM COATED ORAL
Qty: 0 | Refills: 0 | DISCHARGE

## 2023-06-20 RX ORDER — LANOLIN ALCOHOL/MO/W.PET/CERES
3 CREAM (GRAM) TOPICAL AT BEDTIME
Refills: 0 | Status: DISCONTINUED | OUTPATIENT
Start: 2023-06-20 | End: 2023-06-23

## 2023-06-20 RX ORDER — PREGABALIN 225 MG/1
1000 CAPSULE ORAL DAILY
Refills: 0 | Status: DISCONTINUED | OUTPATIENT
Start: 2023-06-20 | End: 2023-06-22

## 2023-06-20 RX ORDER — LEVOTHYROXINE SODIUM 125 MCG
1 TABLET ORAL
Refills: 0 | DISCHARGE

## 2023-06-20 RX ORDER — ACETAMINOPHEN 500 MG
650 TABLET ORAL EVERY 6 HOURS
Refills: 0 | Status: DISCONTINUED | OUTPATIENT
Start: 2023-06-20 | End: 2023-06-23

## 2023-06-20 RX ORDER — LEVOTHYROXINE SODIUM 125 MCG
112 TABLET ORAL DAILY
Refills: 0 | Status: DISCONTINUED | OUTPATIENT
Start: 2023-06-20 | End: 2023-06-23

## 2023-06-20 RX ORDER — LEVOTHYROXINE SODIUM 125 MCG
1 TABLET ORAL
Qty: 0 | Refills: 0 | DISCHARGE

## 2023-06-20 RX ORDER — AMLODIPINE BESYLATE 2.5 MG/1
5 TABLET ORAL DAILY
Refills: 0 | Status: ACTIVE | OUTPATIENT
Start: 2023-06-20 | End: 2024-05-18

## 2023-06-20 RX ORDER — OMEPRAZOLE 10 MG/1
1 CAPSULE, DELAYED RELEASE ORAL
Qty: 0 | Refills: 0 | DISCHARGE

## 2023-06-20 RX ORDER — PANTOPRAZOLE SODIUM 20 MG/1
40 TABLET, DELAYED RELEASE ORAL
Refills: 0 | Status: DISCONTINUED | OUTPATIENT
Start: 2023-06-20 | End: 2023-06-23

## 2023-06-20 RX ORDER — POLYETHYLENE GLYCOL 3350 17 G/17G
17 POWDER, FOR SOLUTION ORAL DAILY
Refills: 0 | Status: DISCONTINUED | OUTPATIENT
Start: 2023-06-20 | End: 2023-06-23

## 2023-06-20 RX ADMIN — Medication 30 MILLILITER(S): at 21:05

## 2023-06-20 NOTE — ED PROVIDER NOTE - HEME LYMPH, MLM
Chief Complaint: [Right leg pain worse last 3 days]     HPI: [Patient complains of increased pain in the right upper thigh and swelling to the right ankle for the past 3 days. She has a history of DVT and is on blood thinners. She did not take her meds today. She also states she fell onto her left rib 6 days ago and has continued pain]     Vital Signs   Patient Vitals for the past 4 hrs:   Temp Pulse Resp BP SpO2   11/16/22 1614 98.7 °F (37.1 °C) (!) 107 15 (!) 96/52 99 %        Past Medical History:   Diagnosis Date    Anemia     Arrhythmia     Diagnosed 2010, patient believes now resolved. Patient has no cardiologist    Arthritis     Osteoarthritis diagnosed [id number]    Asthma     Diagnosed- can't recall. Carpal tunnel syndrome     left    Chronic obstructive pulmonary disease (HCC)     Chronic pain     diagnosed 1994 severe fibromyalgia and 2001 infarcts pleurisy from PE.    DDD (degenerative disc disease), cervical     Depression     Diagnosed 2002     Dysphagia     Facial abscess     Factor 5 Leiden mutation, heterozygous (HCC)     Fibromyalgia     Gait instability     GERD (gastroesophageal reflux disease)     Diagnosed 2001. Hypercholesterolemia     Diagnosed 2011. Hypertension     Diagnose 2001     Lung nodule     Multiple falls     Other ill-defined conditions(799.89)     IBS< Fibra    PTSD (post-traumatic stress disorder)     Scoliosis     Thromboembolus ([address]     Diagnosed 2001 DVT    Thyroid disease     hypothryroidism diagnosed approx 2013. Trauma     PTSD diagnosed 1999. Past Surgical History:   Procedure Laterality Date    COLONOSCOPY N/A 4/15/2019    COLONOSCOPY BMI 30 performed by Marcia Howe MD at [facility] END[facility]    GYN      2 D&C due to miscarriages. Year 1984 and 1992.     HEENT  1978    3 lumps removed from neck    ORTHOPEDIC SURGERY Left 2018    carpal tunnel x 2    ORTHOPEDIC SURGERY Right     carpal tunel    ORTHOPEDIC SURGERY Right     trigger fingers    ORTHOPEDIC SURGERY Right 2013    transfer tendon        Family History   Problem Relation Age of Onset    No Known Problems Father     No Known Problems Paternal Grandfather     No Known Problems Paternal Grandmother     No Known Problems Brother     Clotting Disorder Mother     Cancer Maternal Grandfather     Heart Disease Maternal Grandfather     Clotting Disorder Maternal Grandfather     Cancer Maternal Grandmother     Cancer Maternal Uncle     Clotting Disorder Maternal Aunt         Social History     Socioeconomic History    Marital status:    Tobacco Use    Smoking status: Former     Packs/day: 1.00     Years: 45.00     Pack years: 45.00     Types: Cigarettes     Quit date: 2016     Years since quittin.2    Smokeless tobacco: Never    Tobacco comments:     Currently Vape   Substance and Sexual Activity    Alcohol use: No     Alcohol/week: 0.0 standard drinks    Drug use: No        Allergies: Ciprofloxacin and Codeine    Previous Medications    ALBUTEROL SULFATE HFA (VENTOLIN HFA) 108 (90 BASE) MCG/ACT INHALER    Inhale 2 puffs into the lungs every 4 hours as needed for Wheezing INHALE 2 PUFFS BY MOUTH EVERY 4 HOURS AS NEEDED FOR WHEEZING    ALPRAZOLAM (XANAX) 1 MG TABLET    Take 1 mg by mouth 3 times daily as needed. AMLODIPINE (NORVASC) 5 MG TABLET    Take 1 tablet by mouth in the morning. ASCORBIC ACID (VITAMIN C) 500 MG TABLET    Take 500 mg by mouth daily    ATORVASTATIN (LIPITOR) 20 MG TABLET    Take 1 tablet by mouth in the morning.     BACLOFEN (LIORESAL) 10 MG TABLET    Take 1 tablet by mouth 3 times daily    BENZONATATE (TESSALON) 200 MG CAPSULE    Take 1 capsule by mouth three times daily as needed for cough    BENZONATATE (TESSALON) 200 MG CAPSULE    Take 1 capsule by mouth 3 times daily as needed for Cough    BIOTIN 2.5 MG CAPS    Take 1 tablet by mouth daily    BUPROPION (WELLBUTRIN XL) 300 MG EXTENDED RELEASE TABLET    Take 1 tablet by mouth every morning    CALCIUM PO    Take by mouth CETIRIZINE (ZYRTEC) 10 MG TABLET    Take 10 mg by mouth    CETIRIZINE (ZYRTEC) 10 MG TABLET    Take 1 tablet by mouth daily    CHOLECALCIFEROL 50 MCG (2000 UT) CAPS    Take 2,000 Units by mouth daily    CYANOCOBALAMIN ER 1000 MCG TBCR    Take 1 tablet by mouth daily    DULOXETINE (CYMBALTA) 60 MG EXTENDED RELEASE CAPSULE    Take 1 capsule by mouth in the morning. ERGOCALCIFEROL (ERGOCALCIFEROL) 1.25 MG (31994 UT) CAPSULE    Take 1 capsule by mouth once a week Take 1 capsule by mouth once a week    FAMOTIDINE (PEPCID) 20 MG TABLET    Take 40 mg by mouth daily    FLUTICASONE (FLONASE) 50 MCG/ACT NASAL SPRAY    USE 2 SPRAY(S) IN EACH NOSTRIL ONCE DAILY AS NEEDED    FLUTICASONE FUROATE-VILANTEROL (BREO ELLIPTA) 200-25 MCG/INH AEPB INHALER    Inhale 1 puff into the lungs daily    FOLIC ACID (FOLVITE) 083 MCG TABLET    Take 1 tablet by mouth in the morning. GABAPENTIN (NEURONTIN) 300 MG CAPSULE    Take 1 capsule by mouth 3 times daily for 30 days. GUAIFENESIN (MUCINEX) 600 MG EXTENDED RELEASE TABLET    Take 2 tablets by mouth twice daily    GUAIFENESIN (MUCINEX) 600 MG EXTENDED RELEASE TABLET    Take 1 tablet by mouth 2 times daily    HYDROCHLOROTHIAZIDE (HYDRODIURIL) 25 MG TABLET    Take 1 tablet by mouth in the morning. IPRATROPIUM (ATROVENT) 0.03 % NASAL SPRAY    USE 2 SPRAY(S) IN EACH NOSTRIL TWICE DAILY    IPRATROPIUM-ALBUTEROL (DUONEB) 0.5-2.5 (3) MG/3ML SOLN NEBULIZER SOLUTION    Take 3 mLs by nebulization every 4 hours as needed for Shortness of Breath    KETOTIFEN (ZADITOR) 0.025 % OPHTHALMIC SOLUTION    INSTILL 1 DROPS INTO EACH EYE TWICE DAILY FOR 10 DAYS    LORATADINE (CLARITIN) 10 MG TABLET    Take 10 mg by mouth daily    LUBIPROSTONE (AMITIZA) 24 MCG CAPSULE    TAKE 1 CAPSULE BY MOUTH TWICE DAILY WITH MEALS    MORPHINE (JAMSHID) 20 MG EXTENDED RELEASE CAPSULE    Take 20 mg by mouth every 24 hours.     OLOPATADINE (PATANASE) 0.6 % SOLN NASSL SOLN    by Nasal route    OMEGA-3 ACID ETHYL ESTERS (LOVAZA) 1 G CAPSULE    TAKE 2 CAPS BY MOUTH TWO TIMES A DAY    OMEGA-3 FATTY ACIDS (FISH OIL) 1000 MG CAPS    Take 2 capsules by mouth twice daily    OMEPRAZOLE (PRILOSEC) 40 MG DELAYED RELEASE CAPSULE    Take 1 capsule by mouth in the morning. POLYETHYLENE GLYCOL (GLYCOLAX) 17 GM/SCOOP POWDER    Take 17 g by mouth daily as needed (constipation)    POTASSIUM CHLORIDE (KLOR-CON M) 20 MEQ EXTENDED RELEASE TABLET    2 twice daily    PREDNISONE (DELTASONE) 5 MG TABLET    Take 1 tablet by mouth daily (with breakfast)    RIVAROXABAN (XARELTO) 20 MG TABS TABLET    Take 1 tablet by mouth daily TAKE 1 TABLET BY MOUTH ONCE DAILY    SYNTHROID 50 MCG TABLET    Take 1 tablet by mouth Daily    TRAMADOL (ULTRAM) 50 MG TABLET    Take 50 mg by mouth 3 times daily as needed. Brief PE:  GEN: [No distress.]  CV: As per triage vitals  PULM: [Breathing comfortably]  ABD: [No distention]  NEURO: [Awake, Alert]     Impression: Worsening right leg pain and left rib pain     Plan: Labs rib x-ray and ultrasound    Patient evaluated initially in triage. Rapid Medical Evaluation was conducted and necessary orders have been placed. I have performed a medical screening exam.  Care will now be transferred to the provider in the back of the emergency department.   Severa Fort, PA 4:20 PM       Jc Zuniga  11/16/22 0037 No adenopathy or splenomegaly. No cervical or inguinal lymphadenopathy.

## 2023-06-20 NOTE — ED ADULT TRIAGE NOTE - CHIEF COMPLAINT QUOTE
pt BIBEMS from home for low H&H. as per EMS pt MD called pt with blood work results of low hemoglobin unsure of value. pt alert and oriented. as per EMS pt visiting nurse saw pt and had +orthostatics.

## 2023-06-20 NOTE — H&P ADULT - NSHPSOCIALHISTORY_GEN_ALL_CORE
Lives with her daughter Dominga. Needs assistance with ADLs and IADLs. Smoked but quit many years ago. Denies alcohol and drug use.

## 2023-06-20 NOTE — PATIENT PROFILE ADULT - HAVE YOU EXPERIENCED VIOLENCE OR A TRAUMATIC EVENT?
Detail Level: Simple Additional Notes: Discussed PDT vs cream RX. Explained that it is reasonable to move onto efudex RX but he may have a more impressive reaction. Discussed pt Polyneuropathy. Explained that either RX can cause pain. Explained that there is a risk that he could not return to baseline and it could become worse but there is no specific statistic. Explained that we could just continue to monitor the skin at this time.\\n\\nA total of 1 lesion was treated with liquid nitrogen for 1 freeze-thaw cycle lasting 5 seconds, located on the left superior parietal scalp. The patient's consent was obtained including but not limited to risks of pain, crusting, scabbing, blistering, scarring, darker or lighter pigmentary change, recurrence, incomplete removal and infection.\\n\\nF/u 6 months. no

## 2023-06-20 NOTE — H&P ADULT - CONVERSATION DETAILS
The patient is A+Ox2 with waxing and waning confusion at the time of my evaluation and does not have full capacity to make an informed decision or good understanding of the risks associated with the decision being made. I spoke to the patient's daughter/next of kin, Dominga Sawyer, who stated the patient would want to be DNR/DNI. She does not wish for the pt to have any invasive procedures. Her siblings are also in agreement. Old MOLST voided. New MOLST form and capacity documentation completed and placed into chart.

## 2023-06-20 NOTE — H&P ADULT - NSICDXPASTSURGICALHX_GEN_ALL_CORE_FT
PAST SURGICAL HISTORY:  Bilateral cataracts     H/O brain surgery subdural  bleed,  had  chris holes    H/O hand surgery     History of cholecystectomy     History of tonsillectomy     Pacemaker     S/P TAVR (transcatheter aortic valve replacement)

## 2023-06-20 NOTE — PHARMACOTHERAPY INTERVENTION NOTE - COMMENTS
Medication reconciliation completed.  Patient was unable to provide medication information, spoke to daughter Dominga ([phone]) and they provided current medication list; confirmed with Dr. First [facility].

## 2023-06-20 NOTE — H&P ADULT - NSHPPHYSICALEXAM_GEN_ALL_CORE
ICU Vital Signs Last 24 Hrs  T(C): 36.6 (20 Jun 2023 22:54), Max: 36.9 (20 Jun 2023 12:22)  T(F): 97.9 (20 Jun 2023 22:54), Max: 98.4 (20 Jun 2023 12:22)  HR: 74 (20 Jun 2023 22:54) (62 - 74)  BP: 167/60 (20 Jun 2023 22:54) (151/62 - 175/68)  BP(mean): 89 (20 Jun 2023 22:54) (89 - 96)  RR: 18 (20 Jun 2023 22:54) (17 - 18)  SpO2: 97% (20 Jun 2023 22:54) (97% - 100%)    O2 Parameters below as of 20 Jun 2023 22:54  Patient On (Oxygen Delivery Method): room air    General: Awake and alert, cooperative with exam. No acute distress.   Skin: Warm, dry, and pink.   Eyes: Pupils equal and reactive to light. Extraocular eye movements intact. No conjunctival injection, discharge, or scleral icterus.   HEENT: Atraumatic, normocephalic. Moist mucus membranes.   Cardiology: Normal S1, S2. No murmurs, rubs, or gallops. Regular rate and rhythm.   Respiratory: Lungs clear to ascultation bilaterally. Good air exchange. No wheezes, rales, or rhonchi. Normal chest expansion.   Gastrointestinal: Positive bowel sounds. Soft, non-tender, non-distended. No guarding, rigidity, or rebound tenderness. No hepatosplenomegaly.   Genitourinary: Chaperone at the bedside for JEFFRY, occult negative. External hemorrhoids present.   Musculoskeletal: 5/5 motor strength in all extremities. Normal range of motion.   Extremities: No peripheral edema bilaterally. Dorsalis pedis pulses 2+ bilaterally.   Neurological: A+Ox2 (person, place) with waxing and waning confusion. Cranial nerves 2-12 intact. Normal speech. No facial droop. No focal neurological deficits. 5/5 motor strength in all extremities.  Psychiatric: Normal affect. Normal mood.

## 2023-06-20 NOTE — PATIENT PROFILE ADULT - FUNCTIONAL ASSESSMENT - BASIC MOBILITY 6.
3-calculated by average/Not able to assess (calculate score using [facility] averaging method)

## 2023-06-20 NOTE — H&P ADULT - HISTORY OF PRESENT ILLNESS
89 y/o F with PMH HTN, HLD, AS s/p TAVR, MR, PPM, Hypothyroidism, GERD, OA/RA, intracranial hemorrhage s/p Ysabel hole presented with abnormal outpt labs. Pt was a poor historian and unable to say most of the history, she is A+Ox2 with some confusion. I spoke to her daughter, Dominga Sawyer, on the phone who stated that the pt had bloodwork yesterday morning and her Hb was found to be 6.2. No recent blood in the stool, dark stools, or bleeding. Pt has had nose bleeds but the pt's daughter attributes this to her picking her nose. She did have an episode in the last 2 weeks of near syncope. She has been more confused lately (putting her shirt on incorrectly or not remembering what she has to do in the bathroom). Was never diagnosed with dementia but was previously prescribed Aricept for memory issues. Denies fevers, chills, chest pain, SOB, abdominal pain, N/V, diarrhea/constipation.     Prior admission:  - 4/25/22: syncope/near syncope, thrombocytopenia -> pt wished to go home   - 10/7/22: severe microcytic anemia, unable to r/o malginancy, family refused endoscopic evaluation a tthe time    ER course: //66. Labs: Hb 5.8 (baseline ~8), MCV 74.3, albumin 3.1.     EKG: Sinus rhythm with sinus arrhythmia HR 60 bpm, MD prolonged 214 ms, 1st degree AV block, no ST segment changes, T wave inversions in lead III (personally reviewed).     Pt was given 2 units of PRBCs. She is being admitted to med/surg for further management.

## 2023-06-20 NOTE — ED ADULT NURSE NOTE - OBJECTIVE STATEMENT
BIB EMS, MD CALLED PT H&H 6.2. PT A&Ox3 SPEAKING IN FULL AND COMPLETE SENTENCES. DENIES CP/SOB/N/V/D/FEVER/CHILLS. PT PLACED ON CARDIAC MONITOR WCTM. dAUGHTER AT BEDSIDE.

## 2023-06-20 NOTE — ED PROVIDER NOTE - CLINICAL SUMMARY MEDICAL DECISION MAKING FREE TEXT BOX
89F w/ PMHx of HTN, HLD, AS s/p TAVR, MR, PPM, Hypothyroidism, GERD, OA/RA (On Prednisone), presented to ED BIBEMS from home for low H&H. As per EMS pt MD called pt with blood work results from yesterday of low hemoglobin.

## 2023-06-20 NOTE — ED PROVIDER NOTE - CONSTITUTIONAL, MLM
normal... Pale, Well appearing, awake, alert, oriented to person, place, time/situation and in no apparent distress.

## 2023-06-20 NOTE — ED PROVIDER NOTE - OBJECTIVE STATEMENT
89F w/ PMHx of HTN, HLD, AS s/p TAVR, MR, PPM, Hypothyroidism, GERD, OA/RA (On Prednisone), pacemaker presented to ED BIBEMS from home for low H&H. As per EMS pt MD called pt with blood work results from yesterday of low hemoglobin of 6.2. Pt states she experiencing slight SOB. Last transfusion was done on october. Denies black stools.

## 2023-06-20 NOTE — H&P ADULT - ASSESSMENT
89 y/o F presented with low Hb     1. Severe microcytic anemia   - Admit to med/surg   - Hb 5.8 (baseline ~8), MCV 74.3, s/p 2 units of PRBCs   - Stool occult negative upon my evaluation   - Transfuse for Hb < 7   - Ordered iron studies, ferritin   - I spoke to the pt's daughter regarding further interventions such as EGD and colonoscopy and they do not wish to puruse any invasive testing at this time     2. Uncontrolled HTN   - //66, monitor   - Hydralazine for SBP > 160   - Start amlodipine 5 mg daily and titrate up as tolerated     3. Acute metabolic encephalopathy likely secondary to anemia vs. metabolic   - Pt's daughter states the pt has become confused previously when she is anemic, she would like to see how she does have receiving blood before acquiring any imaging studies   - I did recommend CT head if persistence of confusion given hx of ICH and chris hole   - Ordered B12, folate, TSH   - PT evaluation     4. Near syncope likely secondary to anemia   - Monitor on remote telemetry   - Orthostatic vital signs  - ECHO ordered   - EKG: NSR with sinus arrhythmia HR 60 bpm, SD prolonged 214 ms, 1st degree AV block  - EP consult - Dr. Dean (PPM interrogation)     5. Hypoalbuminemia   - Albumin 3.1  - Nutrition consult     6. History of HTN, HLD, AS s/p TAVR, MR, PPM, Hypothyroidism, GERD, OA/RA, intracranial hemorrhage s/p Chris hole   - c/w home medications; verified with pt at the bedside     DVT ppx: SCDs   Code status: DNR/DNI   Emergency contact: Dominga Sawyer (daughter) [phone]    I spent a total of 80 minutes on the date of this encounter coordinating the patient's care. This includes reviewing prior documentation, results and imaging in addition to completing a full history and physical examination on the patient. Further tests, medications, and procedures have been ordered as indicated. Laboratory results and the plan of care were communicated to the patient and/or their family member. Supporting documentation was completed and added to the patient's chart.  89 y/o F presented with low Hb     1. Severe microcytic anemia   - Admit to med/surg   - Hb 5.8 (baseline ~8), MCV 74.3, s/p 2 units of PRBCs   - Stool occult negative upon my evaluation   - Transfuse for Hb < 7   - Ordered iron studies, ferritin, LDH, haptoglobin  - I spoke to the pt's daughter regarding further interventions such as EGD and colonoscopy and they do not wish to pursue any invasive testing at this time. Her family does not want further workup for malignancy including CT chest/abd/pelvis or biopsies at this time.     2. Uncontrolled HTN   - //66, monitor   - Hydralazine for SBP > 160   - Start amlodipine 5 mg daily and titrate up as tolerated     3. Acute metabolic encephalopathy likely secondary to anemia vs. metabolic   - Pt's daughter states the pt has become confused previously when she is anemic, she would like to see how she does have receiving blood before acquiring any imaging studies   - I did recommend CT head if persistence of confusion given hx of ICH and chris hole   - Ordered B12, folate, TSH   - PT evaluation     4. Near syncope likely secondary to anemia   - Monitor on remote telemetry   - Orthostatic vital signs  - ECHO ordered   - EKG: NSR with sinus arrhythmia HR 60 bpm, NJ prolonged 214 ms, 1st degree AV block  - EP consult - Dr. Dean (PPM interrogation)     5. Hypoalbuminemia   - Albumin 3.1  - Nutrition consult     6. History of HTN, HLD, AS s/p TAVR, MR, PPM, Hypothyroidism, GERD, OA/RA, intracranial hemorrhage s/p Chris hole   - c/w home medications; verified with pt at the bedside     DVT ppx: SCDs   Code status: DNR/DNI   Emergency contact: Dominga Sawyer (daughter) [phone]    I spent a total of 80 minutes on the date of this encounter coordinating the patient's care. This includes reviewing prior documentation, results and imaging in addition to completing a full history and physical examination on the patient. Further tests, medications, and procedures have been ordered as indicated. Laboratory results and the plan of care were communicated to the patient and/or their family member. Supporting documentation was completed and added to the patient's chart.

## 2023-06-21 ENCOUNTER — TRANSCRIPTION ENCOUNTER (OUTPATIENT)
Age: 88
End: 2023-06-21

## 2023-06-21 LAB
ANION GAP SERPL CALC-SCNC: 5 MMOL/L — SIGNIFICANT CHANGE UP (ref 5–17)
BASOPHILS # BLD AUTO: 0.04 K/UL — SIGNIFICANT CHANGE UP (ref 0–0.2)
BASOPHILS NFR BLD AUTO: 0.6 % — SIGNIFICANT CHANGE UP (ref 0–2)
BUN SERPL-MCNC: 27 MG/DL — HIGH (ref 7–23)
CALCIUM SERPL-MCNC: 9.1 MG/DL — SIGNIFICANT CHANGE UP (ref 8.5–10.1)
CHLORIDE SERPL-SCNC: 108 MMOL/L — SIGNIFICANT CHANGE UP (ref 96–108)
CO2 SERPL-SCNC: 23 MMOL/L — SIGNIFICANT CHANGE UP (ref 22–31)
CREAT SERPL-MCNC: 1.1 MG/DL — SIGNIFICANT CHANGE UP (ref 0.5–1.3)
EGFR: 47 ML/MIN/1.73M2 — LOW
EOSINOPHIL # BLD AUTO: 0.13 K/UL — SIGNIFICANT CHANGE UP (ref 0–0.5)
EOSINOPHIL NFR BLD AUTO: 1.8 % — SIGNIFICANT CHANGE UP (ref 0–6)
FERRITIN SERPL-MCNC: 16 NG/ML — SIGNIFICANT CHANGE UP (ref 15–150)
FOLATE SERPL-MCNC: >20 NG/ML — SIGNIFICANT CHANGE UP
GLUCOSE SERPL-MCNC: 84 MG/DL — SIGNIFICANT CHANGE UP (ref 70–99)
HAPTOGLOB SERPL-MCNC: 194 MG/DL — SIGNIFICANT CHANGE UP (ref 34–200)
HCT VFR BLD CALC: 30.8 % — LOW (ref 34.5–45)
HGB BLD-MCNC: 9.5 G/DL — LOW (ref 11.5–15.5)
IMM GRANULOCYTES NFR BLD AUTO: 0.4 % — SIGNIFICANT CHANGE UP (ref 0–0.9)
IRON SATN MFR SERPL: 12 % — LOW (ref 14–50)
IRON SATN MFR SERPL: 54 UG/DL — SIGNIFICANT CHANGE UP (ref 30–160)
LDH SERPL L TO P-CCNC: 240 U/L — SIGNIFICANT CHANGE UP (ref 84–241)
LYMPHOCYTES # BLD AUTO: 0.98 K/UL — LOW (ref 1–3.3)
LYMPHOCYTES # BLD AUTO: 13.7 % — SIGNIFICANT CHANGE UP (ref 13–44)
MCHC RBC-ENTMCNC: 24.1 PG — LOW (ref 27–34)
MCHC RBC-ENTMCNC: 30.8 GM/DL — LOW (ref 32–36)
MCV RBC AUTO: 78.2 FL — LOW (ref 80–100)
MONOCYTES # BLD AUTO: 0.55 K/UL — SIGNIFICANT CHANGE UP (ref 0–0.9)
MONOCYTES NFR BLD AUTO: 7.7 % — SIGNIFICANT CHANGE UP (ref 2–14)
NEUTROPHILS # BLD AUTO: 5.4 K/UL — SIGNIFICANT CHANGE UP (ref 1.8–7.4)
NEUTROPHILS NFR BLD AUTO: 75.8 % — SIGNIFICANT CHANGE UP (ref 43–77)
PLATELET # BLD AUTO: 229 K/UL — SIGNIFICANT CHANGE UP (ref 150–400)
POTASSIUM SERPL-MCNC: 4.6 MMOL/L — SIGNIFICANT CHANGE UP (ref 3.5–5.3)
POTASSIUM SERPL-SCNC: 4.6 MMOL/L — SIGNIFICANT CHANGE UP (ref 3.5–5.3)
RBC # BLD: 3.94 M/UL — SIGNIFICANT CHANGE UP (ref 3.8–5.2)
RBC # FLD: 18.2 % — HIGH (ref 10.3–14.5)
SODIUM SERPL-SCNC: 136 MMOL/L — SIGNIFICANT CHANGE UP (ref 135–145)
TIBC SERPL-MCNC: 461 UG/DL — HIGH (ref 220–430)
TSH SERPL-MCNC: 1.59 UU/ML — SIGNIFICANT CHANGE UP (ref 0.34–4.82)
UIBC SERPL-MCNC: 407 UG/DL — HIGH (ref 110–370)
VIT B12 SERPL-MCNC: >2000 PG/ML — HIGH (ref 232–1245)
WBC # BLD: 7.13 K/UL — SIGNIFICANT CHANGE UP (ref 3.8–10.5)
WBC # FLD AUTO: 7.13 K/UL — SIGNIFICANT CHANGE UP (ref 3.8–10.5)

## 2023-06-21 PROCEDURE — 93280 PM DEVICE PROGR EVAL DUAL: CPT | Mod: 26

## 2023-06-21 PROCEDURE — 99232 SBSQ HOSP IP/OBS MODERATE 35: CPT

## 2023-06-21 PROCEDURE — 93306 TTE W/DOPPLER COMPLETE: CPT | Mod: 26

## 2023-06-21 RX ADMIN — PANTOPRAZOLE SODIUM 40 MILLIGRAM(S): 20 TABLET, DELAYED RELEASE ORAL at 06:08

## 2023-06-21 RX ADMIN — Medication 25 MILLIGRAM(S): at 10:49

## 2023-06-21 RX ADMIN — Medication 1 DROP(S): at 05:24

## 2023-06-21 RX ADMIN — Medication 1 DROP(S): at 10:52

## 2023-06-21 RX ADMIN — Medication 1 DROP(S): at 18:06

## 2023-06-21 RX ADMIN — AMLODIPINE BESYLATE 5 MILLIGRAM(S): 2.5 TABLET ORAL at 10:50

## 2023-06-21 RX ADMIN — Medication 325 MILLIGRAM(S): at 10:49

## 2023-06-21 RX ADMIN — Medication 1 DROP(S): at 22:50

## 2023-06-21 RX ADMIN — Medication 1 DROP(S): at 01:00

## 2023-06-21 RX ADMIN — Medication 112 MICROGRAM(S): at 05:24

## 2023-06-21 RX ADMIN — PREGABALIN 1000 MICROGRAM(S): 225 CAPSULE ORAL at 10:51

## 2023-06-21 RX ADMIN — Medication 10 MILLIGRAM(S): at 01:00

## 2023-06-21 NOTE — PROGRESS NOTE ADULT - ASSESSMENT
· Assessment	  89 y/o F presented with low Hb     1. Severe microcytic anemia   - Hb 5.8 (baseline ~8), MCV 74.3, s/p 2 units of PRBCs ---- now at 9/30. monitor for now.   - Stool occult negative upon my evaluation   - Transfuse for Hb < 7   - Ordered iron studies, ferritin, LDH, haptoglobin  - conservative treatment for now. no plans for endo/ colo at this time.      2. Uncontrolled HTN   - //66, monitor   - Hydralazine for SBP > 160   - Start amlodipine 5 mg daily and titrate up as tolerated     3. Acute metabolic encephalopathy likely secondary to anemia vs. metabolic   - clinically improving.   - Pt's daughter states the pt has become confused previously when she is anemic, she would like to see how she does have receiving blood before acquiring any imaging studies   - TSH WNL, pending b12 and folate levels     4. Near syncope likely secondary to anemia   - Monitor on remote telemetry   - Orthostatic vital signs  - ECHO - severe mitral calcification, severe mitral stenosis, well seated aortic valve, mild concentric LV hypertrophy, EF ~ 60% , nomal RV ,   - EKG: NSR with sinus arrhythmia HR 60 bpm, OH prolonged 214 ms, 1st degree AV block  - s/p PPM interrogation.     5. Hypoalbuminemia   - Albumin 3.1  - Nutrition consult     6. History of HTN, HLD, AS s/p TAVR, MR, PPM, Hypothyroidism, GERD, OA/RA, intracranial hemorrhage s/p Ysabel hole   - c/w home medications; verified with pt at the bedside     DVT ppx: SCDs   Code status: DNR/DNI   Emergency contact: Dominga Sawyer (daughter) [phone] - left message with callback number.

## 2023-06-21 NOTE — PROCEDURE NOTE - INTERROGATION NOTE: COMMENTS
estimated battery longevity ~4years, brief episodes of Atrial tachy rates recorded - egms reviewed and consistent with far field sensing of r-wave.

## 2023-06-21 NOTE — DIETITIAN INITIAL EVALUATION ADULT - NAME AND PHONE
Dorota Harper RDN, CDN, [facility]      [phone]   [email or web address]

## 2023-06-21 NOTE — DIETITIAN INITIAL EVALUATION ADULT - PERTINENT LABORATORY DATA
06-20    137  |  106  |  22  ----------------------------<  82  4.8   |  24  |  0.99    Ca    9.1      20 Jun 2023 12:42    TPro  7.3  /  Alb  3.1<L>  /  TBili  0.3  /  DBili  x   /  AST  13<L>  /  ALT  13  /  AlkPhos  111  06-20

## 2023-06-21 NOTE — DIETITIAN INITIAL EVALUATION ADULT - ORAL INTAKE PTA/DIET HISTORY
Pt is A&O x 2, does not give history Pty lives at home, reports that daughter cooks and shops. Pt reports eating 3 light/moderate sized meals a day. PO intake estimated < 75% ENN > one month.

## 2023-06-21 NOTE — PHYSICAL THERAPY INITIAL EVALUATION ADULT - DIAGNOSIS, PT EVAL
profound anemia /decreased H/H=5.8/20 (6/20/23) cognitive impairment/memory impairment, prior SDH requiring Ysabel hole decompression

## 2023-06-21 NOTE — DIETITIAN INITIAL EVALUATION ADULT - ADD RECOMMEND
Maintain Current DASH diet  Record PO intake in EMR after each meal (nursing.)  MVI w/ minerals daily to ensure 100% RDA met   Monitor bowel movements, if no BM for >3 days, consider implementing bowel regimen.  Confirm Goals of Care regarding nutrition support   Monitor PO intake, tolerance, labs and weight.  Maintain Current DASH diet  Record PO intake in EMR after each meal (nursing.)  MVI w/ minerals daily to ensure 100% RDA met   Ensure plus bid  Monitor bowel movements, if no BM for >3 days, consider implementing bowel regimen.  Confirm Goals of Care regarding nutrition support   Monitor PO intake, tolerance, labs and weight.

## 2023-06-21 NOTE — DIETITIAN INITIAL EVALUATION ADULT - PERTINENT MEDS FT
MEDICATIONS  (STANDING):  amLODIPine   Tablet 5 milliGRAM(s) Oral daily  artificial  tears Solution 1 Drop(s) Both EYES four times a day  cyanocobalamin 1000 MICROGram(s) Oral daily  ergocalciferol 48534 Unit(s) Oral <User Schedule>  ferrous    sulfate 325 milliGRAM(s) Oral daily  levothyroxine 112 MICROGram(s) Oral daily  metoprolol succinate ER 25 milliGRAM(s) Oral daily  pantoprazole    Tablet 40 milliGRAM(s) Oral before breakfast    MEDICATIONS  (PRN):  acetaminophen     Tablet .. 650 milliGRAM(s) Oral every 6 hours PRN Temp greater or equal to 38C (100.4F), Mild Pain (1 - 3)  aluminum hydroxide/magnesium hydroxide/simethicone Suspension 30 milliLiter(s) Oral every 6 hours PRN Dyspepsia  hydrALAZINE Injectable 10 milliGRAM(s) IV Push every 6 hours PRN SBP > 160  melatonin 3 milliGRAM(s) Oral at bedtime PRN Insomnia  ondansetron Injectable 4 milliGRAM(s) IV Push every 8 hours PRN Nausea and/or Vomiting  polyethylene glycol 3350 17 Gram(s) Oral daily PRN Constipation

## 2023-06-21 NOTE — DISCHARGE NOTE NURSING/CASE MANAGEMENT/SOCIAL WORK - PATIENT PORTAL LINK FT
You can access the FollowMyHealth Patient Portal offered by [facility] by registering at the following website: [email or web address]. By joining [facility]’s FollowMyHealth portal, you will also be able to view your health information using other applications (apps) compatible with our system.

## 2023-06-21 NOTE — DISCHARGE NOTE NURSING/CASE MANAGEMENT/SOCIAL WORK - NSDCPEFALRISK_GEN_ALL_CORE
For information on Fall & Injury Prevention, visit: [email or web address] OR  [email or web address] OR  https://www.cdc.gov/steadi/patient.html

## 2023-06-21 NOTE — PHYSICAL THERAPY INITIAL EVALUATION ADULT - PATIENT PROFILE REVIEW, REHAB EVAL
H/H= 9.5/30.8 this morning increased from 5.8/20 on admission yesterday ,received 2 units PRBCs yesterday/yes

## 2023-06-21 NOTE — DIETITIAN INITIAL EVALUATION ADULT - OTHER INFO
89 y/o F with PMH HTN, HLD, AS s/p TAVR, MR, PPM, Hypothyroidism, GERD, OA/RA, intracranial hemorrhage s/p Ysabel hole presented with abnormal outpt labs. Pt was a poor historian and unable to say most of the history, she is A+Ox2 with some confusion. I spoke to her daughter, Dominga Sawyer, on the phone who stated that the pt had bloodwork yesterday morning and her Hb was found to be 6.2. No recent blood in the stool, dark stools, or bleeding. Pt has had nose bleeds but the pt's daughter attributes this to her picking her nose. She did have an episode in the last 2 weeks of near syncope. She has been more confused lately (putting her shirt on incorrectly or not remembering what she has to do in the bathroom). Was never diagnosed with dementia but was previously prescribed Aricept for memory issues. Denies fevers, chills, chest pain, SOB, abdominal pain, N/V, diarrhea/constipation.  89 y/o F with PMH HTN, HLD, AS s/p TAVR, MR, PPM, Hypothyroidism, GERD, OA/RA, intracranial hemorrhage s/p Ysabel hole presented with abnormal outpt labs. Pt was a poor historian and unable to say most of the history, she is A+Ox2 with some confusion. I spoke to her daughter, Dominga Sawyer, on the phone who stated that the pt had bloodwork yesterday morning and her Hb was found to be 6.2. No recent blood in the stool, dark stools, or bleeding. Pt has had nose bleeds but the pt's daughter attributes this to her picking her nose. She did have an episode in the last 2 weeks of near syncope. She has been more confused lately (putting her shirt on incorrectly or not remembering what she has to do in the bathroom). Was never diagnosed with dementia but was previously prescribed Aricept for memory issues. Denies fevers, chills, chest pain, SOB, abdominal pain, N/V, diarrhea/constipation.    Admit dx Anemia  Unable to get RD bedscale weight  EMR weight is 66 kg   146#        89 y/o F with PMH HTN, HLD, AS s/p TAVR, MR, PPM, Hypothyroidism, GERD, OA/RA, intracranial hemorrhage s/p [place] hole presented with abnormal outpt labs. Pt was a poor historian and unable to say most of the history, she is A+Ox2 with some confusion. I spoke to her daughter, Dominga Sawyer, on the phone who stated that the pt had bloodwork yesterday morning and her Hb was found to be 6.2. No recent blood in the stool, dark stools, or bleeding. Pt has had nose bleeds but the pt's daughter attributes this to her picking her nose. She did have an episode in the last 2 weeks of near syncope. She has been more confused lately (putting her shirt on incorrectly or not remembering what she has to do in the bathroom). Was never diagnosed with dementia but was previously prescribed Aricept for memory issues. Denies fevers, chills, chest pain, SOB, abdominal pain, N/V, diarrhea/constipation.    Admit dx Anemia  RD bedscale wt is 65 kg  143 #  PO intake at  is fair  NFPE reveals muscle wasting, fat wasting   PO intake estimated < 75% ENN > one month   Suggest Confirm Goals of Care regarding nutrition support  Recommendations to follow in Plan/Intervention

## 2023-06-21 NOTE — DIETITIAN INITIAL EVALUATION ADULT - MALNUTRITION
Pt meets criteria for moderate  protein-calorie malnutrition in context of acute on  chronic disease

## 2023-06-21 NOTE — PHYSICAL THERAPY INITIAL EVALUATION ADULT - PERTINENT HX OF CURRENT PROBLEM, REHAB EVAL
89 y/o F with PMH HTN, HLD, AS s/p TAVR, MR, PPM, Hypothyroidism, GERD, OA/RA, intracranial hemorrhage s/p Ysabel hole presented with abnormal outpt labs. Pt was a poor historian and unable to say most of the history, she is A+Ox2 with some confusion. I spoke to her daughter, Dominga Sawyer, on the phone who stated that the pt had blood work yesterday morning and her Hb was found to be 6.2. No recent blood in the stool, dark stools, or bleeding. Pt has had nose bleeds but the pt's daughter attributes this to her picking her nose. She did have an episode in the last 2 weeks of near syncope. She has been more confused lately (putting her shirt on incorrectly or not remembering what she has to do in the bathroom). Was never diagnosed with dementia but was previously prescribed Aricept for memory issue

## 2023-06-22 LAB
ANION GAP SERPL CALC-SCNC: 6 MMOL/L — SIGNIFICANT CHANGE UP (ref 5–17)
BUN SERPL-MCNC: 26 MG/DL — HIGH (ref 7–23)
CALCIUM SERPL-MCNC: 8.8 MG/DL — SIGNIFICANT CHANGE UP (ref 8.5–10.1)
CHLORIDE SERPL-SCNC: 109 MMOL/L — HIGH (ref 96–108)
CO2 SERPL-SCNC: 22 MMOL/L — SIGNIFICANT CHANGE UP (ref 22–31)
CREAT SERPL-MCNC: 1.03 MG/DL — SIGNIFICANT CHANGE UP (ref 0.5–1.3)
EGFR: 51 ML/MIN/1.73M2 — LOW
GLUCOSE SERPL-MCNC: 87 MG/DL — SIGNIFICANT CHANGE UP (ref 70–99)
HCT VFR BLD CALC: 27.8 % — LOW (ref 34.5–45)
HGB BLD-MCNC: 8.6 G/DL — LOW (ref 11.5–15.5)
MCHC RBC-ENTMCNC: 24.3 PG — LOW (ref 27–34)
MCHC RBC-ENTMCNC: 30.9 GM/DL — LOW (ref 32–36)
MCV RBC AUTO: 78.5 FL — LOW (ref 80–100)
PLATELET # BLD AUTO: 205 K/UL — SIGNIFICANT CHANGE UP (ref 150–400)
POTASSIUM SERPL-MCNC: 4.5 MMOL/L — SIGNIFICANT CHANGE UP (ref 3.5–5.3)
POTASSIUM SERPL-SCNC: 4.5 MMOL/L — SIGNIFICANT CHANGE UP (ref 3.5–5.3)
RBC # BLD: 3.54 M/UL — LOW (ref 3.8–5.2)
RBC # FLD: 18.6 % — HIGH (ref 10.3–14.5)
SODIUM SERPL-SCNC: 137 MMOL/L — SIGNIFICANT CHANGE UP (ref 135–145)
WBC # BLD: 6.14 K/UL — SIGNIFICANT CHANGE UP (ref 3.8–10.5)
WBC # FLD AUTO: 6.14 K/UL — SIGNIFICANT CHANGE UP (ref 3.8–10.5)

## 2023-06-22 PROCEDURE — 99232 SBSQ HOSP IP/OBS MODERATE 35: CPT

## 2023-06-22 RX ADMIN — Medication 1 DROP(S): at 05:29

## 2023-06-22 RX ADMIN — PANTOPRAZOLE SODIUM 40 MILLIGRAM(S): 20 TABLET, DELAYED RELEASE ORAL at 05:30

## 2023-06-22 RX ADMIN — Medication 1 DROP(S): at 14:45

## 2023-06-22 RX ADMIN — Medication 112 MICROGRAM(S): at 05:30

## 2023-06-22 RX ADMIN — Medication 1 DROP(S): at 23:06

## 2023-06-22 RX ADMIN — Medication 1 DROP(S): at 17:45

## 2023-06-22 RX ADMIN — Medication 325 MILLIGRAM(S): at 09:41

## 2023-06-22 NOTE — PROGRESS NOTE ADULT - SUBJECTIVE AND OBJECTIVE BOX
Patient is a 89y old  Female who presents with a chief complaint of Anemia     (21 Jun 2023 08:21)      SUBJECTIVE:   HPI:  89 y/o F with PMH HTN, HLD, AS s/p TAVR, MR, PPM, Hypothyroidism, GERD, OA/RA, intracranial hemorrhage s/p Ysabel hole presented with abnormal outpt labs. Pt was a poor historian and unable to say most of the history, she is A+Ox2 with some confusion. I spoke to her daughter, Dominga Sawyer, on the phone who stated that the pt had bloodwork yesterday morning and her Hb was found to be 6.2. No recent blood in the stool, dark stools, or bleeding. Pt has had nose bleeds but the pt's daughter attributes this to her picking her nose. She did have an episode in the last 2 weeks of near syncope. She has been more confused lately (putting her shirt on incorrectly or not remembering what she has to do in the bathroom). Was never diagnosed with dementia but was previously prescribed Aricept for memory issues. Denies fevers, chills, chest pain, SOB, abdominal pain, N/V, diarrhea/constipation.     Prior admission:  - 4/25/22: syncope/near syncope, thrombocytopenia -> pt wished to go home   - 10/7/22: severe microcytic anemia, unable to r/o malginancy, family refused endoscopic evaluation a tthe time    ER course: //66. Labs: Hb 5.8 (baseline ~8), MCV 74.3, albumin 3.1.   EKG: Sinus rhythm with sinus arrhythmia HR 60 bpm, DC prolonged 214 ms, 1st degree AV block, no ST segment changes, T wave inversions in lead III (personally reviewed).   Pt was given 2 units of PRBCs. She is being admitted to med/surg for further management.  (20 Jun 2023 23:05)      6/21: sitting up in bed, feeling better today. no confusion. no [id number]. no discomfort.       REVIEW OF SYSTEMS:    CONSTITUTIONAL: No weakness, fevers or chills  EYES/ENT: No visual changes;  No vertigo or throat pain   NECK: No pain or stiffness  RESPIRATORY: No cough, wheezing, hemoptysis; No shortness of breath  CARDIOVASCULAR: No chest pain or palpitations  GASTROINTESTINAL: No abdominal or epigastric pain. No nausea, vomiting, or hematemesis; No diarrhea or constipation. No melena or hematochezia.  GENITOURINARY: No dysuria, frequency or hematuria  NEUROLOGICAL: No numbness or weakness  SKIN: No itching, burning, rashes, or lesions   All other review of systems is negative unless indicated above      Weight (kg): 66.1 (06-20 @ 19:30)    Vital Signs Last 24 Hrs  T(C): 36.6 (21 Jun 2023 08:42), Max: 37.1 (20 Jun 2023 23:12)  T(F): 97.9 (21 Jun 2023 08:42), Max: 98.7 (20 Jun 2023 23:12)  HR: 61 (21 Jun 2023 10:44) (61 - 77)  BP: 145/54 (21 Jun 2023 10:44) (138/97 - 188/71)  BP(mean): 88 (21 Jun 2023 01:59) (87 - 101)  RR: 18 (21 Jun 2023 08:42) (17 - 18)  SpO2: 98% (21 Jun 2023 08:42) (97% - 100%)    Parameters below as of 21 Jun 2023 08:42  Patient On (Oxygen Delivery Method): room air          PHYSICAL EXAM:    Constitutional: NAD, awake and alert, elderly appearing.   HEENT: PERR, EOMI, hard of hearing. MMM  Neck: Soft and supple, No LAD, No JVD  Respiratory: Breath sounds are clear bilaterally, No wheezing, rales or rhonchi  Cardiovascular: S1 and S2, regular rate and rhythm, no Murmurs, gallops or rubs  Gastrointestinal: Bowel Sounds present, soft, nontender, nondistended, no guarding, no rebound  Extremities: No peripheral edema  Vascular: 2+ peripheral pulses  Neurological: A/O x 3, no focal deficits  Musculoskeletal: 5/5 strength b/l upper and lower extremities  Skin: No rashes      MEDICATIONS:  MEDICATIONS  (STANDING):  amLODIPine   Tablet 5 milliGRAM(s) Oral daily  artificial  tears Solution 1 Drop(s) Both EYES four times a day  cyanocobalamin 1000 MICROGram(s) Oral daily  ergocalciferol 28056 Unit(s) Oral <User Schedule>  ferrous    sulfate 325 milliGRAM(s) Oral daily  levothyroxine 112 MICROGram(s) Oral daily  metoprolol succinate ER 25 milliGRAM(s) Oral daily  pantoprazole    Tablet 40 milliGRAM(s) Oral before breakfast      LABS: All Labs Reviewed:                          9.5    7.13  )-----------( 229      ( 21 Jun 2023 10:32 )             30.8     06-21    136  |  108  |  27<H>  ----------------------------<  84  4.6   |  23  |  1.10    Ca    9.1      21 Jun 2023 10:32    TPro  7.3  /  Alb  3.1<L>  /  TBili  0.3  /  DBili  x   /  AST  13<L>  /  ALT  13  /  AlkPhos  111  06-20    PT/INR - ( 20 Jun 2023 12:42 )   PT: 11.8 sec;   INR: 1.02 ratio PTT - ( 20 Jun 2023 12:42 )  PTT:29.1 sec    Thyroid Stimulating Hormone, Serum: 1.59 uU/mL (06.21.23 @ 10:32)      < from: TTE Echo Complete w/o Contrast w/ Doppler (06.21.23 @ 08:36) >   Summary     Severemitral annular calcification is present.   There is thickening of mitral valve.   The leaflet opening is restricted.   Mean transmitral gradient is 15 mmHg; this finding is consistent with   severe mitral stenosis.   Chordal JOSHUA is suggested.   Moderate (2+) mitral regurgitation is present.   Well seated prosthetic valve in the aortic position.   Mean trans-prosthetic gradient is within normal limitations for this type   of prosthesis.   The tricuspid valve leaflets appear mildly thickened.   Mild (1+) tricuspid valve regurgitation is present.   Mild pulmonary hypertension.   Pulmonic valve not well seen.Probably normal.   Mild pulmonic valvular regurgitation (1+) is present.   The left atrium is mildly dilated.   Mild concentric left ventricular hypertrophy is present.   Estimated left ventricular ejection fraction is> 60 %.   Normal appearing right ventricle structure and function.   A device wire is seen in the RV and RA.      < end of copied text >      
Patient is a 89y old  Female who presents with a chief complaint of Anemia     (21 Jun 2023 08:21)      SUBJECTIVE:   HPI:  89 y/o F with PMH HTN, HLD, AS s/p TAVR, MR, PPM, Hypothyroidism, GERD, OA/RA, intracranial hemorrhage s/p Ysabel hole presented with abnormal outpt labs. Pt was a poor historian and unable to say most of the history, she is A+Ox2 with some confusion. I spoke to her daughter, Dominga Sawyer, on the phone who stated that the pt had bloodwork yesterday morning and her Hb was found to be 6.2. No recent blood in the stool, dark stools, or bleeding. Pt has had nose bleeds but the pt's daughter attributes this to her picking her nose. She did have an episode in the last 2 weeks of near syncope. She has been more confused lately (putting her shirt on incorrectly or not remembering what she has to do in the bathroom). Was never diagnosed with dementia but was previously prescribed Aricept for memory issues. Denies fevers, chills, chest pain, SOB, abdominal pain, N/V, diarrhea/constipation.     Prior admission:  - 4/25/22: syncope/near syncope, thrombocytopenia -> pt wished to go home   - 10/7/22: severe microcytic anemia, unable to r/o malginancy, family refused endoscopic evaluation a tthe time    ER course: //66. Labs: Hb 5.8 (baseline ~8), MCV 74.3, albumin 3.1.   EKG: Sinus rhythm with sinus arrhythmia HR 60 bpm, KS prolonged 214 ms, 1st degree AV block, no ST segment changes, T wave inversions in lead III (personally reviewed).   Pt was given 2 units of PRBCs. She is being admitted to med/surg for further management.  (20 Jun 2023 23:05)      6/21: sitting up in bed, feeling better today. no confusion. no [id number]. no discomfort.   6/22: seen and examined sitting up in chair. no discomfort. explained results of labs to pt and daugther.       REVIEW OF SYSTEMS:    CONSTITUTIONAL: No weakness, fevers or chills  EYES/ENT: No visual changes;  No vertigo or throat pain   NECK: No pain or stiffness  RESPIRATORY: No cough, wheezing, hemoptysis; No shortness of breath  CARDIOVASCULAR: No chest pain or palpitations  GASTROINTESTINAL: No abdominal or epigastric pain. No nausea, vomiting, or hematemesis; No diarrhea or constipation. No melena or hematochezia.  GENITOURINARY: No dysuria, frequency or hematuria  NEUROLOGICAL: No numbness or weakness  SKIN: No itching, burning, rashes, or lesions   All other review of systems is negative unless indicated above      Weight (kg): 66.1 (06-20 @ 19:30)    Vital Signs Last 24 Hrs  T(C): 36.7 (22 Jun 2023 07:46), Max: 36.8 (21 Jun 2023 23:01)  T(F): 98.1 (22 Jun 2023 07:46), Max: 98.2 (21 Jun 2023 23:01)  HR: 56 (22 Jun 2023 09:43) (53 - 60)  BP: 101/47 (22 Jun 2023 09:43) (101/47 - 142/86)  BP(mean): --  RR: 18 (22 Jun 2023 07:46) (18 - 18)  SpO2: 97% (22 Jun 2023 07:46) (96% - 100%)    Parameters below as of 22 Jun 2023 07:46  Patient On (Oxygen Delivery Method): room air            PHYSICAL EXAM:    Constitutional: NAD, awake and alert, elderly appearing.   HEENT: PERR, EOMI, hard of hearing. MMM  Neck: Soft and supple, No LAD, No JVD  Respiratory: Breath sounds are clear bilaterally, No wheezing, rales or rhonchi  Cardiovascular: S1 and S2, regular rate and rhythm, no Murmurs, gallops or rubs  Gastrointestinal: Bowel Sounds present, soft, nontender, nondistended, no guarding, no rebound  Extremities: No peripheral edema  Vascular: 2+ peripheral pulses  Neurological: A/O x 3, no focal deficits  Musculoskeletal: 5/5 strength b/l upper and lower extremities  Skin: No rashes      MEDICATIONS:  MEDICATIONS  (STANDING):  amLODIPine   Tablet 5 milliGRAM(s) Oral daily  artificial  tears Solution 1 Drop(s) Both EYES four times a day  cyanocobalamin 1000 MICROGram(s) Oral daily  ergocalciferol 48181 Unit(s) Oral <User Schedule>  ferrous    sulfate 325 milliGRAM(s) Oral daily  levothyroxine 112 MICROGram(s) Oral daily  metoprolol succinate ER 25 milliGRAM(s) Oral daily  pantoprazole    Tablet 40 milliGRAM(s) Oral before breakfast      LABS: All Labs Reviewed:                          8.6    6.14  )-----------( 205      ( 22 Jun 2023 07:14 )             27.8     06-22    137  |  109<H>  |  26<H>  ----------------------------<  87  4.5   |  22  |  1.03    Ca    8.8      22 Jun 2023 07:14      Urinalysis Basic - ( 22 Jun 2023 07:14 )    Color: x / Appearance: x / SG: x / pH: x  Gluc: 87 mg/dL / Ketone: x  / Bili: x / Urobili: x   Blood: x / Protein: x / Nitrite: x   Leuk Esterase: x / RBC: x / WBC x   Sq Epi: x / Non Sq Epi: x / Bacteria: x             < from: TTE Echo Complete w/o Contrast w/ Doppler (06.21.23 @ 08:36) >   Summary     Severemitral annular calcification is present.   There is thickening of mitral valve.   The leaflet opening is restricted.   Mean transmitral gradient is 15 mmHg; this finding is consistent with   severe mitral stenosis.   Chordal JOSHUA is suggested.   Moderate (2+) mitral regurgitation is present.   Well seated prosthetic valve in the aortic position.   Mean trans-prosthetic gradient is within normal limitations for this type   of prosthesis.   The tricuspid valve leaflets appear mildly thickened.   Mild (1+) tricuspid valve regurgitation is present.   Mild pulmonary hypertension.   Pulmonic valve not well seen.Probably normal.   Mild pulmonic valvular regurgitation (1+) is present.   The left atrium is mildly dilated.   Mild concentric left ventricular hypertrophy is present.   Estimated left ventricular ejection fraction is> 60 %.   Normal appearing right ventricle structure and function.   A device wire is seen in the RV and RA.      < end of copied text >

## 2023-06-22 NOTE — DIETITIAN NUTRITION RISK NOTIFICATION - ADDITIONAL COMMENTS/DIETITIAN RECOMMENDATIONS
Maintain Current DASH diet  Record PO intake in EMR after each meal (nursing.)  MVI w/ minerals daily to ensure 100% RDA met   Ensure plus bid  Monitor bowel movements, if no BM for >3 days, consider implementing bowel regimen.  Confirm Goals of Care regarding nutrition support   Monitor PO intake, tolerance, labs and weight.

## 2023-06-22 NOTE — PROVIDER CONTACT NOTE (OTHER) - SITUATION
notified DNP  Angie's office of admission please fax over discharge paperwork once patient is discharged to [phone]

## 2023-06-22 NOTE — PROGRESS NOTE ADULT - ASSESSMENT
· Assessment	  89 y/o F presented with low Hb     1. Severe microcytic anemia   - Hb 5.8 (baseline ~11 per pt ), MCV 74.3, s/p 2 units of PRBCs ---- now at 8/27  - occult blood negative. monitor for bleeding.   - Transfuse for Hb < 7   - elevated tibc and low iron saturation. will start on iron supplements. family continues to decline scope   - conservative treatment for now. no plans for endo/ colo at this time.      2. Uncontrolled HTN   - //66, monitor   - Hydralazine for SBP > 160   - Start amlodipine 5 mg daily and titrate up as tolerated     3. Acute metabolic encephalopathy likely secondary to anemia vs. metabolic   - clinically improving.   - Pt's daughter states the pt has become confused previously when she is anemic, she would like to see how she does have receiving blood before acquiring any imaging studies   - TSH WNL, pending b12 and folate levels     4. Near syncope likely secondary to anemia   - Monitor on remote telemetry   - Orthostatic vital signs  - ECHO - severe mitral calcification, severe mitral stenosis, well seated aortic valve, mild concentric LV hypertrophy, EF ~ 60% , nomal RV ,   - EKG: NSR with sinus arrhythmia HR 60 bpm, NM prolonged 214 ms, 1st degree AV block  - s/p PPM interrogation.     5. Hypoalbuminemia   - Albumin 3.1  - Nutrition consult     6. History of HTN, HLD, AS s/p TAVR, MR, PPM, Hypothyroidism, GERD, OA/RA, intracranial hemorrhage s/p [place] hole   - c/w home medications; verified with pt at the bedside     DVT ppx: SCDs   Code status: DNR/DNI   Emergency contact: Dominga Sawyer (daughter) [phone] - left message with callback number.

## 2023-06-22 NOTE — PROGRESS NOTE ADULT - NUTRITIONAL ASSESSMENT
This patient has been assessed with a concern for Malnutrition and has been determined to have a diagnosis/diagnoses of Moderate protein-calorie malnutrition.    This patient is being managed with:   Diet DASH/TLC-  Sodium & Cholesterol Restricted  Entered: Jun 20 2023 11:00PM

## 2023-06-23 ENCOUNTER — TRANSCRIPTION ENCOUNTER (OUTPATIENT)
Age: 88
End: 2023-06-23

## 2023-06-23 VITALS
DIASTOLIC BLOOD PRESSURE: 65 MMHG | TEMPERATURE: 97 F | HEART RATE: 75 BPM | SYSTOLIC BLOOD PRESSURE: 133 MMHG | OXYGEN SATURATION: 95 % | RESPIRATION RATE: 18 BRPM

## 2023-06-23 LAB
ANION GAP SERPL CALC-SCNC: 5 MMOL/L — SIGNIFICANT CHANGE UP (ref 5–17)
BUN SERPL-MCNC: 34 MG/DL — HIGH (ref 7–23)
CALCIUM SERPL-MCNC: 9.2 MG/DL — SIGNIFICANT CHANGE UP (ref 8.5–10.1)
CHLORIDE SERPL-SCNC: 108 MMOL/L — SIGNIFICANT CHANGE UP (ref 96–108)
CO2 SERPL-SCNC: 24 MMOL/L — SIGNIFICANT CHANGE UP (ref 22–31)
CREAT SERPL-MCNC: 1.22 MG/DL — SIGNIFICANT CHANGE UP (ref 0.5–1.3)
EGFR: 42 ML/MIN/1.73M2 — LOW
GLUCOSE SERPL-MCNC: 94 MG/DL — SIGNIFICANT CHANGE UP (ref 70–99)
HCT VFR BLD CALC: 32.4 % — LOW (ref 34.5–45)
HGB BLD-MCNC: 9.8 G/DL — LOW (ref 11.5–15.5)
MCHC RBC-ENTMCNC: 23.8 PG — LOW (ref 27–34)
MCHC RBC-ENTMCNC: 30.2 GM/DL — LOW (ref 32–36)
MCV RBC AUTO: 78.6 FL — LOW (ref 80–100)
PLATELET # BLD AUTO: 231 K/UL — SIGNIFICANT CHANGE UP (ref 150–400)
POTASSIUM SERPL-MCNC: 4.7 MMOL/L — SIGNIFICANT CHANGE UP (ref 3.5–5.3)
POTASSIUM SERPL-SCNC: 4.7 MMOL/L — SIGNIFICANT CHANGE UP (ref 3.5–5.3)
RBC # BLD: 4.12 M/UL — SIGNIFICANT CHANGE UP (ref 3.8–5.2)
RBC # FLD: 19.9 % — HIGH (ref 10.3–14.5)
SODIUM SERPL-SCNC: 137 MMOL/L — SIGNIFICANT CHANGE UP (ref 135–145)
WBC # BLD: 9.06 K/UL — SIGNIFICANT CHANGE UP (ref 3.8–10.5)
WBC # FLD AUTO: 9.06 K/UL — SIGNIFICANT CHANGE UP (ref 3.8–10.5)

## 2023-06-23 PROCEDURE — 99239 HOSP IP/OBS DSCHRG MGMT >30: CPT

## 2023-06-23 RX ADMIN — PANTOPRAZOLE SODIUM 40 MILLIGRAM(S): 20 TABLET, DELAYED RELEASE ORAL at 05:30

## 2023-06-23 RX ADMIN — Medication 25 MILLIGRAM(S): at 10:19

## 2023-06-23 RX ADMIN — Medication 325 MILLIGRAM(S): at 10:17

## 2023-06-23 RX ADMIN — Medication 1 DROP(S): at 05:30

## 2023-06-23 RX ADMIN — ERGOCALCIFEROL 50000 UNIT(S): 1.25 CAPSULE ORAL at 10:17

## 2023-06-23 RX ADMIN — Medication 112 MICROGRAM(S): at 05:31

## 2023-06-23 NOTE — DISCHARGE NOTE PROVIDER - DETAILS OF MALNUTRITION DIAGNOSIS/DIAGNOSES
This patient has been assessed with a concern for Malnutrition and was treated during this hospitalization for the following Nutrition diagnosis/diagnoses:     -  06/22/2023: Moderate protein-calorie malnutrition

## 2023-06-23 NOTE — DISCHARGE NOTE PROVIDER - HOSPITAL COURSE
89 y/o F with PMH HTN, HLD, AS s/p TAVR, MR, PPM, Hypothyroidism, GERD, OA/RA, intracranial hemorrhage s/p Ysabel hole presented with abnormal outpt labs. Pt was a poor historian and unable to say most of the history, she is A+Ox2 with some confusion. I spoke to her daughter, Dominga Sawyer, on the phone who stated that the pt had bloodwork yesterday morning and her Hb was found to be 6.2. No recent blood in the stool, dark stools, or bleeding. Pt has had nose bleeds but the pt's daughter attributes this to her picking her nose. She did have an episode in the last 2 weeks of near syncope. She has been more confused lately (putting her shirt on incorrectly or not remembering what she has to do in the bathroom). Was never diagnosed with dementia but was previously prescribed Aricept for memory issues. Denies fevers, chills, chest pain, SOB, abdominal pain, N/V, diarrhea/constipation.     pt admitted for confusion, severe microcytic anemia 5.8 & 20 - was given 2 units of PRBC. no bleeding noted. family declined further diagnostic work up regarding bleeding. pt was supplemented with PO iron and completed 2 unit PRBC transfusion. H&H now at 9.8 & 32. pt also had episode of hypertension, was started ON po Amlodipine but developed hypotension, hypotension   pt confusion resolved. now is alert and oriented x 4 and no exhibiting sx of encephalopathy. pt will be dc home with daughter and follow up with PCP Dr. Adams. no other interventions or imaging during admission. pt can follow up with PCP per baseline      1. Severe microcytic anemia   - Hb 5.8 (baseline ~11 per pt ), MCV 74.3, s/p 2 units of PRBCs ---- now at 9& 32   - occult blood negative. monitor for bleeding.   - Transfuse for Hb < 7   - elevated tibc and low iron saturation. will start on iron supplements. family continues to decline scope   - conservative treatment for now. no plans for endo/ colo at this time.      2. Uncontrolled HTN   - //66, monitor   - Hydralazine for SBP > 160   - Start amlodipine 5 mg but was stopped due to hypotension     3. Acute metabolic encephalopathy likely secondary to anemia vs. metabolic  --- resolved   - clinically improving.   - Pt's daughter states the pt has become confused previously when she is anemic, she would like to see how she does have receiving blood before acquiring any imaging studies   - TSH WNL, b12 elevated. tsh wnl     4. Near syncope likely secondary to anemia   - remote telemetry without arrhthymias   - Orthostatic vital signs positive. since resolved   - ECHO - severe mitral calcification, severe mitral stenosis, well seated aortic valve, mild concentric LV hypertrophy, EF ~ 60% , nomal RV ,   - EKG: NSR with sinus arrhythmia HR 60 bpm, NJ prolonged 214 ms, 1st degree AV block  - s/p PPM interrogation.     5. moderate protein calorie malnutrition   - multivitamin, ensure supplement   - RD consult  - liberalized diet     6. History of HTN, HLD, AS s/p TAVR, MR, PPM, Hypothyroidism, GERD, OA/RA, intracranial hemorrhage s/p Ysabel hole   - c/w home medications; verified with pt at the bedside     DVT ppx: s/p SCDs   Code status: DNR/DNI   Emergency contact: Dominga Sawyer (daughter) [phone] - left message with callback number.        89 y/o F with PMH HTN, HLD, AS s/p TAVR, MR, PPM, Hypothyroidism, GERD, OA/RA, intracranial hemorrhage s/p Ysabel hole presented with abnormal outpt labs. Pt was a poor historian and unable to say most of the history, she is A+Ox2 with some confusion. I spoke to her daughter, Dominga Sawyer, on the phone who stated that the pt had bloodwork yesterday morning and her Hb was found to be 6.2. No recent blood in the stool, dark stools, or bleeding. Pt has had nose bleeds but the pt's daughter attributes this to her picking her nose. She did have an episode in the last 2 weeks of near syncope. She has been more confused lately (putting her shirt on incorrectly or not remembering what she has to do in the bathroom). Was never diagnosed with dementia but was previously prescribed Aricept for memory issues. Denies fevers, chills, chest pain, SOB, abdominal pain, N/V, diarrhea/constipation.     pt admitted for confusion, severe microcytic anemia 5.8 & 20 - was given 2 units of PRBC. no bleeding noted. family declined further diagnostic work up regarding bleeding. pt was supplemented with PO iron and completed 2 unit PRBC transfusion. H&H now at 9.8 & 32. pt also had episode of hypertension, was started ON po Amlodipine but developed hypotension, hypotension   pt confusion resolved. now is alert and oriented x 4 and no exhibiting sx of encephalopathy. pt will be dc home with daughter and follow up with PCP Dr. Adams. no other interventions or imaging during admission. pt can follow up with PCP per baseline      1. Severe microcytic anemia   - Hb 5.8 (baseline ~11 per pt ), MCV 74.3, s/p 2 units of PRBCs ---- now at 9& 32   - occult blood negative. monitor for bleeding.   - Transfuse for Hb < 7   - elevated tibc and low iron saturation. will start on iron supplements. family continues to decline scope   - conservative treatment for now. no plans for endo/ colo at this time.      2. Uncontrolled HTN   - //66, monitor   - Hydralazine for SBP > 160   - Start amlodipine 5 mg but was stopped due to hypotension     3. Acute metabolic encephalopathy likely secondary to anemia vs. metabolic  --- resolved   - clinically improving.   - Pt's daughter states the pt has become confused previously when she is anemic, she would like to see how she does have receiving blood before acquiring any imaging studies   - TSH WNL, b12 elevated. tsh wnl     4. Near syncope likely secondary to anemia   - remote telemetry without arrhthymias   - Orthostatic vital signs positive. since resolved   - ECHO - severe mitral calcification, severe mitral stenosis, well seated aortic valve, mild concentric LV hypertrophy, EF ~ 60% , nomal RV ,   - EKG: NSR with sinus arrhythmia HR 60 bpm, ND prolonged 214 ms, 1st degree AV block  - s/p PPM interrogation.     5. moderate protein calorie malnutrition   - multivitamin, ensure supplement   - RD consult  - liberalized diet     6. History of HTN, HLD, AS s/p TAVR, MR, PPM, Hypothyroidism, GERD, OA/RA, intracranial hemorrhage s/p Ysabel hole   - c/w home medications; verified with pt at the bedside     DVT ppx: s/p SCDs   Code status: DNR/DNI   Emergency contact: Dominga Sawyer (daughter) [phone] - left message with callback number.   Attending note: Patient seen and examined with CYNTHIA Carter  Case reviewed and discussed with her and necessary changes were made  Agree with her assessment and d/c plan.  Spent more than 30 min to prepare the discharge

## 2023-06-23 NOTE — DISCHARGE NOTE PROVIDER - NSDCCPCAREPLAN_GEN_ALL_CORE_FT
PRINCIPAL DISCHARGE DIAGNOSIS  Diagnosis: Symptomatic anemia  Assessment and Plan of Treatment: Follow-up with your outpatient provider for further monitoring of your blood counts. Monitor for signs/symptoms indicating worsening of disease, such as, easy bleeding/bruising, pale skin, fatigue, dizziness, increased heart rate, or chest pain.  Hemoglobin is a protein that helps carry oxygen throughout your body. Red blood cells use iron to create hemoglobin.  AFTER YOU LEAVE: Follow up with your healthcare provider as directed:  Follow-up testing will be need to find out the type of anemia you have and proper treatment. Write down your questions so you remember to ask them during your visits.  Medicines: Iron or folic acid supplements may be suggested by your healthcare provider. Take only what your healthcare provider prescribes. Too much of the supplements may damage your organs.  Take your medicine as directed. Contact your healthcare provider if you think your medicine is not helping or you have side effects. Tell him if you are allergic to any medicine. Keep a list of the medicines, vitamins, and herbs you take. Include the amounts, and when and why you take them. Bring the list or the pill bottles to follow-up visits. Carry your medicine list with you in case of an emergency.  Prevent anemia: Eat healthy foods rich in iron and vitamin C. Healthy foods include fruits, leafy-green vegetables, whole-grain breads, low-fat dairy products, beans, lean meats, and fish. Vitamin C and lean meats help your body absorb iron. Foods rich in vitamin C include oranges and other citrus fruits. Ask your healthcare provider or dietitian for a list of other foods that are high in iron or vitamin C. Ask if you need to be on a special diet.  Contact your healthcare provider if: Your symptoms are worse, even after treatment.  You have questions or concerns about your condition or care.   Seek care immediately or call 911 if: You have bloody bowel movements.  You have severe chest pain. You lose consciousness.

## 2023-06-23 NOTE — DISCHARGE NOTE PROVIDER - NSDCMRMEDTOKEN_GEN_ALL_CORE_FT
cyanocobalamin 1000 mcg oral tablet: 1 tab(s) orally once a day  Easy Iron 28 mg oral capsule: 1 cap(s) orally once a day  ergocalciferol 1.25 mg (50,000 intl units) oral capsule: 1 cap(s) orally once a week on Fridays  levothyroxine 112 mcg (0.112 mg) oral tablet: 1 tab(s) orally once a day  metoprolol succinate 25 mg oral tablet, extended release: 1 tab(s) orally once a day  MiraLax oral powder for reconstitution: 17 gram(s) orally once a day, As Needed  mix 17 gram in 8oz fluid and give once daily   Multiple Vitamins oral tablet: 1 tab(s) orally once a day  omeprazole 20 mg oral delayed release capsule: 1 cap(s) orally 2 times a day as needed for  indigestion  Refresh Digital preserved ophthalmic solution: 1 drop(s) to each affected eye 4 times a day, As Needed - for dry eyes

## 2023-06-23 NOTE — DISCHARGE NOTE PROVIDER - NSDCPNSUBOBJ_GEN_ALL_CORE
All 10 systems reviewed and found to be negative with the exception of what has been described above.    Vital Signs Last 24 Hrs  T(C): 36.3 (23 Jun 2023 07:23), Max: 36.6 (22 Jun 2023 16:16)  T(F): 97.3 (23 Jun 2023 07:23), Max: 97.9 (22 Jun 2023 23:22)  HR: 75 (23 Jun 2023 07:23) (64 - 75)  BP: 133/65 (23 Jun 2023 07:23) (133/65 - 154/77)  BP(mean): --  RR: 18 (23 Jun 2023 07:23) (18 - 18)  SpO2: 95% (23 Jun 2023 07:23) (95% - 97%) room air         PHYSICAL EXAM:    Constitutional: NAD, awake and alert, elderly appearing.   HEENT: PERR, EOMI, hard of hearing. MMM  Neck: Soft and supple, No LAD, No JVD  Respiratory: Breath sounds are clear bilaterally, No wheezing, rales or rhonchi  Cardiovascular: S1 and S2, regular rate and rhythm, no Murmurs, gallops or rubs  Gastrointestinal: Bowel Sounds present, soft, nontender, nondistended, no guarding, no rebound  Extremities: No peripheral edema  Vascular: 2+ peripheral pulses  Neurological: A/O x 3, no focal deficits  Musculoskeletal: 5/5 strength b/l upper and lower extremities  Skin: No rashes    LABS                         9.8    9.06  )-----------( 231      ( 23 Jun 2023 11:26 )             32.4     06-23    137  |  108  |  34<H>  ----------------------------<  94  4.7   |  24  |  1.22    Ca    9.2      23 Jun 2023 11:26      Urinalysis Basic - ( 23 Jun 2023 11:26 )    Color: x / Appearance: x / SG: x / pH: x  Gluc: 94 mg/dL / Ketone: x  / Bili: x / Urobili: x   Blood: x / Protein: x / Nitrite: x   Leuk Esterase: x / RBC: x / WBC x   Sq Epi: x / Non Sq Epi: x / Bacteria: x      < from: TTE Echo Complete w/o Contrast w/ Doppler (06.21.23 @ 08:36) >   Summary     Severemitral annular calcification is present.   There is thickening of mitral valve.   The leaflet opening is restricted.   Mean transmitral gradient is 15 mmHg; this finding is consistent with   severe mitral stenosis.   Chordal JOSHUA is suggested.   Moderate (2+) mitral regurgitation is present.   Well seated prosthetic valve in the aortic position.   Mean trans-prosthetic gradient is within normal limitations for this type   of prosthesis.   The tricuspid valve leaflets appear mildly thickened.   Mild (1+) tricuspid valve regurgitation is present.   Mild pulmonary hypertension.   Pulmonic valve not well seen.Probably normal.   Mild pulmonic valvular regurgitation (1+) is present.   The left atrium is mildly dilated.   Mild concentric left ventricular hypertrophy is present.   Estimated left ventricular ejection fraction is> 60 %.   Normal appearing right ventricle structure and function.   A device wire is seen in the RV and RA.      < end of copied text >

## 2023-06-27 DIAGNOSIS — Z79.899 OTHER LONG TERM (CURRENT) DRUG THERAPY: ICD-10-CM

## 2023-06-27 DIAGNOSIS — Z95.0 PRESENCE OF CARDIAC PACEMAKER: ICD-10-CM

## 2023-06-27 DIAGNOSIS — Z66 DO NOT RESUSCITATE: ICD-10-CM

## 2023-06-27 DIAGNOSIS — Z79.890 HORMONE REPLACEMENT THERAPY: ICD-10-CM

## 2023-06-27 DIAGNOSIS — E03.9 HYPOTHYROIDISM, UNSPECIFIED: ICD-10-CM

## 2023-06-27 DIAGNOSIS — M19.90 UNSPECIFIED OSTEOARTHRITIS, UNSPECIFIED SITE: ICD-10-CM

## 2023-06-27 DIAGNOSIS — Z90.49 ACQUIRED ABSENCE OF OTHER SPECIFIED PARTS OF DIGESTIVE TRACT: ICD-10-CM

## 2023-06-27 DIAGNOSIS — G93.41 METABOLIC ENCEPHALOPATHY: ICD-10-CM

## 2023-06-27 DIAGNOSIS — K21.9 GASTRO-ESOPHAGEAL REFLUX DISEASE WITHOUT ESOPHAGITIS: ICD-10-CM

## 2023-06-27 DIAGNOSIS — Z95.2 PRESENCE OF PROSTHETIC HEART VALVE: ICD-10-CM

## 2023-06-27 DIAGNOSIS — D50.9 IRON DEFICIENCY ANEMIA, UNSPECIFIED: ICD-10-CM

## 2023-06-27 DIAGNOSIS — E78.5 HYPERLIPIDEMIA, UNSPECIFIED: ICD-10-CM

## 2023-06-27 DIAGNOSIS — I10 ESSENTIAL (PRIMARY) HYPERTENSION: ICD-10-CM

## 2023-06-27 DIAGNOSIS — M06.9 RHEUMATOID ARTHRITIS, UNSPECIFIED: ICD-10-CM

## 2023-09-18 ENCOUNTER — APPOINTMENT (OUTPATIENT)
Dept: ELECTROPHYSIOLOGY | Facility: CLINIC | Age: 88
End: 2023-09-18
Payer: MEDICARE

## 2023-09-19 ENCOUNTER — NON-APPOINTMENT (OUTPATIENT)
Age: 88
End: 2023-09-19

## 2023-09-19 PROCEDURE — 93294 REM INTERROG EVL PM/LDLS PM: CPT

## 2023-09-19 PROCEDURE — 93296 REM INTERROG EVL PM/IDS: CPT

## 2023-10-13 NOTE — DIETITIAN INITIAL EVALUATION ADULT - PHYSICAL ASSESSMENT FAT OVERLYING RIBCAGE
moderate Interpolation Flap Text: A decision was made to reconstruct the defect utilizing an interpolation axial flap and a staged reconstruction.  A telfa template was made of the defect.  This telfa template was then used to outline the interpolation flap.  The donor area for the pedicle flap was then injected with anesthesia.  The flap was excised through the skin and subcutaneous tissue down to the layer of the underlying musculature.  The interpolation flap was carefully excised within this deep plane to maintain its blood supply.  The edges of the donor site were undermined.   The donor site was closed in a primary fashion.  The pedicle was then rotated into position and sutured.  Once the tube was sutured into place, adequate blood supply was confirmed with blanching and refill.  The pedicle was then wrapped with xeroform gauze and dressed appropriately with a telfa and gauze bandage to ensure continued blood supply and protect the attached pedicle.
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
## 2023-12-13 NOTE — DISCHARGE NOTE PROVIDER - NSDCHHNEEDSERVICE_GEN_ALL_CORE
2057 20g IV placed; labs drawn and sent  2106 pt to labor room 6   Medication teaching and assessment/Observation and assessment/Rehabilitation services/Teaching and training

## 2023-12-18 ENCOUNTER — APPOINTMENT (OUTPATIENT)
Dept: ELECTROPHYSIOLOGY | Facility: CLINIC | Age: 88
End: 2023-12-18
Payer: MEDICARE

## 2023-12-19 ENCOUNTER — NON-APPOINTMENT (OUTPATIENT)
Age: 88
End: 2023-12-19

## 2023-12-19 PROCEDURE — 93296 REM INTERROG EVL PM/IDS: CPT

## 2023-12-19 PROCEDURE — 93294 REM INTERROG EVL PM/LDLS PM: CPT

## 2024-01-27 NOTE — DISCHARGE NOTE PROVIDER - NSDCFUSCHEDAPPT_GEN_ALL_CORE_FT
This patient is refusing all scheduled medication and personal care from staff. This patient  was able to tolerate her pain and anxiety medication  that she requested to only have at this time. Staff nurse was able to record this patient blood pressure, which she  refused earlier by another staff member.  This patient is exhibiting mood swings . Strange, mild but aggressive behavior. Staff will continue to monitor this patient.   [facility] Physician [facility]  [employer] [address]  Scheduled Appointment: 06/09/2022

## 2024-03-18 ENCOUNTER — NON-APPOINTMENT (OUTPATIENT)
Age: 89
End: 2024-03-18

## 2024-03-19 ENCOUNTER — APPOINTMENT (OUTPATIENT)
Dept: ELECTROPHYSIOLOGY | Facility: CLINIC | Age: 89
End: 2024-03-19
Payer: MEDICARE

## 2024-03-19 PROCEDURE — 93294 REM INTERROG EVL PM/LDLS PM: CPT

## 2024-03-19 PROCEDURE — 93296 REM INTERROG EVL PM/IDS: CPT

## 2024-06-17 ENCOUNTER — NON-APPOINTMENT (OUTPATIENT)
Age: 89
End: 2024-06-17

## 2024-06-18 ENCOUNTER — APPOINTMENT (OUTPATIENT)
Dept: ELECTROPHYSIOLOGY | Facility: CLINIC | Age: 89
End: 2024-06-18
Payer: MEDICARE

## 2024-06-18 PROCEDURE — 93294 REM INTERROG EVL PM/LDLS PM: CPT

## 2024-06-18 PROCEDURE — 93296 REM INTERROG EVL PM/IDS: CPT

## 2024-09-16 ENCOUNTER — NON-APPOINTMENT (OUTPATIENT)
Age: 89
End: 2024-09-16

## 2024-09-16 ENCOUNTER — APPOINTMENT (OUTPATIENT)
Dept: ELECTROPHYSIOLOGY | Facility: CLINIC | Age: 89
End: 2024-09-16
Payer: MEDICARE

## 2024-09-17 PROCEDURE — 93296 REM INTERROG EVL PM/IDS: CPT

## 2024-09-17 PROCEDURE — 93294 REM INTERROG EVL PM/LDLS PM: CPT

## 2024-10-10 ENCOUNTER — INPATIENT (INPATIENT)
Facility: HOSPITAL | Age: 88
LOS: 4 days | Discharge: HOME | DRG: 66 | End: 2024-10-15
Attending: FAMILY MEDICINE | Admitting: STUDENT IN AN ORGANIZED HEALTH CARE EDUCATION/TRAINING PROGRAM
Payer: MEDICARE

## 2024-10-10 VITALS
TEMPERATURE: 99 F | WEIGHT: 139.99 LBS | RESPIRATION RATE: 18 BRPM | HEART RATE: 84 BPM | DIASTOLIC BLOOD PRESSURE: 84 MMHG | HEIGHT: 66 IN | SYSTOLIC BLOOD PRESSURE: 146 MMHG | OXYGEN SATURATION: 96 %

## 2024-10-10 DIAGNOSIS — Z90.49 ACQUIRED ABSENCE OF OTHER SPECIFIED PARTS OF DIGESTIVE TRACT: Chronic | ICD-10-CM

## 2024-10-10 DIAGNOSIS — Z98.890 OTHER SPECIFIED POSTPROCEDURAL STATES: Chronic | ICD-10-CM

## 2024-10-10 DIAGNOSIS — Z95.0 PRESENCE OF CARDIAC PACEMAKER: Chronic | ICD-10-CM

## 2024-10-10 DIAGNOSIS — Z90.89 ACQUIRED ABSENCE OF OTHER ORGANS: Chronic | ICD-10-CM

## 2024-10-10 DIAGNOSIS — Z95.2 PRESENCE OF PROSTHETIC HEART VALVE: Chronic | ICD-10-CM

## 2024-10-10 DIAGNOSIS — I63.9 CEREBRAL INFARCTION, UNSPECIFIED: ICD-10-CM

## 2024-10-10 DIAGNOSIS — H26.9 UNSPECIFIED CATARACT: Chronic | ICD-10-CM

## 2024-10-10 LAB
ALBUMIN SERPL ELPH-MCNC: 3.6 G/DL — SIGNIFICANT CHANGE UP (ref 3.3–5)
ALP SERPL-CCNC: 98 U/L — SIGNIFICANT CHANGE UP (ref 40–120)
ALT FLD-CCNC: 23 U/L — SIGNIFICANT CHANGE UP (ref 12–78)
ANION GAP SERPL CALC-SCNC: 5 MMOL/L — SIGNIFICANT CHANGE UP (ref 5–17)
APPEARANCE UR: ABNORMAL
APTT BLD: 20.2 SEC — LOW (ref 24.5–35.6)
AST SERPL-CCNC: 24 U/L — SIGNIFICANT CHANGE UP (ref 15–37)
BACTERIA # UR AUTO: ABNORMAL /HPF
BASOPHILS # BLD AUTO: 0.04 K/UL — SIGNIFICANT CHANGE UP (ref 0–0.2)
BASOPHILS NFR BLD AUTO: 0.3 % — SIGNIFICANT CHANGE UP (ref 0–2)
BILIRUB SERPL-MCNC: 0.4 MG/DL — SIGNIFICANT CHANGE UP (ref 0.2–1.2)
BILIRUB UR-MCNC: NEGATIVE — SIGNIFICANT CHANGE UP
BUN SERPL-MCNC: 25 MG/DL — HIGH (ref 7–23)
CALCIUM SERPL-MCNC: 10 MG/DL — SIGNIFICANT CHANGE UP (ref 8.5–10.1)
CAST: 6 /LPF — HIGH (ref 0–4)
CHLORIDE SERPL-SCNC: 105 MMOL/L — SIGNIFICANT CHANGE UP (ref 96–108)
CO2 SERPL-SCNC: 25 MMOL/L — SIGNIFICANT CHANGE UP (ref 22–31)
COLOR SPEC: YELLOW — SIGNIFICANT CHANGE UP
CREAT SERPL-MCNC: 1.15 MG/DL — SIGNIFICANT CHANGE UP (ref 0.5–1.3)
DIFF PNL FLD: ABNORMAL
EGFR: 45 ML/MIN/1.73M2 — LOW
EOSINOPHIL # BLD AUTO: 0.14 K/UL — SIGNIFICANT CHANGE UP (ref 0–0.5)
EOSINOPHIL NFR BLD AUTO: 1.2 % — SIGNIFICANT CHANGE UP (ref 0–6)
FLUAV AG NPH QL: SIGNIFICANT CHANGE UP
FLUBV AG NPH QL: SIGNIFICANT CHANGE UP
GLUCOSE SERPL-MCNC: 110 MG/DL — HIGH (ref 70–99)
GLUCOSE UR QL: NEGATIVE MG/DL — SIGNIFICANT CHANGE UP
HCT VFR BLD CALC: 42 % — SIGNIFICANT CHANGE UP (ref 34.5–45)
HGB BLD-MCNC: 13.8 G/DL — SIGNIFICANT CHANGE UP (ref 11.5–15.5)
IMM GRANULOCYTES NFR BLD AUTO: 0.4 % — SIGNIFICANT CHANGE UP (ref 0–0.9)
INR BLD: 0.9 RATIO — SIGNIFICANT CHANGE UP (ref 0.85–1.16)
KETONES UR-MCNC: NEGATIVE MG/DL — SIGNIFICANT CHANGE UP
LACTATE SERPL-SCNC: 1.6 MMOL/L — SIGNIFICANT CHANGE UP (ref 0.7–2)
LEUKOCYTE ESTERASE UR-ACNC: ABNORMAL
LYMPHOCYTES # BLD AUTO: 0.89 K/UL — LOW (ref 1–3.3)
LYMPHOCYTES # BLD AUTO: 7.5 % — LOW (ref 13–44)
MCHC RBC-ENTMCNC: 30.3 PG — SIGNIFICANT CHANGE UP (ref 27–34)
MCHC RBC-ENTMCNC: 32.9 GM/DL — SIGNIFICANT CHANGE UP (ref 32–36)
MCV RBC AUTO: 92.3 FL — SIGNIFICANT CHANGE UP (ref 80–100)
MONOCYTES # BLD AUTO: 1.02 K/UL — HIGH (ref 0–0.9)
MONOCYTES NFR BLD AUTO: 8.6 % — SIGNIFICANT CHANGE UP (ref 2–14)
NEUTROPHILS # BLD AUTO: 9.75 K/UL — HIGH (ref 1.8–7.4)
NEUTROPHILS NFR BLD AUTO: 82 % — HIGH (ref 43–77)
NITRITE UR-MCNC: NEGATIVE — SIGNIFICANT CHANGE UP
PH UR: 7.5 — SIGNIFICANT CHANGE UP (ref 5–8)
PLATELET # BLD AUTO: 160 K/UL — SIGNIFICANT CHANGE UP (ref 150–400)
POTASSIUM SERPL-MCNC: 4.4 MMOL/L — SIGNIFICANT CHANGE UP (ref 3.5–5.3)
POTASSIUM SERPL-SCNC: 4.4 MMOL/L — SIGNIFICANT CHANGE UP (ref 3.5–5.3)
PROT SERPL-MCNC: 7.9 GM/DL — SIGNIFICANT CHANGE UP (ref 6–8.3)
PROT UR-MCNC: 30 MG/DL
PROTHROM AB SERPL-ACNC: 10.4 SEC — SIGNIFICANT CHANGE UP (ref 9.9–13.4)
RBC # BLD: 4.55 M/UL — SIGNIFICANT CHANGE UP (ref 3.8–5.2)
RBC # FLD: 12.9 % — SIGNIFICANT CHANGE UP (ref 10.3–14.5)
RBC CASTS # UR COMP ASSIST: 5 /HPF — HIGH (ref 0–4)
RSV RNA NPH QL NAA+NON-PROBE: SIGNIFICANT CHANGE UP
SARS-COV-2 RNA SPEC QL NAA+PROBE: SIGNIFICANT CHANGE UP
SODIUM SERPL-SCNC: 135 MMOL/L — SIGNIFICANT CHANGE UP (ref 135–145)
SP GR SPEC: 1.02 — SIGNIFICANT CHANGE UP (ref 1–1.03)
SQUAMOUS # UR AUTO: >36 /HPF — HIGH (ref 0–5)
UROBILINOGEN FLD QL: 0.2 MG/DL — SIGNIFICANT CHANGE UP (ref 0.2–1)
WBC # BLD: 11.89 K/UL — HIGH (ref 3.8–10.5)
WBC # FLD AUTO: 11.89 K/UL — HIGH (ref 3.8–10.5)
WBC UR QL: 632 /HPF — HIGH (ref 0–5)

## 2024-10-10 PROCEDURE — 93010 ELECTROCARDIOGRAM REPORT: CPT

## 2024-10-10 PROCEDURE — 70450 CT HEAD/BRAIN W/O DYE: CPT | Mod: 26,MC

## 2024-10-10 PROCEDURE — 80048 BASIC METABOLIC PNL TOTAL CA: CPT

## 2024-10-10 PROCEDURE — 99285 EMERGENCY DEPT VISIT HI MDM: CPT

## 2024-10-10 PROCEDURE — 85025 COMPLETE CBC W/AUTO DIFF WBC: CPT

## 2024-10-10 PROCEDURE — 83735 ASSAY OF MAGNESIUM: CPT

## 2024-10-10 PROCEDURE — 82962 GLUCOSE BLOOD TEST: CPT

## 2024-10-10 PROCEDURE — 97535 SELF CARE MNGMENT TRAINING: CPT | Mod: GO

## 2024-10-10 PROCEDURE — 70450 CT HEAD/BRAIN W/O DYE: CPT | Mod: MC

## 2024-10-10 PROCEDURE — 71045 X-RAY EXAM CHEST 1 VIEW: CPT | Mod: 26

## 2024-10-10 PROCEDURE — 83036 HEMOGLOBIN GLYCOSYLATED A1C: CPT

## 2024-10-10 PROCEDURE — 97116 GAIT TRAINING THERAPY: CPT | Mod: GP

## 2024-10-10 PROCEDURE — 95816 EEG AWAKE AND DROWSY: CPT

## 2024-10-10 PROCEDURE — 93005 ELECTROCARDIOGRAM TRACING: CPT

## 2024-10-10 PROCEDURE — 36415 COLL VENOUS BLD VENIPUNCTURE: CPT

## 2024-10-10 PROCEDURE — 76376 3D RENDER W/INTRP POSTPROCES: CPT

## 2024-10-10 PROCEDURE — 97110 THERAPEUTIC EXERCISES: CPT | Mod: GP

## 2024-10-10 PROCEDURE — 97530 THERAPEUTIC ACTIVITIES: CPT | Mod: GP

## 2024-10-10 PROCEDURE — 93306 TTE W/DOPPLER COMPLETE: CPT

## 2024-10-10 PROCEDURE — 97166 OT EVAL MOD COMPLEX 45 MIN: CPT | Mod: GO

## 2024-10-10 PROCEDURE — 80061 LIPID PANEL: CPT

## 2024-10-10 PROCEDURE — 85027 COMPLETE CBC AUTOMATED: CPT

## 2024-10-10 PROCEDURE — 93880 EXTRACRANIAL BILAT STUDY: CPT

## 2024-10-10 PROCEDURE — 84484 ASSAY OF TROPONIN QUANT: CPT

## 2024-10-10 PROCEDURE — 97162 PT EVAL MOD COMPLEX 30 MIN: CPT | Mod: GP

## 2024-10-10 RX ORDER — LABETALOL HYDROCHLORIDE 200 MG/1
5 TABLET, FILM COATED ORAL ONCE
Refills: 0 | Status: COMPLETED | OUTPATIENT
Start: 2024-10-10 | End: 2024-10-11

## 2024-10-10 RX ORDER — SODIUM CHLORIDE 0.9 % (FLUSH) 0.9 %
1000 SYRINGE (ML) INJECTION ONCE
Refills: 0 | Status: COMPLETED | OUTPATIENT
Start: 2024-10-10 | End: 2024-10-10

## 2024-10-10 RX ORDER — CEFTRIAXONE SODIUM 1 G
1000 VIAL (EA) INJECTION ONCE
Refills: 0 | Status: COMPLETED | OUTPATIENT
Start: 2024-10-10 | End: 2024-10-10

## 2024-10-10 RX ADMIN — Medication 1000 MILLILITER(S): at 21:14

## 2024-10-10 RX ADMIN — Medication 1000 MILLIGRAM(S): at 23:33

## 2024-10-10 NOTE — ED ADULT TRIAGE NOTE - CHIEF COMPLAINT QUOTE
Patient BIB ems from home c/o generalized weakness starting this morning and worsening around 6pm today. Patient's daughter reports that at 6pm she attempted to get the patient to the bathroom using a walker and assistance but they were unable to get her out of the chair. Patient denies fever, chest pain, sob. Strength equal in all 4 limbs. Patient daughter states that the patient fell 2 days ago; denies headstrike and blood thinners. PMH of dementia

## 2024-10-10 NOTE — ED PROVIDER NOTE - CARE PLAN
1 Principal Discharge DX:	Acute cerebral infarction  Secondary Diagnosis:	Cerebral lesion  Secondary Diagnosis:	UTI (urinary tract infection)

## 2024-10-10 NOTE — ED PROVIDER NOTE - OBJECTIVE STATEMENT
89 y/o female w/PMHx of HTN, HLD, AS s/p TAVR, MR, PPM, Hypothyroidism, GERD, OA/RA (On Prednisone), pacemaker. Pt is presenting to the ED BIB daughter s/p unwitnessed fall x2 days ago. Daughter reports that she does not think that Pt had HS because she saw/palpated Pt's head and felt no abnormalities. Daughter states that Pt appeared at baseline yesterday, but today Pt has been sleeping a lot/ acting more lethargic than usual, and was unable to ambulate at baseline. Daughter mentions that at baseline Pt requires a walker. Pt denies dysuria, abdominal pain, and vomiting.

## 2024-10-10 NOTE — CONSULT NOTE ADULT - SUBJECTIVE AND OBJECTIVE BOX
HPI:  89 y/o female w/PMHx of HTN, HLD, AS s/p TAVR, MR, PPM, Hypothyroidism, GERD, OA/RA (On Prednisone), pacemaker, right burrholes for hematoma evacuation (2015). Patient is presenting to the ED BIB daughter s/p unwitnessed fall 2 days ago. Daughter reports that she does not think that patient hit her head because she saw/palpated patient's head and felt no abnormalities. Daughter states that patient appeared at baseline yesterday, but today patinet has been sleeping a lot/ acting more lethargic than usual, and was unable to ambulate at baseline. Daughter mentions that at baseline patient requires a walker. Patient denies headache, dizziness, n/v, changes in vision. Denies taking any AC/AP.     PAST MEDICAL & SURGICAL HISTORY:  Intraventricular block      Actinic keratosis      Aortic valve stenosis      Back pain      Hard of hearing      Subdural hematoma      Dry eyes      Fatigue      HLD (hyperlipidemia)      HTN (hypertension)      GERD (gastroesophageal reflux disease)      Syncope and collapse      Dehydration      Hypothyroid      MR (mitral regurgitation)      Nocturia      Urine incontinence      Osteoarthritis of back      H/O brain surgery  subdural  bleed,  had  chris holes      Bilateral cataracts      History of tonsillectomy      History of cholecystectomy      H/O hand surgery      Pacemaker      S/P TAVR (transcatheter aortic valve replacement)          FAMILY HISTORY:  Family history of heart attack (Father)       Noncontributory     Social Hx:  Nonsmoker, no drug or alcohol use    Allergies    No Known Allergies    Intolerances    aspirin (Other)      MEDICATIONS  (STANDING):  cefTRIAXone Injectable. 1000 milliGRAM(s) IV Push Once       ROS: Pertinent positives in HPI, all other ROS were reviewed and are negative.      Vital Signs Last 24 Hrs  T(C): 37.3 (10 Oct 2024 19:46), Max: 37.3 (10 Oct 2024 19:46)  T(F): 99.2 (10 Oct 2024 19:46), Max: 99.2 (10 Oct 2024 19:46)  HR: 84 (10 Oct 2024 19:46) (84 - 84)  BP: 146/84 (10 Oct 2024 19:46) (146/84 - 146/84)  BP(mean): --  RR: 18 (10 Oct 2024 19:46) (18 - 18)  SpO2: 96% (10 Oct 2024 19:46) (96% - 96%)    Parameters below as of 10 Oct 2024 19:46  Patient On (Oxygen Delivery Method): room air        Physical Exam:  Constitutional: Awake / alert  HEENT: PERRL, EOMI  Neck: Supple  Respiratory: Respirations non-labored  Cardiovascular: regular rate  Gastrointestinal: Soft, NT/ND  Extremities:  no edema  Musculoskeletal: no abnormal movements  Skin: No rashes    Neurological Exam:  HF: AA&Ox1-2, follows commands, normal affect, speech fluent  CN: PERRL, EOMI, no NLFD, tongue midline, symmetric shoulder elevation   Motor: Strength 5/5 in all 4 ext, normal bulk and tone  Sens: Intact to light touch  Reflexes: Symmetric and normal, no clonus, no Soto's   Coord:  No dysmetria  Gait/Balance: Cannot test    Labs:                        13.8   11.89 )-----------( 160      ( 10 Oct 2024 20:41 )             42.0     10-10    135  |  105  |  25[H]  ----------------------------<  110[H]  4.4   |  25  |  1.15    Ca    10.0      10 Oct 2024 20:41    TPro  7.9  /  Alb  3.6  /  TBili  0.4  /  DBili  x   /  AST  24  /  ALT  23  /  AlkPhos  98  10-10        PT/INR - ( 10 Oct 2024 20:41 )   PT: 10.4 sec;   INR: 0.90 ratio         PTT - ( 10 Oct 2024 20:41 )  PTT:20.2 sec        Radiology:  CT Head No Cont (10.10.24 @ 21:37)   IMPRESSION:  Acute/subacute infarct of the medial right occipital/temporal lobe. No   midline shift or herniation. No associated hemorrhage.    A 5 mm rounded hyperdensity in the right parietal white matter with   adjacent minimal edema is new compared with 2022, concerning for small   hemorrhage given history of fall. Possibility of underlying lesion is not   excluded.             HPI:  89 y/o female w/PMHx of HTN, HLD, AS s/p TAVR, MR, PPM, Hypothyroidism, GERD, OA/RA (On Prednisone), pacemaker, right burrholes for hematoma evacuation (2015). Patient is presenting to the ED BIB daughter s/p unwitnessed fall 2 days ago. Daughter reports that she does not think that patient hit her head because she saw/palpated patient's head and felt no abnormalities. Daughter states that patient appeared at baseline yesterday, but today patient has been sleeping a lot/ acting more lethargic than usual, and was unable to ambulate at baseline. Daughter mentions that at baseline patient requires a walker. Patient denies headache, dizziness, n/v, changes in vision. Denies taking any AC/AP.     PAST MEDICAL & SURGICAL HISTORY:  Intraventricular block      Actinic keratosis      Aortic valve stenosis      Back pain      Hard of hearing      Subdural hematoma      Dry eyes      Fatigue      HLD (hyperlipidemia)      HTN (hypertension)      GERD (gastroesophageal reflux disease)      Syncope and collapse      Dehydration      Hypothyroid      MR (mitral regurgitation)      Nocturia      Urine incontinence      Osteoarthritis of back      H/O brain surgery  subdural  bleed,  had  chris holes      Bilateral cataracts      History of tonsillectomy      History of cholecystectomy      H/O hand surgery      Pacemaker      S/P TAVR (transcatheter aortic valve replacement)          FAMILY HISTORY:  Family history of heart attack (Father)       Noncontributory     Social Hx:  Nonsmoker, no drug or alcohol use    Allergies    No Known Allergies    Intolerances    aspirin (Other)      MEDICATIONS  (STANDING):  cefTRIAXone Injectable. 1000 milliGRAM(s) IV Push Once       ROS: Pertinent positives in HPI, all other ROS were reviewed and are negative.      Vital Signs Last 24 Hrs  T(C): 37.3 (10 Oct 2024 19:46), Max: 37.3 (10 Oct 2024 19:46)  T(F): 99.2 (10 Oct 2024 19:46), Max: 99.2 (10 Oct 2024 19:46)  HR: 84 (10 Oct 2024 19:46) (84 - 84)  BP: 146/84 (10 Oct 2024 19:46) (146/84 - 146/84)  BP(mean): --  RR: 18 (10 Oct 2024 19:46) (18 - 18)  SpO2: 96% (10 Oct 2024 19:46) (96% - 96%)    Parameters below as of 10 Oct 2024 19:46  Patient On (Oxygen Delivery Method): room air        Physical Exam:  Constitutional: Awake / alert  HEENT: PERRL, EOMI  Neck: Supple  Respiratory: Respirations non-labored  Cardiovascular: regular rate  Gastrointestinal: Soft, NT/ND  Extremities:  no edema  Musculoskeletal: no abnormal movements  Skin: No rashes    Neurological Exam:  HF: AA&Ox1-2, follows commands, normal affect, speech fluent  CN: PERRL, EOMI, no NLFD, tongue midline, symmetric shoulder elevation   Motor: Strength 5/5 in all 4 ext, normal bulk and tone  Sens: Intact to light touch  Reflexes: Symmetric and normal, no clonus, no Soto's   Coord:  No dysmetria  Gait/Balance: Cannot test    Labs:                        13.8   11.89 )-----------( 160      ( 10 Oct 2024 20:41 )             42.0     10-10    135  |  105  |  25[H]  ----------------------------<  110[H]  4.4   |  25  |  1.15    Ca    10.0      10 Oct 2024 20:41    TPro  7.9  /  Alb  3.6  /  TBili  0.4  /  DBili  x   /  AST  24  /  ALT  23  /  AlkPhos  98  10-10        PT/INR - ( 10 Oct 2024 20:41 )   PT: 10.4 sec;   INR: 0.90 ratio         PTT - ( 10 Oct 2024 20:41 )  PTT:20.2 sec        Radiology:  CT Head No Cont (10.10.24 @ 21:37)   IMPRESSION:  Acute/subacute infarct of the medial right occipital/temporal lobe. No   midline shift or herniation. No associated hemorrhage.    A 5 mm rounded hyperdensity in the right parietal white matter with   adjacent minimal edema is new compared with 2022, concerning for small   hemorrhage given history of fall. Possibility of underlying lesion is not   excluded.

## 2024-10-10 NOTE — ED ADULT NURSE NOTE - OBJECTIVE STATEMENT
Pt c/o weakness x 2 days. Pt had unwitnessed fall two days ago per daughter. Daughter reports pt was acting appropriately yesterday and then today was unable to ambulate. AO x 1 (oriented to self). Hx of dementia.

## 2024-10-10 NOTE — ED PROVIDER NOTE - CLINICAL SUMMARY MEDICAL DECISION MAKING FREE TEXT BOX
89-year-old female presents to the emergency department for generalized weakness s/p mechanical fall few days ago.  Family at home unable to help patient to the degree which she requires assistance.  No focal findings on examination.  Differential including but not limited to: Subdural hemorrhage, other intracranial pathology, infection, other.  Will evaluate with blood work, CT head, UA, chest x-ray, reassess.  Given degree of weakness, patient will likely require admission for PT and social work eval.

## 2024-10-10 NOTE — ED ADULT NURSE NOTE - NS ED NURSE LEVEL OF CONSCIOUSNESS AFFECT
Rash associated with irritation, itching, pain over your left arm, abdominal area likely related to local inflammation related to bee sting.  Also explained likely early cellulitis (infection).    Start taking antibiotic as prescribed, possible side effects were discussed, may take over-the-counter probiotic as needed.  Start taking Medrol Dosepak with meals as prescribed, possible side effects were discussed.  May take Claritin as prescribed to help with itching.    You reported to have taken Benadryl at home to help with itching, explained possible interaction with Benadryl and had lorazepam that you take that can cause increased pain suppression which could be life-threatening, recommend to avoid taking Benadryl.  Also discussed possible interaction between Claritin and lorazepam which may also cause suppression of brain function but as per literature it may be not as severe as it would be with Benadryl.    Keep area clean, apply cold compresses, avoid scratching.  Follow-up tomorrow with primary care physician or at this office for recheck, may report to ER sooner if worsening.  
Calm

## 2024-10-10 NOTE — CONSULT NOTE ADULT - ASSESSMENT
89yoF with fall 2 days ago, found to have a right occipital infarct and small right parietal hyperdensity concerning for hemorrhage     Plan:  - No neurosurgical interventions indicated at this time  - Repeat CT head in 4-6 hours, sooner if there is a decline in neuro exam  - If repeat CT head is stable, patient can be cleared for any AC/AP as needed  - Neurology consult for occipital stroke   - No indication for keppra  - Recommend admission to medicine for additional work-up/ management of symptoms     Discussed with Dr. Mendoza

## 2024-10-10 NOTE — ED ADULT NURSE NOTE - NSFALLHARMRISKINTERV_ED_ALL_ED

## 2024-10-10 NOTE — CONSULT NOTE ADULT - SUPERVISING ATTENDING
I personally reviewed the patient's imaging. History and plan discussed with PA team, agree with above. Repeat 6h head CT stable. No need for additional imaging unless change in exam; Ok for AC/AP/chemical DVT ppx. Signing off, can follow up outpt.

## 2024-10-10 NOTE — ED PROVIDER NOTE - PHYSICAL EXAMINATION
GENERAL: A&Ox4, non-toxic appearing, no acute distress  HEENT: NCAT, EOMI, oral mucosa moist, normal conjunctiva  RESP: CTAB, no respiratory distress, no wheezes/rhonchi/rales, speaking in full sentences  CV: RRR, no murmurs/rubs/gallops  ABDOMEN: soft, non-tender, non-distended, no guarding, no rebound tenderness  MSK: no visible deformities, Pelvis stable  NEURO: no focal sensory or motor deficits, CN 2-12 grossly intact  SKIN: warm, normal color, well perfused, no rash  PSYCH: normal affect

## 2024-10-10 NOTE — ED PROVIDER NOTE - NS ED ATTENDING STATEMENT MOD
Take the antibiotics as prescribed  Take Medrol Dosepak with food  Tylenol for the pain  Get the lab work done  Follow-up with the ENT  Return to clinic earlier if any problem   Attending Only

## 2024-10-11 ENCOUNTER — TRANSCRIPTION ENCOUNTER (OUTPATIENT)
Age: 89
End: 2024-10-11

## 2024-10-11 ENCOUNTER — RESULT REVIEW (OUTPATIENT)
Age: 89
End: 2024-10-11

## 2024-10-11 DIAGNOSIS — I63.9 CEREBRAL INFARCTION, UNSPECIFIED: ICD-10-CM

## 2024-10-11 LAB
ANION GAP SERPL CALC-SCNC: 3 MMOL/L — LOW (ref 5–17)
BUN SERPL-MCNC: 22 MG/DL — SIGNIFICANT CHANGE UP (ref 7–23)
CALCIUM SERPL-MCNC: 9.3 MG/DL — SIGNIFICANT CHANGE UP (ref 8.5–10.1)
CHLORIDE SERPL-SCNC: 108 MMOL/L — SIGNIFICANT CHANGE UP (ref 96–108)
CO2 SERPL-SCNC: 29 MMOL/L — SIGNIFICANT CHANGE UP (ref 22–31)
CREAT SERPL-MCNC: 0.98 MG/DL — SIGNIFICANT CHANGE UP (ref 0.5–1.3)
EGFR: 54 ML/MIN/1.73M2 — LOW
GLUCOSE SERPL-MCNC: 126 MG/DL — HIGH (ref 70–99)
POTASSIUM SERPL-MCNC: 4 MMOL/L — SIGNIFICANT CHANGE UP (ref 3.5–5.3)
POTASSIUM SERPL-SCNC: 4 MMOL/L — SIGNIFICANT CHANGE UP (ref 3.5–5.3)
SODIUM SERPL-SCNC: 140 MMOL/L — SIGNIFICANT CHANGE UP (ref 135–145)
TROPONIN I, HIGH SENSITIVITY RESULT: 23.42 NG/L — SIGNIFICANT CHANGE UP

## 2024-10-11 PROCEDURE — 99497 ADVNCD CARE PLAN 30 MIN: CPT | Mod: 25

## 2024-10-11 PROCEDURE — 99223 1ST HOSP IP/OBS HIGH 75: CPT

## 2024-10-11 PROCEDURE — 93306 TTE W/DOPPLER COMPLETE: CPT | Mod: 26

## 2024-10-11 PROCEDURE — 93280 PM DEVICE PROGR EVAL DUAL: CPT | Mod: 26

## 2024-10-11 PROCEDURE — 99498 ADVNCD CARE PLAN ADDL 30 MIN: CPT

## 2024-10-11 PROCEDURE — 70450 CT HEAD/BRAIN W/O DYE: CPT | Mod: 26

## 2024-10-11 PROCEDURE — 76376 3D RENDER W/INTRP POSTPROCES: CPT | Mod: 26

## 2024-10-11 RX ORDER — METOPROLOL TARTRATE 50 MG
25 TABLET ORAL DAILY
Refills: 0 | Status: DISCONTINUED | OUTPATIENT
Start: 2024-10-11 | End: 2024-10-16

## 2024-10-11 RX ORDER — HYDRALAZINE HYDROCHLORIDE 100 MG/1
10 TABLET ORAL ONCE
Refills: 0 | Status: COMPLETED | OUTPATIENT
Start: 2024-10-11 | End: 2024-10-11

## 2024-10-11 RX ORDER — MULTI VITAMIN/MINERAL SUPPLEMENT WITH ASCORBIC ACID, NIACIN, PYRIDOXINE, PANTOTHENIC ACID, FOLIC ACID, RIBOFLAVIN, THIAMIN, BIOTIN, COBALAMIN AND ZINC. 60; 20; 12.5; 10; 10; 1.7; 1.5; 1; .3; .006 MG/1; MG/1; MG/1; MG/1; MG/1; MG/1; MG/1; MG/1; MG/1; MG/1
1 TABLET, COATED ORAL DAILY
Refills: 0 | Status: DISCONTINUED | OUTPATIENT
Start: 2024-10-11 | End: 2024-10-16

## 2024-10-11 RX ORDER — METOPROLOL TARTRATE 50 MG
25 TABLET ORAL ONCE
Refills: 0 | Status: COMPLETED | OUTPATIENT
Start: 2024-10-11 | End: 2024-10-11

## 2024-10-11 RX ORDER — ATORVASTATIN CALCIUM 10 MG/1
40 TABLET, FILM COATED ORAL AT BEDTIME
Refills: 0 | Status: DISCONTINUED | OUTPATIENT
Start: 2024-10-11 | End: 2024-10-16

## 2024-10-11 RX ORDER — MAG HYDROX/ALUMINUM HYD/SIMETH 200-200-20
30 SUSPENSION, ORAL (FINAL DOSE FORM) ORAL EVERY 4 HOURS
Refills: 0 | Status: DISCONTINUED | OUTPATIENT
Start: 2024-10-11 | End: 2024-10-16

## 2024-10-11 RX ORDER — CEFTRIAXONE SODIUM 1 G
1000 VIAL (EA) INJECTION EVERY 24 HOURS
Refills: 0 | Status: COMPLETED | OUTPATIENT
Start: 2024-10-11 | End: 2024-10-13

## 2024-10-11 RX ORDER — ACETAMINOPHEN 325 MG
650 TABLET ORAL EVERY 6 HOURS
Refills: 0 | Status: DISCONTINUED | OUTPATIENT
Start: 2024-10-11 | End: 2024-10-16

## 2024-10-11 RX ORDER — LABETALOL HYDROCHLORIDE 200 MG/1
5 TABLET, FILM COATED ORAL ONCE
Refills: 0 | Status: COMPLETED | OUTPATIENT
Start: 2024-10-11 | End: 2024-10-11

## 2024-10-11 RX ORDER — 5-HYDROXYTRYPTOPHAN (5-HTP) 100 MG
3 TABLET,DISINTEGRATING ORAL AT BEDTIME
Refills: 0 | Status: DISCONTINUED | OUTPATIENT
Start: 2024-10-11 | End: 2024-10-16

## 2024-10-11 RX ORDER — ONDANSETRON HCL/PF 4 MG/2 ML
4 VIAL (ML) INJECTION EVERY 8 HOURS
Refills: 0 | Status: DISCONTINUED | OUTPATIENT
Start: 2024-10-11 | End: 2024-10-16

## 2024-10-11 RX ORDER — ASPIRIN 325 MG
81 TABLET ORAL DAILY
Refills: 0 | Status: DISCONTINUED | OUTPATIENT
Start: 2024-10-11 | End: 2024-10-16

## 2024-10-11 RX ADMIN — LABETALOL HYDROCHLORIDE 5 MILLIGRAM(S): 200 TABLET, FILM COATED ORAL at 02:35

## 2024-10-11 RX ADMIN — Medication 650 MILLIGRAM(S): at 21:15

## 2024-10-11 RX ADMIN — LABETALOL HYDROCHLORIDE 5 MILLIGRAM(S): 200 TABLET, FILM COATED ORAL at 00:07

## 2024-10-11 RX ADMIN — HYDRALAZINE HYDROCHLORIDE 10 MILLIGRAM(S): 100 TABLET ORAL at 15:57

## 2024-10-11 RX ADMIN — Medication 25 MILLIGRAM(S): at 06:35

## 2024-10-11 RX ADMIN — MULTI VITAMIN/MINERAL SUPPLEMENT WITH ASCORBIC ACID, NIACIN, PYRIDOXINE, PANTOTHENIC ACID, FOLIC ACID, RIBOFLAVIN, THIAMIN, BIOTIN, COBALAMIN AND ZINC. 1 TABLET(S): 60; 20; 12.5; 10; 10; 1.7; 1.5; 1; .3; .006 TABLET, COATED ORAL at 10:50

## 2024-10-11 RX ADMIN — ATORVASTATIN CALCIUM 40 MILLIGRAM(S): 10 TABLET, FILM COATED ORAL at 20:16

## 2024-10-11 RX ADMIN — Medication 81 MILLIGRAM(S): at 10:51

## 2024-10-11 RX ADMIN — Medication 1000 MILLIGRAM(S): at 10:52

## 2024-10-11 RX ADMIN — Medication 650 MILLIGRAM(S): at 20:17

## 2024-10-11 NOTE — H&P ADULT - ASSESSMENT
89 y/o female w/PMHx of HTN, HLD, AS s/p TAVR, MR, PPM, Hypothyroidism, GERD, OA/RA , pacemaker, right burrholes for hematoma evacuation (2015) presents s/p fall several days ago and increase sleep and lethargy per daughter. Dt r provides most of history, pt [place] and provides only partial history. Pt fell 2 days ago, unclear if true syncope - dtr felt there was no headstrike as she examined patient and did not feel notice any traumatic physical sequelae from fall. PT could not confirm HS or not. She states she felt fine. No preceding cp or sob. No urinary ssx - UA in ED appears infected. No recent fevers or chills.     ED course: CTH: acute vs subacute cva; 5mm R parietal lobe hemorrhage vs lesion, repeat performed, stable. Evaluated by neurosx, no acute surgical intervention, OK to start A/C if needed.   Made dtr aware.   Start on CFTX in ED  BP uncontrolled, s/p IV labetolol 5mg x 2, metoprolol 25mg po x 1          #Acute vs subacute CVA  #Right sided 5mm parietal lobe hemorrhage   #Fall vs syncope  #UTI  - admit to tele  - neuro checks q4h  - fall precautions  - repeat CTH stable  - OK to start ASA per neurosx  - TTE  - BP control  - PPM interrogation  - Trops  - Lipid / A1C  - ASA/ statin  - bedside dysphagia screen  - neuro consult  - CFTX  - urine cultures  - DVT px: SCDs      DNR/I  -d/w dtr  Time spent  coordinating the patient's care. This includes reviewing documentation pertinent to this admission, results and imaging. Further tests, medications, and procedures have been ordered as indicated. Laboratory results and the plan of care were communicated to the patient and family.  Discussed care plan with nursing and will discuss plan and care with appropriate   consultant(s). Supporting documentation was completed and added to the patient's chart incuding updated MOLST.     I spent 75 minutes during this encounter

## 2024-10-11 NOTE — H&P ADULT - PATIENT'S PREFERRED PRONOUN
ERP at bedside. Pt agrees with plan of care discussed by ERP. AIDET acknowledged with patient. IV established. Blood sent to lab. Orthostatics done. [place] in low position, side rail up for pt safety. Call light within reach. Will continue to monitor.   Her/She

## 2024-10-11 NOTE — H&P ADULT - NSHPPHYSICALEXAM_GEN_ALL_CORE
ICU Vital Signs Last 24 Hrs  T(C): 37.2 (11 Oct 2024 07:12), Max: 37.3 (10 Oct 2024 19:46)  T(F): 99 (11 Oct 2024 07:12), Max: 99.2 (10 Oct 2024 19:46)  HR: 78 (11 Oct 2024 07:12) (77 - 88)  BP: 176/85 (11 Oct 2024 07:12) (146/84 - 215/102)  BP(mean): 106 (11 Oct 2024 02:41) (99 - 127)  ABP: --  ABP(mean): --  RR: 18 (11 Oct 2024 07:12) (18 - 27)  SpO2: 99% (11 Oct 2024 07:12) (96% - 100%)    O2 Parameters below as of 11 Oct 2024 07:12  Patient On (Oxygen Delivery Method): room air            PHYSICAL EXAM:    Constitutional: NAD, awake and alert  HEENT: PERR, EOMI, Normal Hearing, MMM  Neck: Soft and supple, No LAD, No JVD  Respiratory: Breath sounds are clear bilaterally, No wheezing, rales or rhonchi  Cardiovascular: S1 and S2, regular rate and rhythm, no Murmurs, gallops or rubs  Gastrointestinal: Bowel Sounds present, soft, nontender, nondistended, no guarding, no rebound  Extremities: No peripheral edema  Vascular: 2+ peripheral pulses  Neurological: A/O x 3, no focal deficits  Musculoskeletal: 5/5 strength b/l upper and lower extremities  Skin: No rashes

## 2024-10-11 NOTE — DISCHARGE NOTE NURSING/CASE MANAGEMENT/SOCIAL WORK - PATIENT PORTAL LINK FT
You can access the FollowMyHealth Patient Portal offered by [facility] by registering at the following website: [email or web address]. By joining [employer]’s FollowMyHealth portal, you will also be able to view your health information using other applications (apps) compatible with our system.

## 2024-10-11 NOTE — PHYSICAL THERAPY INITIAL EVALUATION ADULT - GENERAL OBSERVATIONS, REHAB EVAL
Pt was found lying in bed on tele, pulse oxy, Pt is pleasantly confused, willing to participate in PT

## 2024-10-11 NOTE — PHYSICAL THERAPY INITIAL EVALUATION ADULT - PERTINENT HX OF CURRENT PROBLEM, REHAB EVAL
89 y/o female w/PMHx of HTN, HLD, AS s/p TAVR, MR, PPM, Hypothyroidism, GERD, OA/RA , pacemaker, right chris holes for hematoma evacuation (2015) presents s/p fall several days ago and increase sleep and lethargy per daughter. Dtr provides most of history, pt [place] and provides only partial history. Pt fell 2 days ago, unclear if true syncope - dtr felt there was no head strike as she examined patient and did not feel notice any traumatic physical sequelae from fall. CTH: acute vs subacute cva; 5mm R parietal lobe hemorrhage vs lesion,

## 2024-10-11 NOTE — PATIENT PROFILE ADULT - FUNCTIONAL ASSESSMENT - BASIC MOBILITY 3.
Quality 226: Preventive Care And Screening: Tobacco Use: Screening And Cessation Intervention: Patient screened for tobacco use and is an ex/non-smoker
Quality 130: Documentation Of Current Medications In The Medical Record: Current Medications Documented
Detail Level: Detailed
Quality 431: Preventive Care And Screening: Unhealthy Alcohol Use - Screening: Patient not identified as an unhealthy alcohol user when screened for unhealthy alcohol use using a systematic screening method
2 = A lot of assistance

## 2024-10-11 NOTE — PHARMACOTHERAPY INTERVENTION NOTE - COMMENTS
Med rec completed with family member, Dominga via telephone. Medications confirmed using Dr. [facility] Hx. Of note, patient was prescribed a 90 day supply of losartan 25 mg QD on 10/7. Dominga states the patient took one dose and became very hypotensive, so they were told to not give it anymore. Patient's primary doctor is Jeff Adams. All questions answered appropriately.

## 2024-10-11 NOTE — PROCEDURE NOTE - ADDITIONAL PROCEDURE DETAILS
Dual chamber pacemaker with normal function, adequate sensing and pacing thresholds.  Stored data with no recent events for review.  Patient has [place] [employer] pulse generator and MDT RA,RV leads.  Device is NOT MRI compatible.

## 2024-10-11 NOTE — PATIENT PROFILE ADULT - ARRIVAL FROM
Called and told her that chantal wants to send her for a hip injection at [facility].     Gave her the # for central scheduling
Patient is wanting to know when she will get her TR on her MRI and what will be the next step.
Home

## 2024-10-11 NOTE — CONSULT NOTE ADULT - ASSESSMENT
89 y/o RH woman PMHx of dementia, HTN, HLD, AS s/p TAVR, MR, PPM, Hypothyroidism prior craniotomy for SDH evacuation 2015. ADM for weakness, fall. On exam has L HH, otherwise non focal, CT head x 2, R PCA infarct, ?  R parietal lobe hemorrhage. Evaluated by neurosx, no acute surgical intervention.  Hx of PPM, not MR compatible, no recent arrhythmias.  Suggest:  f/u B/p, aim systolic <140  asa 81 mg po qd  statin  PT  EEG  can f/u OPT CT head in 2-3 weeks    Discussed with Pt daughter at bedside.

## 2024-10-11 NOTE — CHART NOTE - NSCHARTNOTEFT_GEN_A_CORE
Repeat CT head showing stable exam.   - No role neurosurgical interventions.   - patient can be cleared for any AC/AP as needed  - Neurology consult for occipital stroke   - No indication for keppra  - Can follow up with Dr. Mendoza outpatient in 4 weeks with repeat CT head     Discussed with Dr. Mendoza

## 2024-10-11 NOTE — ED ADULT NURSE REASSESSMENT NOTE - NS ED NURSE REASSESS COMMENT FT1
resumed care from augustine HICKS. pt awake, respirations even and unlabored, acting within baseline per report received,

## 2024-10-11 NOTE — H&P ADULT - CONVERSATION DETAILS
All risks and benefits regarding CPR and mechanical ventilation were thoroughly discussed with patient/family. Discussion included but were not limited to the likelihood of successful ROSC in the event patient was found unresponsive and without a pulse and/or in respiratory arrest. Patient and/or family were allowed to ask pertinent questions regarding the MOLST form.  Patient and/or family were able to relay pertinent information regarding the MOLST form in layman's terms to this writer.    Time spent discussing Goals of care and MOLST form: 31 minutes    DNR/I

## 2024-10-11 NOTE — CONSULT NOTE ADULT - SUBJECTIVE AND OBJECTIVE BOX
Neurology Consult requested by:   Patient is a 89y old  Female who presents with a chief complaint of fall vs syncope (11 Oct 2024 08:01)     HPI:  89 y/o woman RH, PMHx dementia, HTN, HLD, AS s/p TAVR, MR, PPM, Hypothyroidism, GERD, OA/RA , pacemaker, right burrholes for hematoma evacuation (2015) ADM for general weakness, unable to walk, recent fall, wo LOC, head injury. On w/u CTH: acute vs subacute cva; 5mm R parietal lobe hemorrhage vs lesion, repeat performed, stable. Evaluated by neurosx, no acute surgical intervention, OK to start A/C if needed.   Pt presently denies headache, dizziness, unilateral headache, numbness of the extremities. No SOB, dizziness, CP.  As per daughter at the bedside, hx of dementia, worsening memory and cognition for the last 4-5 years, worsening.           PAST MEDICAL & SURGICAL HISTORY:  Intraventricular block      Actinic keratosis      Aortic valve stenosis      Back pain      Hard of hearing      Subdural hematoma      Dry eyes      Fatigue      HLD (hyperlipidemia)      HTN (hypertension)      GERD (gastroesophageal reflux disease)      Syncope and collapse      Dehydration      Hypothyroid      MR (mitral regurgitation)      Nocturia      Urine incontinence      Osteoarthritis of back      H/O brain surgery  subdural  bleed,  had  chris holes      Bilateral cataracts      History of tonsillectomy      History of cholecystectomy      H/O hand surgery      Pacemaker      S/P TAVR (transcatheter aortic valve replacement)          FAMILY HISTORY:  Family history of heart attack (Father)       Noncontributory     Social Hx:  Nonsmoker, no drug or alcohol use    Allergies    No Known Allergies    Intolerances    aspirin (Other)      MEDICATIONS  (STANDING):  cefTRIAXone Injectable. 1000 milliGRAM(s) IV Push Once        (11 Oct 2024 08:01)      PAST MEDICAL & SURGICAL HISTORY:  Intraventricular block      Actinic keratosis      Aortic valve stenosis      Back pain      Hard of hearing      Subdural hematoma      Dry eyes      Fatigue      HLD (hyperlipidemia)      HTN (hypertension)      GERD (gastroesophageal reflux disease)      Syncope and collapse      Dehydration      Hypothyroid      MR (mitral regurgitation)      Nocturia      Urine incontinence      Osteoarthritis of back      H/O brain surgery  subdural  bleed,  had  chris holes      Bilateral cataracts      History of tonsillectomy      History of cholecystectomy      H/O hand surgery      Pacemaker      S/P TAVR (transcatheter aortic valve replacement)        FAMILY HISTORY:  Family history of heart attack (Father)      Social Hx:  Nonsmoker, no drug or alcohol use  Medications and Allergies ReviewedMEDICATIONS  (STANDING):  aspirin  chewable 81 milliGRAM(s) Oral daily  cefTRIAXone Injectable. 1000 milliGRAM(s) IV Push every 24 hours  levothyroxine 100 MICROGram(s) Oral daily  metoprolol succinate ER 25 milliGRAM(s) Oral daily  multivitamin 1 Tablet(s) Oral daily     ROS: Pertinent positives in HPI, all other ROS were reviewed and are negative.      Examination:   Vital Signs Last 24 Hrs  T(C): 37.2 (11 Oct 2024 07:12), Max: 37.3 (10 Oct 2024 19:46)  T(F): 99 (11 Oct 2024 07:12), Max: 99.2 (10 Oct 2024 19:46)  HR: 74 (11 Oct 2024 09:38) (74 - 88)  BP: 139/69 (11 Oct 2024 09:38) (139/69 - 215/102)  BP(mean): 106 (11 Oct 2024 02:41) (99 - 127)  RR: 18 (11 Oct 2024 07:12) (18 - 27)  SpO2: 99% (11 Oct 2024 07:12) (96% - 100%)    Parameters below as of 11 Oct 2024 07:12  Patient On (Oxygen Delivery Method): room air      General: Cooperative, NAD   NECK: supple, no masses  ENT: Normal hearing   Vascular : no carotid bruits,   Lungs: CTAB  Chest: RRR, no murmurs  Extremities: nontender, no edema  Musculoskeletal: no adventitious movements, no joint stiffness  Skin: no rash    Neurological Examination:  NIHSS:3  MS: AOx2. Appropriately interactive, normal affect. Speech fluent, can name and repeat, follows 2 step commands.   CN: L HVFC, PERLL, EOMI, V1-3 sensation intact, face symmetric, hearing intact, palate elevates symmetrically, tongue midline, SCM equal bilaterally  Motor: normal bulk and tone, + kinetic UEs tremor, no rigidity or bradykinesia. No drift of extremities, strength  4+/5 all over   Sens: Intact to light touch, DSS.    Reflexes: 0-1/4 all over, downgoing toes b/l  Coord:  No dysmetria, KAZ intact   Gait: Cannot test    Labs: Reviewed    Basic Metabolic Panel - STAT (10.11.24 @ 10:19)   Sodium: 140 mmol/L  Potassium: 4.0 mmol/L  Chloride: 108 mmol/L  Carbon Dioxide: 29 mmol/L  Anion Gap: 3 mmol/L  Blood Urea Nitrogen: 22 mg/dL  Creatinine: 0.98 mg/dL  Glucose: 126 mg/dL  Calcium: 9.3 mg/dL  eGFR: 54:     Complete Blood Count + Automated Diff (10.10.24 @ 20:41)   WBC Count: 11.89 K/uL  RBC Count: 4.55 M/uL  Hemoglobin: 13.8 g/dL  Hematocrit: 42.0 %  Mean Cell Volume: 92.3 fl  Mean Cell Hemoglobin: 30.3 pg  Mean Cell Hemoglobin Conc: 32.9 gm/dL  Red Cell Distrib Width: 12.9 %  Platelet Count - Automated: 160 K/uL      < from: CT Head No Cont (10.11.24 @ 01:55) >  FINDINGS:    VENTRICLES AND SULCI: Age appropriate involutional changes.  INTRA-AXIAL: Redemonstration of a 5 mm rounded hyperdensity within the   right parietal corona radiata with minimal surrounding edema, suggestive   of a focus of hemorrhage. Acute/subacute right PCA territory infarct.   Chronic left thalamic and bilateral basal ganglia lacunar infarcts. Mild   patchy hypodensities within the periventricular and subcortical white   matter, although nonspecific, likely reflect chronic microvascular   disease.  EXTRA-AXIAL: No mass or fluid collection. Basal cisterns are normal in   appearance.    VISUALIZED SINUSES:  Clear.  TYMPANOMASTOID CAVITIES:  Clear.  VISUALIZED ORBITS: Normal.  CALVARIUM: Right parietal chris holes.    MISCELLANEOUS: None.      IMPRESSION:  Stable examination since 10/10/2024.    < end of copied text >

## 2024-10-11 NOTE — H&P ADULT - HISTORY OF PRESENT ILLNESS
89 y/o female w/PMHx of HTN, HLD, AS s/p TAVR, MR, PPM, Hypothyroidism, GERD, OA/RA (On Prednisone), pacemaker, right burrholes for hematoma evacuation (2015)        PAST MEDICAL & SURGICAL HISTORY:  Intraventricular block      Actinic keratosis      Aortic valve stenosis      Back pain      Hard of hearing      Subdural hematoma      Dry eyes      Fatigue      HLD (hyperlipidemia)      HTN (hypertension)      GERD (gastroesophageal reflux disease)      Syncope and collapse      Dehydration      Hypothyroid      MR (mitral regurgitation)      Nocturia      Urine incontinence      Osteoarthritis of back      H/O brain surgery  subdural  bleed,  had  chris holes      Bilateral cataracts      History of tonsillectomy      History of cholecystectomy      H/O hand surgery      Pacemaker      S/P TAVR (transcatheter aortic valve replacement)          FAMILY HISTORY:  Family history of heart attack (Father)       Noncontributory     Social Hx:  Nonsmoker, no drug or alcohol use    Allergies    No Known Allergies    Intolerances    aspirin (Other)      MEDICATIONS  (STANDING):  cefTRIAXone Injectable. 1000 milliGRAM(s) IV Push Once        89 y/o female w/PMHx of HTN, HLD, AS s/p TAVR, MR, PPM, Hypothyroidism, GERD, OA/RA , pacemaker, right burrholes for hematoma evacuation (2015) presents s/p fall severa ldays ago and increase sleep and lethargy per daughter. Dt rprovides most of history, pt [place] and provides only partial history. Pt fell 2 days ago, unclear if true syncope - dtr felt there was no headstrike as she examined patient and did not feel notice any traumatic physical sequelae from fall. PT could not confirm HS or not. She states she felt fine. No preceding cp or sob. No urinary ssx - UA in ED appears infected. No recent fevers or chills.     ED course: CTH: acute vs subacute cva; 5mm R parietal lobe hemorrhage vs lesion, repeat performed, stable. Evaluated by neurosx, no acute surgical intervention, OK to start A/C if needed.   Made dtr aware.   Start on CFTX in ED  BP uncontrolled, s/p IV labetolol 5mg x 2, metoprolol 25mg po x 1          PAST MEDICAL & SURGICAL HISTORY:  Intraventricular block      Actinic keratosis      Aortic valve stenosis      Back pain      Hard of hearing      Subdural hematoma      Dry eyes      Fatigue      HLD (hyperlipidemia)      HTN (hypertension)      GERD (gastroesophageal reflux disease)      Syncope and collapse      Dehydration      Hypothyroid      MR (mitral regurgitation)      Nocturia      Urine incontinence      Osteoarthritis of back      H/O brain surgery  subdural  bleed,  had  chris holes      Bilateral cataracts      History of tonsillectomy      History of cholecystectomy      H/O hand surgery      Pacemaker      S/P TAVR (transcatheter aortic valve replacement)          FAMILY HISTORY:  Family history of heart attack (Father)       Noncontributory     Social Hx:  Nonsmoker, no drug or alcohol use    Allergies    No Known Allergies    Intolerances    aspirin (Other)      MEDICATIONS  (STANDING):  cefTRIAXone Injectable. 1000 milliGRAM(s) IV Push Once

## 2024-10-11 NOTE — PHYSICAL THERAPY INITIAL EVALUATION ADULT - REFERRAL TO ANOTHER SERVICE NEEDED, PT EVAL
Called pt to convey COVID results     SARS-CoV-2 by PCR Not Detected / Detected / Inhibitor Present Not Detected      No answer, left vm. Awaiting call back.    neurology/social work

## 2024-10-11 NOTE — H&P ADULT - NSHPLABSRESULTS_GEN_ALL_CORE
LABS: All Labs Reviewed:                        13.8   11.89 )-----------( 160      ( 10 Oct 2024 20:41 )             42.0     10-10    135  |  105  |  25[H]  ----------------------------<  110[H]  4.4   |  25  |  1.15    Ca    10.0      10 Oct 2024 20:41    TPro  7.9  /  Alb  3.6  /  TBili  0.4  /  DBili  x   /  AST  24  /  ALT  23  /  AlkPhos  98  10-10    PT/INR - ( 10 Oct 2024 20:41 )   PT: 10.4 sec;   INR: 0.90 ratio         PTT - ( 10 Oct 2024 20:41 )  PTT:20.2 sec      < from: CT Head No Cont (10.11.24 @ 01:55) >      IMPRESSION:  Stable examination since 10/10/2024.    < end of copied text >    < from: CT Head No Cont (10.10.24 @ 21:37) >      IMPRESSION:  Acute/subacute infarct of the medial right occipital/temporal lobe. No   midline shift or herniation. No associated hemorrhage.    A 5 mm rounded hyperdensity in the right parietal white matter with   adjacent minimal edema is new compared with 2022, concerning for small   hemorrhage given history of fall. Possibility of underlying lesion is not   excluded.    Findings were discussed with Dr. EDIS BOUDREAUX [phone] 10/10/2024   10:04 PM by Dr. Villafuerte with read back confirmation.    --- End of Report ---    < end of copied text >              Blood Culture:

## 2024-10-12 LAB
A1C WITH ESTIMATED AVERAGE GLUCOSE RESULT: 5 % — SIGNIFICANT CHANGE UP (ref 4–5.6)
ANION GAP SERPL CALC-SCNC: 5 MMOL/L — SIGNIFICANT CHANGE UP (ref 5–17)
BUN SERPL-MCNC: 23 MG/DL — SIGNIFICANT CHANGE UP (ref 7–23)
CALCIUM SERPL-MCNC: 9.4 MG/DL — SIGNIFICANT CHANGE UP (ref 8.5–10.1)
CHLORIDE SERPL-SCNC: 110 MMOL/L — HIGH (ref 96–108)
CHOLEST SERPL-MCNC: 195 MG/DL — SIGNIFICANT CHANGE UP
CO2 SERPL-SCNC: 25 MMOL/L — SIGNIFICANT CHANGE UP (ref 22–31)
CREAT SERPL-MCNC: 0.94 MG/DL — SIGNIFICANT CHANGE UP (ref 0.5–1.3)
CULTURE RESULTS: SIGNIFICANT CHANGE UP
EGFR: 57 ML/MIN/1.73M2 — LOW
ESTIMATED AVERAGE GLUCOSE: 97 MG/DL — SIGNIFICANT CHANGE UP (ref 68–114)
GLUCOSE SERPL-MCNC: 84 MG/DL — SIGNIFICANT CHANGE UP (ref 70–99)
HCT VFR BLD CALC: 35.4 % — SIGNIFICANT CHANGE UP (ref 34.5–45)
HDLC SERPL-MCNC: 44 MG/DL — LOW
HGB BLD-MCNC: 11.6 G/DL — SIGNIFICANT CHANGE UP (ref 11.5–15.5)
LIPID PNL WITH DIRECT LDL SERPL: 133 MG/DL — HIGH
MCHC RBC-ENTMCNC: 30.1 PG — SIGNIFICANT CHANGE UP (ref 27–34)
MCHC RBC-ENTMCNC: 32.8 GM/DL — SIGNIFICANT CHANGE UP (ref 32–36)
MCV RBC AUTO: 91.9 FL — SIGNIFICANT CHANGE UP (ref 80–100)
NON HDL CHOLESTEROL: 150 MG/DL — HIGH
PLATELET # BLD AUTO: 140 K/UL — LOW (ref 150–400)
POTASSIUM SERPL-MCNC: 3.9 MMOL/L — SIGNIFICANT CHANGE UP (ref 3.5–5.3)
POTASSIUM SERPL-SCNC: 3.9 MMOL/L — SIGNIFICANT CHANGE UP (ref 3.5–5.3)
RBC # BLD: 3.85 M/UL — SIGNIFICANT CHANGE UP (ref 3.8–5.2)
RBC # FLD: 13.2 % — SIGNIFICANT CHANGE UP (ref 10.3–14.5)
SODIUM SERPL-SCNC: 140 MMOL/L — SIGNIFICANT CHANGE UP (ref 135–145)
SPECIMEN SOURCE: SIGNIFICANT CHANGE UP
TRIGL SERPL-MCNC: 94 MG/DL — SIGNIFICANT CHANGE UP
WBC # BLD: 6.41 K/UL — SIGNIFICANT CHANGE UP (ref 3.8–10.5)
WBC # FLD AUTO: 6.41 K/UL — SIGNIFICANT CHANGE UP (ref 3.8–10.5)

## 2024-10-12 PROCEDURE — 99233 SBSQ HOSP IP/OBS HIGH 50: CPT

## 2024-10-12 RX ORDER — HYDRALAZINE HYDROCHLORIDE 100 MG/1
10 TABLET ORAL ONCE
Refills: 0 | Status: COMPLETED | OUTPATIENT
Start: 2024-10-12 | End: 2024-10-12

## 2024-10-12 RX ADMIN — ATORVASTATIN CALCIUM 40 MILLIGRAM(S): 10 TABLET, FILM COATED ORAL at 21:28

## 2024-10-12 RX ADMIN — Medication 81 MILLIGRAM(S): at 08:43

## 2024-10-12 RX ADMIN — Medication 100 MICROGRAM(S): at 06:31

## 2024-10-12 RX ADMIN — Medication 25 MILLIGRAM(S): at 08:43

## 2024-10-12 RX ADMIN — HYDRALAZINE HYDROCHLORIDE 10 MILLIGRAM(S): 100 TABLET ORAL at 06:43

## 2024-10-12 RX ADMIN — Medication 1000 MILLIGRAM(S): at 08:45

## 2024-10-12 RX ADMIN — MULTI VITAMIN/MINERAL SUPPLEMENT WITH ASCORBIC ACID, NIACIN, PYRIDOXINE, PANTOTHENIC ACID, FOLIC ACID, RIBOFLAVIN, THIAMIN, BIOTIN, COBALAMIN AND ZINC. 1 TABLET(S): 60; 20; 12.5; 10; 10; 1.7; 1.5; 1; .3; .006 TABLET, COATED ORAL at 08:43

## 2024-10-12 NOTE — PROGRESS NOTE ADULT - ASSESSMENT
89 y/o female w/PMHx of HTN, HLD, AS s/p TAVR, MR, PPM, Hypothyroidism, GERD, OA/RA , pacemaker, right burrholes for hematoma evacuation (2015) presents s/p fall several days ago and increase sleep and lethargy per daughter. Dt r provides most of history, pt [place] and provides only partial history. Pt fell 2 days ago, unclear if true syncope - dtr felt there was no headstrike as she examined patient and did not feel notice any traumatic physical sequelae from fall. PT could not confirm HS or not. She states she felt fine. No preceding cp or sob. No urinary ssx - UA in ED appears infected. No recent fevers or chills.     ED course: CTH: acute vs subacute cva; 5mm R parietal lobe hemorrhage vs lesion, repeat performed, stable. Evaluated by neurosx, no acute surgical intervention, OK to start A/C if needed.   Made dtr aware.   Start on CFTX in ED  BP uncontrolled, s/p IV labetolol 5mg x 2, metoprolol 25mg po x 1          #Acute vs subacute CVA  #Right sided 5mm parietal lobe hemorrhage   #Fall vs syncope  #UTI, AUR  - admit to tele  - neuro checks q4h  - fall precautions- repeat CTH stable  - OK to start ASA per neurosx  - TTE  - BP control  - PPM interrogation  EEG no seizure  - Trops  - Lipid / A1C  - ASA/ statin  - bedside dysphagia screen  - neuro consult  - CFTX  - urine cultures  - DVT px: SCDs      DNR/I  -d/w dtr  dw neuro , RN team , OT and Dr rivera consult, bladder scan q 6hrs   total time spent 47mins  89 y/o female w/PMHx of HTN, HLD, AS s/p TAVR, MR, PPM, Hypothyroidism, GERD, OA/RA , pacemaker, right burrholes for hematoma evacuation (2015) presents s/p fall several days ago and increase sleep and lethargy per daughter. Dt r provides most of history, pt [place] and provides only partial history. Pt fell 2 days ago, unclear if true syncope - dtr felt there was no headstrike as she examined patient and did not feel notice any traumatic physical sequelae from fall. PT could not confirm HS or not. She states she felt fine. No preceding cp or sob. No urinary ssx - UA in ED appears infected. No recent fevers or chills.     ED course: CTH: acute vs subacute cva; 5mm R parietal lobe hemorrhage vs lesion, repeat performed, stable. Evaluated by neurosx, no acute surgical intervention, OK to start A/C if needed.   Made dtr aware.   Start on CFTX in ED  BP uncontrolled, s/p IV labetolol 5mg x 2, metoprolol 25mg po x 1          #Acute vs subacute CVA  #Right sided 5mm parietal lobe hemorrhage   #Fall vs syncope  #UTI, AUR  - admit to tele  - neuro checks q4h  - fall precautions- repeat CTH stable  - OK to start ASA per neurosx  - TTE  - BP control  - PPM interrogation  EEG no seizure  - Trops  - Lipid / A1C  - ASA/ statin  - bedside dysphagia screen  - neuro consult  - CFTX  - urine cultures  - DVT px: SCDs      DNR/I  -d/w dtr  dw neuro , RN team , OT and Dr rivera consult, bladder scan q 6hrs   total time spent 51 mins

## 2024-10-12 NOTE — EEG REPORT - NS EEG TEXT BOX
CELINA HENAO [facility]-[id number]     Study Date: 		10-12-24      --------------------------------------------------------------------------------------------------  History:  CC/ HPI Patient is a 89y old  Female who presents with a chief complaint of fall vs syncope (12 Oct 2024 09:46)    MEDICATIONS  (STANDING):  aspirin  chewable 81 milliGRAM(s) Oral daily  atorvastatin 40 milliGRAM(s) Oral at bedtime  cefTRIAXone Injectable. 1000 milliGRAM(s) IV Push every 24 hours  levothyroxine 100 MICROGram(s) Oral daily  metoprolol succinate ER 25 milliGRAM(s) Oral daily  multivitamin 1 Tablet(s) Oral daily    --------------------------------------------------------------------------------------------------  Study Interpretation:    [[[Abbreviation Key:  PDR=alpha rhythm/posterior dominant rhythm. A-P=anterior posterior gradient.  Amplitude: ‘very low’:<20; ‘low’:20-50; ‘medium’:; ‘high’:>200uV.  Persistence for periodic/rhythmic patterns (% of epoch) ‘rare’:<1%; ‘occasional’:1-10%; ‘frequent’:10-50%; ‘abundant’:50-90%; ‘continuous’:>90%.  Persistence for sporadic discharges: ‘rare’:<1/hr; ‘occasional’:1/min-1/hr; ‘frequent’:>1/min; ‘abundant’:>1/10 sec.  GRDA=generalized rhythmic delta activity; FIRDA=frontal intermittent GRDA; LRDA=lateralized rhythmic delta activity; TIRDA=temporal intermittent rhythmic delta activity;  LPD=PLED=lateralized periodic discharges; GPD=generalized periodic discharges; BiPDs=BiPLEDs=bilateral independent periodic epileptiform discharges; SIRPID=stimulus induced rhythmic, periodic, or ictal appearing discharges; BIRDs=brief potentially ictal rhythmic discharges >4 Hz, lasting .5-10s; PFA=paroxysmal bursts of beta/gamma; LVFA=low voltage fast activity.  Modifiers: +F=with fast component; +S=with spike component; +R=with rhythmic component.  S-B=burst suppression pattern.  Max=maximal. N1-drowsy; N2-stage II sleep; N3-slow wave sleep. SSS/BETS=small sharp spikes/benign epileptiform transients of sleep. HV=hyperventilation; PS=photic stimulation]]]    FINDINGS:      Background:  Continuity: continuous  Symmetry: symmetric  PDR: 9Hz activity, with amplitude to 30 uV, that attenuated to eye opening.    Reactivity: present  Voltage: normal (between 20-150uV)  Anterior Posterior Gradient: present  Other background findings: none  Breach: absent    Background Slowing:  Generalized slowing: shifting diffuse irregular delta and theta activity.  Focal slowing: none was present.    State Changes:   -Drowsiness noted with increased slowing, attenuation of fast activity, vertex transients.  -N2 sleep transients were not recorded.    Sporadic Epileptiform Discharges:    None    Rhythmic and Periodic Patterns (RPPs):  None     Electrographic and Electroclinical seizures:  None    Other Clinical Events:  None    Activation Procedures:   -Hyperventilation was not performed.    -Photic stimulation was not performed.     Artifacts:  P4 artifacts  Intermittent myogenic and movement artifacts were noted.    ECG:  The heart rate on single channel ECG was predominantly between BPM = 60-66    EEG Classification / Summary:  Abnormal EEG study  Mild generalized / shifting background slowing    -----------------------------------------------------------------------------------------------------    Clinical Impression:  Mild diffuse/multi-focal cerebral dysfunction, not specific as to etiology.  No persistent focal slowing, despite known lesion on right.  There were no epileptiform abnormalities recorded.      -------------------------------------------------------------------------------------------------------  [facility] EEG Reading Room Ph#: [phone]  Epilepsy Answering Service after 5PM and before 8:30AM: Ph#: [phone]    Dm Salinas M.D.   of Neurology, [facility] Epilepsy [place]

## 2024-10-13 DIAGNOSIS — I10 ESSENTIAL (PRIMARY) HYPERTENSION: ICD-10-CM

## 2024-10-13 DIAGNOSIS — Z95.2 PRESENCE OF PROSTHETIC HEART VALVE: ICD-10-CM

## 2024-10-13 LAB
ANION GAP SERPL CALC-SCNC: 5 MMOL/L — SIGNIFICANT CHANGE UP (ref 5–17)
ANION GAP SERPL CALC-SCNC: 5 MMOL/L — SIGNIFICANT CHANGE UP (ref 5–17)
BASOPHILS # BLD AUTO: 0.03 K/UL — SIGNIFICANT CHANGE UP (ref 0–0.2)
BASOPHILS NFR BLD AUTO: 0.3 % — SIGNIFICANT CHANGE UP (ref 0–2)
BUN SERPL-MCNC: 28 MG/DL — HIGH (ref 7–23)
BUN SERPL-MCNC: 29 MG/DL — HIGH (ref 7–23)
CALCIUM SERPL-MCNC: 9.2 MG/DL — SIGNIFICANT CHANGE UP (ref 8.5–10.1)
CALCIUM SERPL-MCNC: 9.4 MG/DL — SIGNIFICANT CHANGE UP (ref 8.5–10.1)
CHLORIDE SERPL-SCNC: 109 MMOL/L — HIGH (ref 96–108)
CHLORIDE SERPL-SCNC: 110 MMOL/L — HIGH (ref 96–108)
CO2 SERPL-SCNC: 24 MMOL/L — SIGNIFICANT CHANGE UP (ref 22–31)
CO2 SERPL-SCNC: 24 MMOL/L — SIGNIFICANT CHANGE UP (ref 22–31)
CREAT SERPL-MCNC: 1.08 MG/DL — SIGNIFICANT CHANGE UP (ref 0.5–1.3)
CREAT SERPL-MCNC: 1.16 MG/DL — SIGNIFICANT CHANGE UP (ref 0.5–1.3)
EGFR: 44 ML/MIN/1.73M2 — LOW
EGFR: 48 ML/MIN/1.73M2 — LOW
EOSINOPHIL # BLD AUTO: 0.35 K/UL — SIGNIFICANT CHANGE UP (ref 0–0.5)
EOSINOPHIL NFR BLD AUTO: 4 % — SIGNIFICANT CHANGE UP (ref 0–6)
GLUCOSE BLDC GLUCOMTR-MCNC: 114 MG/DL — HIGH (ref 70–99)
GLUCOSE SERPL-MCNC: 88 MG/DL — SIGNIFICANT CHANGE UP (ref 70–99)
GLUCOSE SERPL-MCNC: 98 MG/DL — SIGNIFICANT CHANGE UP (ref 70–99)
HCT VFR BLD CALC: 36.6 % — SIGNIFICANT CHANGE UP (ref 34.5–45)
HGB BLD-MCNC: 12.2 G/DL — SIGNIFICANT CHANGE UP (ref 11.5–15.5)
IMM GRANULOCYTES NFR BLD AUTO: 0.8 % — SIGNIFICANT CHANGE UP (ref 0–0.9)
LYMPHOCYTES # BLD AUTO: 0.77 K/UL — LOW (ref 1–3.3)
LYMPHOCYTES # BLD AUTO: 8.9 % — LOW (ref 13–44)
MAGNESIUM SERPL-MCNC: 2.1 MG/DL — SIGNIFICANT CHANGE UP (ref 1.6–2.6)
MCHC RBC-ENTMCNC: 31 PG — SIGNIFICANT CHANGE UP (ref 27–34)
MCHC RBC-ENTMCNC: 33.3 GM/DL — SIGNIFICANT CHANGE UP (ref 32–36)
MCV RBC AUTO: 92.9 FL — SIGNIFICANT CHANGE UP (ref 80–100)
MONOCYTES # BLD AUTO: 0.7 K/UL — SIGNIFICANT CHANGE UP (ref 0–0.9)
MONOCYTES NFR BLD AUTO: 8.1 % — SIGNIFICANT CHANGE UP (ref 2–14)
NEUTROPHILS # BLD AUTO: 6.74 K/UL — SIGNIFICANT CHANGE UP (ref 1.8–7.4)
NEUTROPHILS NFR BLD AUTO: 77.9 % — HIGH (ref 43–77)
PLATELET # BLD AUTO: 154 K/UL — SIGNIFICANT CHANGE UP (ref 150–400)
POTASSIUM SERPL-MCNC: 4 MMOL/L — SIGNIFICANT CHANGE UP (ref 3.5–5.3)
POTASSIUM SERPL-MCNC: 4.4 MMOL/L — SIGNIFICANT CHANGE UP (ref 3.5–5.3)
POTASSIUM SERPL-SCNC: 4 MMOL/L — SIGNIFICANT CHANGE UP (ref 3.5–5.3)
POTASSIUM SERPL-SCNC: 4.4 MMOL/L — SIGNIFICANT CHANGE UP (ref 3.5–5.3)
RBC # BLD: 3.94 M/UL — SIGNIFICANT CHANGE UP (ref 3.8–5.2)
RBC # FLD: 13.2 % — SIGNIFICANT CHANGE UP (ref 10.3–14.5)
SODIUM SERPL-SCNC: 138 MMOL/L — SIGNIFICANT CHANGE UP (ref 135–145)
SODIUM SERPL-SCNC: 139 MMOL/L — SIGNIFICANT CHANGE UP (ref 135–145)
WBC # BLD: 8.66 K/UL — SIGNIFICANT CHANGE UP (ref 3.8–10.5)
WBC # FLD AUTO: 8.66 K/UL — SIGNIFICANT CHANGE UP (ref 3.8–10.5)

## 2024-10-13 PROCEDURE — 99223 1ST HOSP IP/OBS HIGH 75: CPT

## 2024-10-13 PROCEDURE — 99233 SBSQ HOSP IP/OBS HIGH 50: CPT

## 2024-10-13 PROCEDURE — 70450 CT HEAD/BRAIN W/O DYE: CPT | Mod: 26

## 2024-10-13 PROCEDURE — 99497 ADVNCD CARE PLAN 30 MIN: CPT

## 2024-10-13 RX ORDER — SODIUM CHLORIDE 0.9 % (FLUSH) 0.9 %
1000 SYRINGE (ML) INJECTION
Refills: 0 | Status: DISCONTINUED | OUTPATIENT
Start: 2024-10-13 | End: 2024-10-16

## 2024-10-13 RX ADMIN — Medication 25 MILLIGRAM(S): at 10:41

## 2024-10-13 RX ADMIN — Medication 75 MILLILITER(S): at 17:00

## 2024-10-13 RX ADMIN — Medication 81 MILLIGRAM(S): at 10:41

## 2024-10-13 RX ADMIN — MULTI VITAMIN/MINERAL SUPPLEMENT WITH ASCORBIC ACID, NIACIN, PYRIDOXINE, PANTOTHENIC ACID, FOLIC ACID, RIBOFLAVIN, THIAMIN, BIOTIN, COBALAMIN AND ZINC. 1 TABLET(S): 60; 20; 12.5; 10; 10; 1.7; 1.5; 1; .3; .006 TABLET, COATED ORAL at 10:41

## 2024-10-13 RX ADMIN — Medication 100 MICROGRAM(S): at 05:35

## 2024-10-13 RX ADMIN — ATORVASTATIN CALCIUM 40 MILLIGRAM(S): 10 TABLET, FILM COATED ORAL at 22:52

## 2024-10-13 NOTE — CONSULT NOTE ADULT - PROBLEM SELECTOR RECOMMENDATION 9
Acute / subacute CVA; no AF seen on telemetry; unclear if MINNIE findings will   -- less invasive management may be more appropriate in this elderly patient. Can pursue ambulatory ECG monitoring to screen for AF since PPM may not detect all AF episodes. Continue aspirin, atorvastatin.

## 2024-10-13 NOTE — PROGRESS NOTE ADULT - ASSESSMENT
89 year old woman with dementia, HTN, HLD, AS s/p TAVR, PPM, hypothyroidism, prior SDH, admitted 10/10 for fall and weakness, discovered on CTH to have a subacute R PCA stroke.  On exam this AM, she does have a visual field deficit on the L, and is notably disoriented to month, and year.  CTH with R occipital infarct as noted, also small focus of hyperdensity in the white matter of the R parietal cortex.   [place] stroke of unknown source at this time.  She does have an enlarged L atrium.  The PPM interrogation did not detect any episodes of afib, but would not be considered conclusive per cardiology.     -continue ASA 81mg daily  -continue high intensity statin  -would consider a loop recorder for detection of atrial fibrillation.  Should be discussed with family along with the overall goals of care.   -PT/OT/SLP  -neurology to follow.  Please page or call with any acute neuro changes, or other issues we can help address.   89 year old woman with dementia, HTN, HLD, AS s/p TAVR, PPM, hypothyroidism, prior SDH, admitted 10/10 for fall and weakness, discovered on CTH to have a subacute R PCA stroke.  On exam this AM, she does have a visual field deficit on the L, and is notably disoriented to month, and year.  CTH with R occipital infarct as noted, also small focus of hyperdensity in the white matter of the R parietal cortex.   [place] stroke of unknown source at this time.  She does have an enlarged L atrium.  The PPM interrogation did not detect any episodes of afib, but would not be considered conclusive per cardiology.     -continue ASA 81mg daily  -continue high intensity statin  -would consider a loop recorder for detection of atrial fibrillation.  Should be discussed with family along with the overall goals of care.   -CTA H&N when able  -PT/OT/SLP  -neurology to follow.  Please page or call with any acute neuro changes, or other issues we can help address.      Discussed with Dr. Ramirez.

## 2024-10-13 NOTE — RAPID RESPONSE TEAM SUMMARY - NSOTHERINTERVENTIONSRRT_GEN_ALL_CORE
Moved patient back to bed, placed in trendelenberg.   Started rapid 1L LR  bolus with pressure bag.   Patient woke up as fluids were started. Pupillary changes resolved, likely present iso cerebral hypoperfusion from hypotension.   Spoke with family in hallway, will keep in place DNR / DNI, would be agreeable to pressors in future if hypotension recurs.  Recommend repeat head scans once BP remains sky after finishing bolus. Neurology already following and aware.

## 2024-10-13 NOTE — CONSULT NOTE ADULT - SUBJECTIVE AND OBJECTIVE BOX
CHIEF COMPLAINT: "I'm very tired."    HPI:  89 y/o year old woman with a history of severe aortic stenosis s/p TAVR (2018), single-vessel CAD (LAD), CHB s/p PPM, Chronic diastolic CHF, HTN, HLD, hypothyroidism, GERD, OA/RA, who presented to the ED on 10/10/24 for the evaluation of generalized weakness and recent fall.  She was found to have an acute/subacute infarct of the medial right occipital/temporal lobe and small hyperdensity in the right parietal white matter with adjacent minimal edema concerning for small hemorrhage; cardiology consult called because of possible thromboembolic event.    Mrs. Parry has no cardiac complaints -- not experiencing SOB, palpitations, or chest discomfort; says she is very fatigued.    PAST MEDICAL & SURGICAL HISTORY:  Intraventricular block  Actinic keratosis  Aortic valve stenosis  Back pain  Hard of hearing  Subdural hematoma  Dry eyes  Fatigue  HLD (hyperlipidemia)  HTN (hypertension)  GERD (gastroesophageal reflux disease)  Syncope and collapse  Dehydration  Hypothyroid  MR (mitral regurgitation)  Nocturia  Urine incontinence  Osteoarthritis of back  H/O brain surgery, subdural  bleed,  had  chris holes  Bilateral cataracts  History of tonsillectomy  History of cholecystectomy  H/O hand surgery  Pacemaker  S/P TAVR (transcatheter aortic valve replacement)    FAMILY HISTORY:  heart attack (Father)     SOCIAL HISTORY:   Alcohol: Denied  Smoking: Nonsmoker  Drug Use: Denied  Marital Status:     MEDICATIONS  (STANDING):  aspirin  chewable 81 milliGRAM(s) Oral daily  atorvastatin 40 milliGRAM(s) Oral at bedtime  cefTRIAXone Injectable. 1000 milliGRAM(s) IV Push every 24 hours  levothyroxine 100 MICROGram(s) Oral daily  metoprolol succinate ER 25 milliGRAM(s) Oral daily  multivitamin 1 Tablet(s) Oral daily    MEDICATIONS  (PRN):  acetaminophen     Tablet .. 650 milliGRAM(s) Oral every 6 hours PRN Temp greater or equal to 38C (100.4F), Mild Pain (1 - 3)  aluminum hydroxide/magnesium hydroxide/simethicone Suspension 30 milliLiter(s) Oral every 4 hours PRN Dyspepsia  melatonin 3 milliGRAM(s) Oral at bedtime PRN Insomnia  ondansetron Injectable 4 milliGRAM(s) IV Push every 8 hours PRN Nausea and/or Vomiting    Allergies: No Known Allergies    REVIEW OF SYSTEMS:  CONSTITUTIONAL: + Fatigue, fevers or chills  Eyes: No visual changes  NECK: No pain  RESPIRATORY: No shortness of breath  CARDIOVASCULAR: No chest pain or palpitations  GASTROINTESTINAL: No abdominal pain.   GENITOURINARY: No dysuria  NEUROLOGICAL: No numbness.  SKIN: No itching or rash  All other review of systems is negative unless indicated above    VITAL SIGNS:   Vital Signs Last 24 Hrs  T(C): 36 (13 Oct 2024 08:55), Max: 36.7 (12 Oct 2024 15:59)  T(F): 96.8 (13 Oct 2024 08:55), Max: 98 (12 Oct 2024 15:59)  HR: 68 (13 Oct 2024 08:55) (65 - 79)  BP: 161/67 (13 Oct 2024 08:55) (121/58 - 173/67)  RR: 18 (13 Oct 2024 08:55) (18 - 18)  SpO2: 98% (13 Oct 2024 08:55) (95% - 98%)  Patient On (Oxygen Delivery Method): room air    PHYSICAL EXAM:  Constitutional: NAD, awake and alert, appears stated age  HEENT:  EOMI,  Pupils round, No oral cyanosis. Poor dentition  Pulmonary: Non-labored, breath sounds are clear bilaterally  Cardiovascular: S1 and S2, regular, murmur  Gastrointestinal: Bowel Sounds present, soft, nontender.   Lymph: No cervical lymphadenopathy.  Neurological: Alert, moves all extremities  Skin: No rashes.  Psych:  Mood & affect appropriate    LABS:             11.6   6.41  )-----------( 140      ( 12 Oct 2024 06:20 )             35.4           139    |  110    |  28     ----------------------------<  88     4.0     |  24     |  1.08     Ca    9.4        13 Oct 2024 06:32    TPro  7.9    /  Alb  3.6    /  TBili  0.4    /  DBili  x      /  AST  24     /  ALT  23     /  AlkPhos  98     10 Oct 2024 20:41    Tele: SR    TTE W or WO Ultrasound Enhancing Agent (10.11.24 @ 11:40) >   1. Left ventricular systolic function is normal with an ejection fraction of 61 % by 3D.   2. There is moderate (grade 2) left ventricular diastolic dysfunction.   3. Left atrium is severely dilated.   4. S/p TAVR - well seated.   5. No pericardial effusion seen.   6. Mild to moderate tricuspid regurgitation.   7. Estimated pulmonary artery systolic pressure is 37 mmHg.   8. Moderate mitral valve stenosis.

## 2024-10-13 NOTE — PROGRESS NOTE ADULT - ASSESSMENT
89 y/o female w/PMHx of HTN, HLD, AS s/p TAVR, MR, PPM, Hypothyroidism, GERD, OA/RA , pacemaker, right burrholes for hematoma evacuation (2015) presents s/p fall several days ago and increase sleep and lethargy per daughter. Dt r provides most of history, pt [place] and provides only partial history. Pt fell 2 days ago, unclear if true syncope - dtr felt there was no headstrike as she examined patient and did not feel notice any traumatic physical sequelae from fall. PT could not confirm HS or not. She states she felt fine. No preceding cp or sob. No urinary ssx - UA in ED appears infected. No recent fevers or chills.     ED course: CTH: acute vs subacute cva; 5mm R parietal lobe hemorrhage vs lesion, repeat performed, stable. Evaluated by neurosx, no acute surgical intervention, OK to start A/C if needed.   Made dtr aware.   Start on CFTX in ED  BP uncontrolled, s/p IV labetolol 5mg x 2, metoprolol 25mg po x 1      #Acute CVA-   right occipital infarct and small right parietal hyperdensity concerning for hemorrhage - embolic stroke cannot be ruled out  #Right sided 5mm parietal lobe hemorrhage   #Fall vs syncope  #UTI, AUR-  tele  ; no AF seen on telemetry  unclear if MINNIE findings will   -- less invasive management may be more appropriate in this elderly patient.   .would need loop recorder  since PPM may not detect all AF episodes  Repeat CT head showing stable exam. No indication for keppra   The PPM interrogation did not detect any episodes of afib  continue ASA 81mg daily  -continue high intensity statin  - TTE left atrial enlargement  EEG no seizure  - Lipid / A1C 5    # S/P rapid response 10/13   syncope likely from hypotension/hypoperfusion  afebrile   now more awake, s/p IVF  stat CT head   dw intensivist, neuro cardio    # UTI  on ceftriaxone       - DVT px: SCDs      DNR/I  -d/w dtr  dw neuro , RN team , OT and Dr rivera consult, bladder scan q 6hrs   total time spent 75

## 2024-10-13 NOTE — RAPID RESPONSE TEAM SUMMARY - NSSITUATIONBACKGROUNDRRT_GEN_ALL_CORE
89 y/o year old woman with a history of severe aortic stenosis s/p TAVR (2018), single-vessel CAD (LAD), CHB s/p PPM, Chronic diastolic CHF, HTN, HLD, hypothyroidism, GERD, OA/RA, who presented to the ED on 10/10/24 for the evaluation of generalized weakness and recent fall.  She was found to have an acute/subacute infarct of the medial right occipital/temporal lobe and small hyperdensity in the right parietal white matter with adjacent minimal edema concerning for small hemorrhage, likely embolic stroke of unknown source. Course also complicated by UTI.    RRT called for unresponsiveness while sitting in the chair. SBPs 50s, Sinus Bradycardic HR 50s. R pupil newly fixed, non-reactive to light.

## 2024-10-14 LAB
ANION GAP SERPL CALC-SCNC: 5 MMOL/L — SIGNIFICANT CHANGE UP (ref 5–17)
BASOPHILS # BLD AUTO: 0.04 K/UL — SIGNIFICANT CHANGE UP (ref 0–0.2)
BASOPHILS NFR BLD AUTO: 0.5 % — SIGNIFICANT CHANGE UP (ref 0–2)
BUN SERPL-MCNC: 27 MG/DL — HIGH (ref 7–23)
CALCIUM SERPL-MCNC: 9.7 MG/DL — SIGNIFICANT CHANGE UP (ref 8.5–10.1)
CHLORIDE SERPL-SCNC: 109 MMOL/L — HIGH (ref 96–108)
CO2 SERPL-SCNC: 24 MMOL/L — SIGNIFICANT CHANGE UP (ref 22–31)
CREAT SERPL-MCNC: 0.86 MG/DL — SIGNIFICANT CHANGE UP (ref 0.5–1.3)
EGFR: 63 ML/MIN/1.73M2 — SIGNIFICANT CHANGE UP
EOSINOPHIL # BLD AUTO: 0.42 K/UL — SIGNIFICANT CHANGE UP (ref 0–0.5)
EOSINOPHIL NFR BLD AUTO: 5.7 % — SIGNIFICANT CHANGE UP (ref 0–6)
GLUCOSE SERPL-MCNC: 88 MG/DL — SIGNIFICANT CHANGE UP (ref 70–99)
HCT VFR BLD CALC: 37.6 % — SIGNIFICANT CHANGE UP (ref 34.5–45)
HGB BLD-MCNC: 12.6 G/DL — SIGNIFICANT CHANGE UP (ref 11.5–15.5)
IMM GRANULOCYTES NFR BLD AUTO: 0.5 % — SIGNIFICANT CHANGE UP (ref 0–0.9)
LYMPHOCYTES # BLD AUTO: 1.01 K/UL — SIGNIFICANT CHANGE UP (ref 1–3.3)
LYMPHOCYTES # BLD AUTO: 13.7 % — SIGNIFICANT CHANGE UP (ref 13–44)
MCHC RBC-ENTMCNC: 30.7 PG — SIGNIFICANT CHANGE UP (ref 27–34)
MCHC RBC-ENTMCNC: 33.5 GM/DL — SIGNIFICANT CHANGE UP (ref 32–36)
MCV RBC AUTO: 91.5 FL — SIGNIFICANT CHANGE UP (ref 80–100)
MONOCYTES # BLD AUTO: 0.58 K/UL — SIGNIFICANT CHANGE UP (ref 0–0.9)
MONOCYTES NFR BLD AUTO: 7.9 % — SIGNIFICANT CHANGE UP (ref 2–14)
NEUTROPHILS # BLD AUTO: 5.26 K/UL — SIGNIFICANT CHANGE UP (ref 1.8–7.4)
NEUTROPHILS NFR BLD AUTO: 71.7 % — SIGNIFICANT CHANGE UP (ref 43–77)
PLATELET # BLD AUTO: 166 K/UL — SIGNIFICANT CHANGE UP (ref 150–400)
POTASSIUM SERPL-MCNC: 4.2 MMOL/L — SIGNIFICANT CHANGE UP (ref 3.5–5.3)
POTASSIUM SERPL-SCNC: 4.2 MMOL/L — SIGNIFICANT CHANGE UP (ref 3.5–5.3)
RBC # BLD: 4.11 M/UL — SIGNIFICANT CHANGE UP (ref 3.8–5.2)
RBC # FLD: 12.9 % — SIGNIFICANT CHANGE UP (ref 10.3–14.5)
SODIUM SERPL-SCNC: 138 MMOL/L — SIGNIFICANT CHANGE UP (ref 135–145)
WBC # BLD: 7.35 K/UL — SIGNIFICANT CHANGE UP (ref 3.8–10.5)
WBC # FLD AUTO: 7.35 K/UL — SIGNIFICANT CHANGE UP (ref 3.8–10.5)

## 2024-10-14 PROCEDURE — 99221 1ST HOSP IP/OBS SF/LOW 40: CPT

## 2024-10-14 PROCEDURE — 99233 SBSQ HOSP IP/OBS HIGH 50: CPT

## 2024-10-14 PROCEDURE — 93010 ELECTROCARDIOGRAM REPORT: CPT

## 2024-10-14 PROCEDURE — 99233 SBSQ HOSP IP/OBS HIGH 50: CPT | Mod: FS

## 2024-10-14 PROCEDURE — 93880 EXTRACRANIAL BILAT STUDY: CPT | Mod: 26

## 2024-10-14 RX ORDER — HYDRALAZINE HYDROCHLORIDE 100 MG/1
5 TABLET ORAL ONCE
Refills: 0 | Status: DISCONTINUED | OUTPATIENT
Start: 2024-10-14 | End: 2024-10-14

## 2024-10-14 RX ORDER — METOPROLOL TARTRATE 50 MG
12.5 TABLET ORAL ONCE
Refills: 0 | Status: COMPLETED | OUTPATIENT
Start: 2024-10-14 | End: 2024-10-14

## 2024-10-14 RX ADMIN — ATORVASTATIN CALCIUM 40 MILLIGRAM(S): 10 TABLET, FILM COATED ORAL at 21:28

## 2024-10-14 RX ADMIN — Medication 81 MILLIGRAM(S): at 09:46

## 2024-10-14 RX ADMIN — Medication 12.5 MILLIGRAM(S): at 02:56

## 2024-10-14 RX ADMIN — Medication 100 MICROGRAM(S): at 05:41

## 2024-10-14 RX ADMIN — Medication 25 MILLIGRAM(S): at 09:46

## 2024-10-14 RX ADMIN — MULTI VITAMIN/MINERAL SUPPLEMENT WITH ASCORBIC ACID, NIACIN, PYRIDOXINE, PANTOTHENIC ACID, FOLIC ACID, RIBOFLAVIN, THIAMIN, BIOTIN, COBALAMIN AND ZINC. 1 TABLET(S): 60; 20; 12.5; 10; 10; 1.7; 1.5; 1; .3; .006 TABLET, COATED ORAL at 09:46

## 2024-10-14 NOTE — CONSULT NOTE ADULT - SUBJECTIVE AND OBJECTIVE BOX
89yF was admitted on 10-10    Patient is a 89y old  Female who presents with a chief complaint of fall vs syncope (13 Oct 2024 14:00)    HPI:  89 y/o female w/PMHx of HTN, HLD, AS s/p TAVR, MR, PPM, Hypothyroidism, GERD, OA/RA , pacemaker, right burrholes for hematoma evacuation (2015) presents s/p fall severa ldays ago and increase sleep and lethargy per daughter. Dt rprovides most of history, pt [place] and provides only partial history. Pt fell 2 days ago, unclear if true syncope - dtr felt there was no headstrike as she examined patient and did not feel notice any traumatic physical sequelae from fall. PT could not confirm HS or not. She states she felt fine. No preceding cp or sob. No urinary ssx - UA in ED appears infected. No recent fevers or chills.     ED course: CTH: acute vs subacute cva; 5mm R parietal lobe hemorrhage vs lesion, repeat performed, stable. Evaluated by neurosx, no acute surgical intervention, OK to start A/C if needed.   Made dtr aware.   Start on CFTX in ED  BP uncontrolled, s/p IV labetolol 5mg x 2, metoprolol 25mg po x 1    PAST MEDICAL & SURGICAL HISTORY:  Intraventricular block    Actinic keratosis    Aortic valve stenosis    Back pain    Hard of hearing    Subdural hematoma    Dry eyes    Fatigue    HLD (hyperlipidemia)    HTN (hypertension)    GERD (gastroesophageal reflux disease)    Syncope and collapse    Dehydration    Hypothyroid    MR (mitral regurgitation)    Nocturia    Urine incontinence    Osteoarthritis of back    H/O brain surgery  subdural  bleed,  had  chris holes    Bilateral cataracts    History of tonsillectomy    History of cholecystectomy    H/O hand surgery    Pacemaker    S/P TAVR (transcatheter aortic valve replacement)    FAMILY HISTORY:  Family history of heart attack (Father)    Noncontributory     Social Hx:  Nonsmoker, no drug or alcohol use    Allergies    No Known Allergies    Intolerances    aspirin (Other)      MEDICATIONS  (STANDING):  cefTRIAXone Injectable. 1000 milliGRAM(s) IV Push Once     Imaging performed:  Head CT 10/10/2024  . Evolving subacute right PCA infarct, with mild petechial hemorrhage/laminar necrosis.  2. Hemorrhagic lesion in the right corona radiata.  3. Gadolinium MRI recommended.    REVIEW OF SYSTEMS  Constitutional - No fever, No weight loss, No fatigue  HEENT - No eye pain, No visual disturbances, No difficulty hearing, No tinnitus, No vertigo, No neck pain  Respiratory - No cough, No wheezing, No shortness of breath  Cardiovascular - No chest pain, No palpitations  Gastrointestinal - No abdominal pain, No nausea, No vomiting, No diarrhea, No constipation  Genitourinary - No dysuria, No frequency, No hematuria, No incontinence  Neurological - No headaches, No memory loss, No loss of strength, No numbness, No tremors  Skin - No itching, No rashes, No lesions   Endocrine - No temperature intolerance  Musculoskeletal - No joint pain, No joint swelling, No muscle pain  Psychiatric - No depression, No anxiety    VITALS  T(C): 36.6 (10-14-24 @ 09:31), Max: 36.8 (10-14-24 @ 04:57)  HR: 63 (10-14-24 @ 09:31) (63 - 74)  BP: 148/71 (10-14-24 @ 09:31) (104/63 - 202/93)  RR: 18 (10-14-24 @ 09:31) (18 - 18)  SpO2: 99% (10-14-24 @ 09:31) (99% - 100%)  Wt(kg): --    PAST MEDICAL & SURGICAL HISTORY  Intraventricular block    Actinic keratosis    Aortic valve stenosis    Back pain    Hard of hearing    Subdural hematoma    Dry eyes    Fatigue    HLD (hyperlipidemia)    HTN (hypertension)    GERD (gastroesophageal reflux disease)    Syncope and collapse    Dehydration    Hypothyroid    MR (mitral regurgitation)    Nocturia    Urine incontinence    Osteoarthritis of back    H/O brain surgery    Bilateral cataracts    History of tonsillectomy    History of cholecystectomy    H/O hand surgery    Pacemaker    S/P TAVR (transcatheter aortic valve replacement)        SOCIAL HISTORY - as per documentation/history  Smoking - None  EtOH - None  Drugs - None    FUNCTIONAL HISTORY  Lives   Independent    CURRENT FUNCTIONAL STATUS      FAMILY HISTORY   Family history of heart attack (Father)        RECENT LABS - Reviewed  CBC Full  -  ( 14 Oct 2024 07:39 )  WBC Count : 7.35 K/uL  RBC Count : 4.11 M/uL  Hemoglobin : 12.6 g/dL  Hematocrit : 37.6 %  Platelet Count - Automated : 166 K/uL  Mean Cell Volume : 91.5 fl  Mean Cell Hemoglobin : 30.7 pg  Mean Cell Hemoglobin Concentration : 33.5 gm/dL  Auto Neutrophil # : 5.26 K/uL  Auto Lymphocyte # : 1.01 K/uL  Auto Monocyte # : 0.58 K/uL  Auto Eosinophil # : 0.42 K/uL  Auto Basophil # : 0.04 K/uL  Auto Neutrophil % : 71.7 %  Auto Lymphocyte % : 13.7 %  Auto Monocyte % : 7.9 %  Auto Eosinophil % : 5.7 %  Auto Basophil % : 0.5 %    10-14    138  |  109[H]  |  27[H]  ----------------------------<  88  4.2   |  24  |  0.86    Ca    9.7      14 Oct 2024 07:39  Mg     2.1     10-13      Urinalysis Basic - ( 14 Oct 2024 07:39 )    Color: x / Appearance: x / SG: x / pH: x  Gluc: 88 mg/dL / Ketone: x  / Bili: x / Urobili: x   Blood: x / Protein: x / Nitrite: x   Leuk Esterase: x / RBC: x / WBC x   Sq Epi: x / Non Sq Epi: x / Bacteria: x        ALLERGIES  No Known Allergies  aspirin (Other)      MEDICATIONS   acetaminophen     Tablet .. 650 milliGRAM(s) Oral every 6 hours PRN  aluminum hydroxide/magnesium hydroxide/simethicone Suspension 30 milliLiter(s) Oral every 4 hours PRN  aspirin  chewable 81 milliGRAM(s) Oral daily  atorvastatin 40 milliGRAM(s) Oral at bedtime  levothyroxine 100 MICROGram(s) Oral daily  melatonin 3 milliGRAM(s) Oral at bedtime PRN  metoprolol succinate ER 25 milliGRAM(s) Oral daily  multivitamin 1 Tablet(s) Oral daily  ondansetron Injectable 4 milliGRAM(s) IV Push every 8 hours PRN  sodium chloride 0.9%. 1000 milliLiter(s) IV Continuous <Continuous>  sodium chloride 0.9%. 1000 milliLiter(s) IV Continuous <Continuous>      ----------------------------------------------------------------------------------------  PHYSICAL EXAM  Constitutional - NAD, Comfortable  HEENT - NCAT, EOMI  Neck - Supple, No limited ROM  Chest - Breathing comfortably, No wheezing  Cardiovascular - S1S2   Abdomen - Soft   Extremities - No C/C/E, No calf tenderness   Neurologic Exam -                    Cognitive - AAO to self, place, date, year, situation     Communication - Fluent, No dysarthria     Cranial Nerves - CN 2-12 intact     Motor - No focal deficits                    LEFT    UE - ShAB 5/5, EF 5/5, EE 5/5, WE 5/5,  5/5                    RIGHT UE - ShAB 5/5, EF 5/5, EE 5/5, WE 5/5,  5/5                    LEFT    LE - HF 5/5, KE 5/5, DF 5/5, PF 5/5                    RIGHT LE - HF 5/5, KE 5/5, DF 5/5, PF 5/5        Sensory - Intact to LT     Reflexes - DTR Intact, No primitive reflexive     Coordination - FTN intact     OculoVestibular - No saccades, No nystagmus, VOR         Balance - WNL Static  Psychiatric - Mood stable, Affect WNL  ----------------------------------------------------------------------------------------   89yF was admitted on 10-10    Patient is a 89y old  Female who presents with a chief complaint of fall vs syncope (13 Oct 2024 14:00)    HPI:  89 y/o female w/PMHx of HTN, HLD, AS s/p TAVR, MR, PPM, Hypothyroidism, GERD, OA/RA , pacemaker, right burrholes for hematoma evacuation (2015) presents s/p fall severa ldays ago and increase sleep and lethargy per daughter. Dt rprovides most of history, pt [place] and provides only partial history. Pt fell 2 days ago, unclear if true syncope - dtr felt there was no headstrike as she examined patient and did not feel notice any traumatic physical sequelae from fall. PT could not confirm HS or not. She states she felt fine. No preceding cp or sob. No urinary ssx - UA in ED appears infected. No recent fevers or chills.     ED course: CTH: acute vs subacute cva; 5mm R parietal lobe hemorrhage vs lesion, repeat performed, stable. Evaluated by neurosx, no acute surgical intervention, OK to start A/C if needed.   Made dtr aware.   Start on CFTX in ED  BP uncontrolled, s/p IV labetolol 5mg x 2, metoprolol 25mg po x 1    PAST MEDICAL & SURGICAL HISTORY:  Intraventricular block    Actinic keratosis    Aortic valve stenosis    Back pain    Hard of hearing    Subdural hematoma    Dry eyes    Fatigue    HLD (hyperlipidemia)    HTN (hypertension)    GERD (gastroesophageal reflux disease)    Syncope and collapse    Dehydration    Hypothyroid    MR (mitral regurgitation)    Nocturia    Urine incontinence    Osteoarthritis of back    H/O brain surgery  subdural  bleed,  had  chris holes    Bilateral cataracts    History of tonsillectomy    History of cholecystectomy    H/O hand surgery    Pacemaker    S/P TAVR (transcatheter aortic valve replacement)    FAMILY HISTORY:  Family history of heart attack (Father)    Noncontributory     Social Hx:  Nonsmoker, no drug or alcohol use    Allergies    No Known Allergies    Intolerances    aspirin (Other)      MEDICATIONS  (STANDING):  cefTRIAXone Injectable. 1000 milliGRAM(s) IV Push Once     Imaging performed:  Head CT 10/10/2024  . Evolving subacute right PCA infarct, with mild petechial hemorrhage/laminar necrosis.  2. Hemorrhagic lesion in the right corona radiata.  3. Gadolinium MRI recommended.    REVIEW OF SYSTEMS  Constitutional - No fever, No weight loss, No fatigue  HEENT - No eye pain, No visual disturbances, No difficulty hearing, No tinnitus, No vertigo, No neck pain  Respiratory - No cough, No wheezing, No shortness of breath  Cardiovascular - No chest pain, No palpitations  Gastrointestinal - No abdominal pain, No nausea, No vomiting, No diarrhea, No constipation  Genitourinary - No dysuria, No frequency, No hematuria, No incontinence  Neurological - No headaches, No memory loss, No loss of strength, No numbness, No tremors  Skin - No itching, No rashes, No lesions   Endocrine - No temperature intolerance  Musculoskeletal - No joint pain, No joint swelling, No muscle pain  Psychiatric - No depression, No anxiety    VITALS  T(C): 36.6 (10-14-24 @ 09:31), Max: 36.8 (10-14-24 @ 04:57)  HR: 63 (10-14-24 @ 09:31) (63 - 74)  BP: 148/71 (10-14-24 @ 09:31) (104/63 - 202/93)  RR: 18 (10-14-24 @ 09:31) (18 - 18)  SpO2: 99% (10-14-24 @ 09:31) (99% - 100%)  Wt(kg): --    PAST MEDICAL & SURGICAL HISTORY  Intraventricular block    Actinic keratosis    Aortic valve stenosis    Back pain    Hard of hearing    Subdural hematoma    Dry eyes    Fatigue    HLD (hyperlipidemia)    HTN (hypertension)    GERD (gastroesophageal reflux disease)    Syncope and collapse    Dehydration    Hypothyroid    MR (mitral regurgitation)    Nocturia    Urine incontinence    Osteoarthritis of back    H/O brain surgery    Bilateral cataracts    History of tonsillectomy    History of cholecystectomy    H/O hand surgery    Pacemaker    S/P TAVR (transcatheter aortic valve replacement)        SOCIAL HISTORY - as per documentation/history  Smoking - None  EtOH - None  Drugs - None    FUNCTIONAL HISTORY  Lives   Independent    CURRENT FUNCTIONAL STATUS      FAMILY HISTORY   Family history of heart attack (Father)        RECENT LABS - Reviewed  CBC Full  -  ( 14 Oct 2024 07:39 )  WBC Count : 7.35 K/uL  RBC Count : 4.11 M/uL  Hemoglobin : 12.6 g/dL  Hematocrit : 37.6 %  Platelet Count - Automated : 166 K/uL  Mean Cell Volume : 91.5 fl  Mean Cell Hemoglobin : 30.7 pg  Mean Cell Hemoglobin Concentration : 33.5 gm/dL  Auto Neutrophil # : 5.26 K/uL  Auto Lymphocyte # : 1.01 K/uL  Auto Monocyte # : 0.58 K/uL  Auto Eosinophil # : 0.42 K/uL  Auto Basophil # : 0.04 K/uL  Auto Neutrophil % : 71.7 %  Auto Lymphocyte % : 13.7 %  Auto Monocyte % : 7.9 %  Auto Eosinophil % : 5.7 %  Auto Basophil % : 0.5 %    10-14    138  |  109[H]  |  27[H]  ----------------------------<  88  4.2   |  24  |  0.86    Ca    9.7      14 Oct 2024 07:39  Mg     2.1     10-13      Urinalysis Basic - ( 14 Oct 2024 07:39 )    Color: x / Appearance: x / SG: x / pH: x  Gluc: 88 mg/dL / Ketone: x  / Bili: x / Urobili: x   Blood: x / Protein: x / Nitrite: x   Leuk Esterase: x / RBC: x / WBC x   Sq Epi: x / Non Sq Epi: x / Bacteria: x        ALLERGIES  No Known Allergies  aspirin (Other)      MEDICATIONS   acetaminophen     Tablet .. 650 milliGRAM(s) Oral every 6 hours PRN  aluminum hydroxide/magnesium hydroxide/simethicone Suspension 30 milliLiter(s) Oral every 4 hours PRN  aspirin  chewable 81 milliGRAM(s) Oral daily  atorvastatin 40 milliGRAM(s) Oral at bedtime  levothyroxine 100 MICROGram(s) Oral daily  melatonin 3 milliGRAM(s) Oral at bedtime PRN  metoprolol succinate ER 25 milliGRAM(s) Oral daily  multivitamin 1 Tablet(s) Oral daily  ondansetron Injectable 4 milliGRAM(s) IV Push every 8 hours PRN  sodium chloride 0.9%. 1000 milliLiter(s) IV Continuous <Continuous>  sodium chloride 0.9%. 1000 milliLiter(s) IV Continuous <Continuous>      ----------------------------------------------------------------------------------------  PHYSICAL EXAM  Constitutional - NAD, Comfortable  HEENT - NCAT, EOMI  Neck - Supple, No limited ROM  Chest - Breathing comfortably, No wheezing  Cardiovascular - S1S2   Abdomen - Soft   Extremities - No C/C/E, No calf tenderness   Neurologic Exam -                    Cognitive - AAO to self, place, date, year, situation     Communication - Fluent, No dysarthria     Cranial Nerves - CN 2-12 intact     Motor - No focal deficits                    LEFT    UE - ShAB 3/5, EF 3/5, EE 3/5, WE 3/5,  3/5                    RIGHT UE - ShAB 3/5, EF 3/5, EE 3/5, WE 3/5,  3/5                    LEFT    LE - HF 3/5, KE 3/5, DF 3/5, PF 3/5                    RIGHT LE - HF 3/5, KE 3/5, DF 3/5, PF 3/5         Sensory - Intact to LT     Reflexes - DTR Intact, No primitive reflexive     Coordination - FTN intact     OculoVestibular - No saccades, No nystagmus, VOR         Balance - WNL Static  Psychiatric - Mood stable, Affect WNL  ----------------------------------------------------------------------------------------

## 2024-10-14 NOTE — OCCUPATIONAL THERAPY INITIAL EVALUATION ADULT - COGNITIVE, VISUAL PERCEPTUAL, OT EVAL
Pt will maintain visual attention to stimuli on the L side 3/5 times during session in 1 week to enhance safety with ADL tasks.

## 2024-10-14 NOTE — PROGRESS NOTE ADULT - ASSESSMENT
89 y/o female w/PMHx of HTN, HLD, AS s/p TAVR, MR, PPM, Hypothyroidism, GERD, OA/RA , pacemaker, right burrholes for hematoma evacuation (2015) presents s/p fall several days ago and increase sleep and lethargy per daughter. Dt r provides most of history, pt [place] and provides only partial history. Pt fell 2 days ago, unclear if true syncope - dtr felt there was no headstrike as she examined patient and did not feel notice any traumatic physical sequelae from fall. PT could not confirm HS or not. She states she felt fine. No preceding cp or sob. No urinary ssx - UA in ED appears infected. No recent fevers or chills.     ED course: CTH: acute vs subacute cva; 5mm R parietal lobe hemorrhage vs lesion, repeat performed, stable. Evaluated by neurosx, no acute surgical intervention, OK to start A/C if needed.   Made dtr aware.   Start on CFTX in ED  BP uncontrolled, s/p IV labetolol 5mg x 2, metoprolol 25mg po x 1      #Acute CVA-   right occipital infarct and small right parietal hyperdensity concerning for hemorrhage - embolic stroke cannot be ruled out  #Right sided 5mm parietal lobe hemorrhage   #Fall vs syncope  #UTI, AUR-  tele  ; no AF seen on telemetryunclear if MINNIE findings will   -- less invasive management may be more appropriate in this elderly patient.   .would need loop recorder  since PPM may not detect all AF episodes  Repeat CT head showing stable exam. No indication for keppra   The PPM interrogation did not detect any episodes of afib  continue ASA 81mg daily  -continue high intensity statin  - TTE left atrial enlargement  EEG no seizure  - Lipid / A1C 5    # S/P rapid response 10/13   syncope likely from hypotension/hypoperfusion  afebrile   now more awake, s/p IVF  stat CT head   dw intensivist, neuro cardio    # UTI AUR  on ceftriaxone  complted 3 days   ucx NGTD  dc with [facility], trial of void at rehab       - DVT px: SCDs  DNR/I  -d/w dtr  dw neuro , RN team , OT and Dr rivera consult, bladder scan q 6hrs   total time spent55 min

## 2024-10-14 NOTE — DIETITIAN INITIAL EVALUATION ADULT - PERTINENT MEDS FT
MEDICATIONS  (STANDING):  aspirin  chewable 81 milliGRAM(s) Oral daily  atorvastatin 40 milliGRAM(s) Oral at bedtime  levothyroxine 100 MICROGram(s) Oral daily  metoprolol succinate ER 25 milliGRAM(s) Oral daily  multivitamin 1 Tablet(s) Oral daily  sodium chloride 0.9%. 1000 milliLiter(s) (100 mL/Hr) IV Continuous <Continuous>  sodium chloride 0.9%. 1000 milliLiter(s) (75 mL/Hr) IV Continuous <Continuous>    MEDICATIONS  (PRN):  acetaminophen     Tablet .. 650 milliGRAM(s) Oral every 6 hours PRN Temp greater or equal to 38C (100.4F), Mild Pain (1 - 3)  aluminum hydroxide/magnesium hydroxide/simethicone Suspension 30 milliLiter(s) Oral every 4 hours PRN Dyspepsia  melatonin 3 milliGRAM(s) Oral at bedtime PRN Insomnia  ondansetron Injectable 4 milliGRAM(s) IV Push every 8 hours PRN Nausea and/or Vomiting

## 2024-10-14 NOTE — DIETITIAN INITIAL EVALUATION ADULT - PERTINENT LABORATORY DATA
10-14    138  |  109[H]  |  27[H]  ----------------------------<  88  4.2   |  24  |  0.86    Ca    9.7      14 Oct 2024 07:39  Mg     2.1     10-13    POCT Blood Glucose.: 114 mg/dL (10-13-24 @ 12:47)  A1C with Estimated Average Glucose Result: 5.0 % (10-12-24 @ 06:20)

## 2024-10-14 NOTE — OCCUPATIONAL THERAPY INITIAL EVALUATION ADULT - GENERAL OBSERVATIONS, REHAB EVAL
Pt rec'd/left semi-supine in bed, daughter and son-in-law at bedside, +HM, +PIV, lines intact, agreeable to OT IE.

## 2024-10-14 NOTE — CHART NOTE - NSCHARTNOTEFT_GEN_A_CORE
89 y/o female w/PMHx of HTN, HLD, AS s/p TAVR, MR, PPM, Hypothyroidism, GERD, OA/RA , pacemaker, right burrholes for hematoma evacuation (2015) presents s/p fall several days ago and increase sleep and lethargy per daughter. Dt r provides most of history, pt [place] and provides only partial history. Pt fell 2 days ago, unclear if true syncope - dtr felt there was no headstrike as she examined patient and did not feel notice any traumatic physical sequelae from fall. PT could not confirm HS or not. She states she felt fine. No preceding cp or sob. No urinary ssx - UA in ED appears infected. No recent fevers or chills.     ED course: CTH: acute vs subacute cva; 5mm R parietal lobe hemorrhage vs lesion, repeat performed, stable. Evaluated by neurosx, no acute surgical intervention, OK to start A/C if needed.   Made dtr aware.   Start on CFTX in ED  BP uncontrolled, s/p IV labetolol 5mg x 2, metoprolol 25mg po x 1    RRT called early afternoon for hypotension. Resuscitated w IVF, repeat CT + evolving R PCA infarct w mild petechial hem/laminar necrosis; hemorrhagic lesion R corona radiata        Called tonight for /93  HR 70s   T 98      pulse ox 98% RA        Vital Signs Last 24 Hrs  T(C): 36.6 (14 Oct 2024 01:41), Max: 36.7 (13 Oct 2024 15:19)  T(F): 97.9 (14 Oct 2024 01:41), Max: 98.1 (13 Oct 2024 15:19)  HR: 66 (14 Oct 2024 01:41) (65 - 74)  BP: 202/93 (14 Oct 2024 01:41) (104/63 - 202/93)  BP(mean): 125 (14 Oct 2024 01:41) (125 - 125)  RR: 18 (14 Oct 2024 01:41) (18 - 18)  SpO2: 100% (14 Oct 2024 01:41) (98% - 100%)    Parameters below as of 14 Oct 2024 01:41  Patient On (Oxygen Delivery Method): room air                    A/P      #Uncontrolled HTN  1. hydralazine 5 mg IV x 1  2. continue to monitor 89 y/o female w/PMHx of HTN, HLD, AS s/p TAVR, MR, PPM, Hypothyroidism, GERD, OA/RA , pacemaker, right burrholes for hematoma evacuation (2015) presents s/p fall several days ago and increase sleep and lethargy per daughter. Dt r provides most of history, pt [place] and provides only partial history. Pt fell 2 days ago, unclear if true syncope - dtr felt there was no headstrike as she examined patient and did not feel notice any traumatic physical sequelae from fall. PT could not confirm HS or not. She states she felt fine. No preceding cp or sob. No urinary ssx - UA in ED appears infected. No recent fevers or chills.     ED course: CTH: acute vs subacute cva; 5mm R parietal lobe hemorrhage vs lesion, repeat performed, stable. Evaluated by neurosx, no acute surgical intervention, OK to start A/C if needed.   Made dtr aware.   Start on CFTX in ED  BP uncontrolled, s/p IV labetolol 5mg x 2, metoprolol 25mg po x 1    RRT called early afternoon for hypotension. Resuscitated w IVF, repeat CT + evolving R PCA infarct w mild petechial hem/laminar necrosis; hemorrhagic lesion R corona radiata        Called tonight for /93  HR 70s   T 98      pulse ox 98% RA      ROS  no headache, no CP or SOB, no tingling  no N, V      MEDICATIONS  (STANDING):  aspirin  chewable 81 milliGRAM(s) Oral daily  atorvastatin 40 milliGRAM(s) Oral at bedtime  levothyroxine 100 MICROGram(s) Oral daily  metoprolol succinate ER 25 milliGRAM(s) Oral daily  metoprolol tartrate 12.5 milliGRAM(s) Oral once  multivitamin 1 Tablet(s) Oral daily  sodium chloride 0.9%. 1000 milliLiter(s) (100 mL/Hr) IV Continuous <Continuous>  sodium chloride 0.9%. 1000 milliLiter(s) (75 mL/Hr) IV Continuous <Continuous>    MEDICATIONS  (PRN):  acetaminophen     Tablet .. 650 milliGRAM(s) Oral every 6 hours PRN Temp greater or equal to 38C (100.4F), Mild Pain (1 - 3)  aluminum hydroxide/magnesium hydroxide/simethicone Suspension 30 milliLiter(s) Oral every 4 hours PRN Dyspepsia  melatonin 3 milliGRAM(s) Oral at bedtime PRN Insomnia  ondansetron Injectable 4 milliGRAM(s) IV Push every 8 hours PRN Nausea and/or Vomiting        Vital Signs Last 24 Hrs  T(C): 36.6 (14 Oct 2024 01:41), Max: 36.7 (13 Oct 2024 15:19)  T(F): 97.9 (14 Oct 2024 01:41), Max: 98.1 (13 Oct 2024 15:19)  HR: 66 (14 Oct 2024 01:41) (65 - 74)  BP: 202/93 (14 Oct 2024 01:41) (104/63 - 202/93)  BP(mean): 125 (14 Oct 2024 01:41) (125 - 125)  RR: 18 (14 Oct 2024 01:41) (18 - 18)  SpO2: 100% (14 Oct 2024 01:41) (98% - 100%)    Parameters below as of 14 Oct 2024 01:41  Patient On (Oxygen Delivery Method): room air    GEN elderly female in NAD  HEENT pupils reactive, EOMI, clear sclera  RESP unlabored resp BS clear  COR RR S1 + S2 no edema  GI + BS soft nontender  MSK no calf tenderness  NEURO alert and oriented to self and to elevated BP  fluid speech "I want breakfast now"  CABRERA  DERM warm and dry  PSYCH nl affect        A/P        #Acute CVA  Right occipital infarct and small right parietal hyperdensity concerning for hemorrhage - embolic stroke cannot be ruled out  #Right sided 5mm parietal lobe hemorrhage   #Uncontrolled HTN  1. hydralazine 5 mg IV x 1  2. lopressor 12.5 mg po as IV was lost and pt is a difficult stick  3. continue to monitor BP      #TAVR  #UTI  management as per day hospitalist team

## 2024-10-14 NOTE — DIETITIAN INITIAL EVALUATION ADULT - ORAL INTAKE PTA/DIET HISTORY
Pt reports that her daughter and son-in-law shop and cook for her.  She is a bit lethargic and in pain, she does not give a detailed history.  PO intake estimated < 75% ENN > one month.

## 2024-10-14 NOTE — DIETITIAN INITIAL EVALUATION ADULT - ADD RECOMMEND
Maintain DASH diet  MVI w/ minerals daily to ensure 100% RDA met   Record PO intake in EMR after each meal (flowsheet, nursing.)   Monitor bowel movements, if no BM for >3 days, consider implementing bowel regimen.   Consider adding thiamine 100 mg daily 2/2 poor PO intake/ malnutrition  suggest Confirm Goals of Care regarding nutrition support. Will provide nutrition/ hydration within goals of care.   Recommendations to follow in Plan/Intervention

## 2024-10-14 NOTE — PROGRESS NOTE ADULT - ASSESSMENT
89 year old woman with dementia, HTN, HLD, AS s/p TAVR, PPM, hypothyroidism, prior SDH, admitted 10/10 for fall and weakness, discovered on CTH to have a subacute R PCA stroke.  On exam this AM, she does have a visual field deficit on the L, and is notably disoriented to month, and year.  CTH with R occipital infarct as noted, also small focus of hyperdensity in the white matter of the R parietal cortex.   [place] stroke of unknown source at this time.  She does have an enlarged L atrium.  The PPM interrogation did not detect any episodes of afib, but would not be considered conclusive per cardiology.     -continue ASA 81mg daily  -continue high intensity statin  -would consider a loop recorder for detection of atrial fibrillation.  Should be discussed with family along with the overall goals of care.   -CTA H&N unable to be performed due to IV access issues.  Will obtain a carotid duplex.    -PT/OT/SLP  -neurology to follow.  Please page or call with any acute neuro changes, or other issues we can help address.

## 2024-10-14 NOTE — OCCUPATIONAL THERAPY INITIAL EVALUATION ADULT - VISUAL ASSESSMENT: TRACKING
pt with difficulty following smooth pursuit assessment, pt wears glasses and h/o cataract sx per daughter, pt with decreased object identification in her left superior and inferior temporal quadrants of L eye and L superior/inferior nasal quadrant of R eye, line bisection test pt ignores all items on R side of the page. pt with difficulty following smooth pursuit assessment, pt wears glasses and h/o cataract sx per daughter, pt with decreased object identification in her left superior and inferior temporal quadrants of L eye and L superior/inferior nasal quadrant of R eye, line bisection test pt ignores all items on L side of the page.

## 2024-10-14 NOTE — OCCUPATIONAL THERAPY INITIAL EVALUATION ADULT - ADDITIONAL COMMENTS
Pt resides in a  with her dtr and son in law, 3 steps to enter house, stays on 1st floor. PTA pt required supervision assist for ADL's, her dtr performed IADLs, and she walked with a RW with supervision assist. Pt has a walk in shower +SC, +GB, +GB over CHT.

## 2024-10-14 NOTE — OCCUPATIONAL THERAPY INITIAL EVALUATION ADULT - RANGE OF MOTION EXAMINATION, UPPER EXTREMITY
with exception of b/l hands per daughter pt with h/o RA, decreased AROM at MCP joints/bilateral UE Active ROM was WFL  (within functional limits)

## 2024-10-14 NOTE — CONSULT NOTE ADULT - ASSESSMENT
ASSESSMENT/PLAN  89yFemale with functional deficits after  Pain - Tylenol  DVT PPX - SCDs  Rehab -     pending evaluation     Recommend ACUTE inpatient rehabilitation for the functional deficits consisting of 3 hours of therapy/day & 24 hour RN/daily PMR physician for comorbid medical management. Patient will be able to tolerate 3 hours a day.   Recommend [facility], patient DOES NOT meet acute inpatient rehabilitation criteria. Patient needs a more prolonged stay to achieve transition to community.    Expect patient to achieve functional goals for DC HOME with OUTPATIENT   Expect patient to achieve functional goals for DC HOME with HOME CARE   Follow up with CONCUSSION PROGRAM - Call [phone] for an appointment    Will continue to follow. Functional progress will determine ongoing rehab dispo recommendations, which may change.    Continue bedside therapy as well as OOB throughout the day with mobilization by staff to maintain cardiopulmonary function and prevention of secondary complications related to debility.      ASSESSMENT/PLAN  89yFemale with functional deficits after Evolving subacute right PCA infarct, with mild petechial hemorrhage/laminar necrosis.  Pain - Tylenol  DVT PPX - SCDs  Rehab -    Recommend [facility], patient DOES NOT meet acute inpatient rehabilitation criteria. Patient needs a more prolonged stay to achieve transition to community.      Will continue to follow. Functional progress will determine ongoing rehab dispo recommendations, which may change.    Continue bedside therapy as well as OOB throughout the day with mobilization by staff to maintain cardiopulmonary function and prevention of secondary complications related to debility.

## 2024-10-14 NOTE — DIETITIAN INITIAL EVALUATION ADULT - OTHER INFO
89 y/o female w/PMHx of HTN, HLD, AS s/p TAVR, MR, PPM, Hypothyroidism, GERD, OA/RA , pacemaker, right burrholes for hematoma evacuation (2015) presents s/p fall severa ldays ago and increase sleep and lethargy per daughter. Dt rprovides most of history, pt [place] and provides only partial history. Pt fell 2 days ago, unclear if true syncope - dtr felt there was no headstrike as she examined patient and did not feel notice any traumatic physical sequelae from fall. PT could not confirm HS or not. She states she felt fine. No preceding cp or sob. No urinary ssx - UA in ED appears infected. No recent fevers or chills.     Admit dx   Cerebral Infarction  EMR weight is 63 kg   140#  RD bedscale wt on 3/21/2023 was 65 kg, pt was dx'ed with moderate malnutrition  Unable to get bedscale wt  NFPE reveals muscle wasting, fat wasting, moderate  Suggest maintain DASH diet  Ensure plus 1x day  suggest Confirm Goals of Care regarding nutrition support. Will provide nutrition/ hydration within goals of care.   Pt had RR on 10/13, pt was found unresponsive, is currently lethargic, wants to go home.   Recommendations to follow in Plan/Intervention

## 2024-10-14 NOTE — OCCUPATIONAL THERAPY INITIAL EVALUATION ADULT - PERTINENT HX OF CURRENT PROBLEM, REHAB EVAL
89 y/o female w/PMHx of HTN, HLD, AS s/p TAVR, MR, PPM, Hypothyroidism, GERD, OA/RA , pacemaker, right chris holes for hematoma evacuation (2015) presents s/p fall several days ago and increase sleep and lethargy per daughter. Dtr provides most of history, pt [place] and provides only partial history. Pt fell 2 days ago, unclear if true syncope - dtr felt there was no head strike as she examined patient and did not feel notice any traumatic physical sequelae from fall. CTH: acute vs subacute cva; 5mm R parietal lobe hemorrhage vs lesion. On 10/13/24- RRT called for unresponsiveness while sitting in the chair. SBPs 50s, Sinus Bradycardic HR 50s. R pupil newly fixed, non-reactive to light.

## 2024-10-14 NOTE — OCCUPATIONAL THERAPY INITIAL EVALUATION ADULT - MD ORDER
"OT Evaluate and Treat"- MD orders received. Chart reviewed, contents noted, conferred with RN- cleared for OT IE, pt with RR called on 10/13 2* hypotension, no changes in fxl status, cleared for IE. Please refer to Therapeutic Interventions under Adult A & I for future documentation and treatment notes.

## 2024-10-14 NOTE — DIETITIAN INITIAL EVALUATION ADULT - NAME AND PHONE
Dorota Harper RDN, CDN, [facility]      [phone]   [email or web address]

## 2024-10-15 ENCOUNTER — TRANSCRIPTION ENCOUNTER (OUTPATIENT)
Age: 89
End: 2024-10-15

## 2024-10-15 VITALS
TEMPERATURE: 98 F | RESPIRATION RATE: 18 BRPM | HEART RATE: 74 BPM | OXYGEN SATURATION: 91 % | SYSTOLIC BLOOD PRESSURE: 132 MMHG | DIASTOLIC BLOOD PRESSURE: 60 MMHG

## 2024-10-15 PROCEDURE — 99232 SBSQ HOSP IP/OBS MODERATE 35: CPT

## 2024-10-15 PROCEDURE — 99233 SBSQ HOSP IP/OBS HIGH 50: CPT

## 2024-10-15 RX ORDER — ASPIRIN 325 MG
1 TABLET ORAL
Qty: 0 | Refills: 0 | DISCHARGE
Start: 2024-10-15

## 2024-10-15 RX ORDER — ATORVASTATIN CALCIUM 10 MG/1
1 TABLET, FILM COATED ORAL
Qty: 0 | Refills: 0 | DISCHARGE
Start: 2024-10-15

## 2024-10-15 RX ADMIN — Medication 25 MILLIGRAM(S): at 10:06

## 2024-10-15 RX ADMIN — Medication 81 MILLIGRAM(S): at 10:07

## 2024-10-15 RX ADMIN — MULTI VITAMIN/MINERAL SUPPLEMENT WITH ASCORBIC ACID, NIACIN, PYRIDOXINE, PANTOTHENIC ACID, FOLIC ACID, RIBOFLAVIN, THIAMIN, BIOTIN, COBALAMIN AND ZINC. 1 TABLET(S): 60; 20; 12.5; 10; 10; 1.7; 1.5; 1; .3; .006 TABLET, COATED ORAL at 10:07

## 2024-10-15 RX ADMIN — Medication 100 MICROGRAM(S): at 05:44

## 2024-10-15 NOTE — DISCHARGE NOTE PROVIDER - DETAILS OF MALNUTRITION DIAGNOSIS/DIAGNOSES
This patient has been assessed with a concern for Malnutrition and was treated during this hospitalization for the following Nutrition diagnosis/diagnoses:     -  10/14/2024: Severe protein-calorie malnutrition

## 2024-10-15 NOTE — DISCHARGE NOTE PROVIDER - CARE PROVIDER_API CALL
Johnna Mendoza  Neurosurgery  [address] [place] [address]  Phone: [phone]  Fax: [phone]  Follow Up Time:     Elieser Douglas  Cardiovascular Disease  [address] [facility]  [place] [address]  Phone: [phone]  Fax: [phone]  Follow Up Time:     Maranda Dean  Cardiac Electrophysiology  [address] [place] [address]  Phone: [phone]  Fax: [phone]  Follow Up Time:

## 2024-10-15 NOTE — PROGRESS NOTE ADULT - PROBLEM SELECTOR PLAN 1
. Pt S/P fall, presents with lethargy, found to have acute / subacute R occipital infarct and concern for small R parietal hemorrhage.  Also found to have UTI.    . CT head (10/10) R occipital infarct as noted, also small focus of hyperdensity in white matter of R parietal cortex  . pt denies any CV complaints; 24 hr tele reviewed SR / AV paced with no Afib;  no Afib on PPM interrogation; unclear if MINNIE findings will  -- less invasive management may be more appropriate in this elderly patient; can pursue ambulatory ECG monitoring to screen for Afib since PPM may not detect all Afib episodes  . Neuro and Neurosurgery following.  No acute surgical intervention.  . CTH shows Evolving subacute right PCA infarct, with mild petechial hemorrhage/laminar necrosis. Hemorrhagic lesion in the right corona radiata. Gadolinium MRI recommended.  . carotid US shows Prominent, calcified atheromatous plaque present along the course of both the right and left carotid arteries in the neck. Despite this, no hemodynamically significant internal carotid artery   stenoses are identified. There is a flow-limiting stenosis of the left external carotid artery.  . cont ASA, statin  . UTI:  management per primary team
Pt presents with lethargy, found to have acute / subacute R occipital infarct and concern for small R parietal hemorrhage.  Also found to have UTI.  Of note, pt with cardiac hx HTN, HLD, CHB (hx PPM), ASHD and hx TAVR (2018).  -  CT head (10/10) R occipital infarct as noted, also small focus of hyperdensity in white matter of R parietal cortex  -  pt denies any CV complaints; 24 hr tele reviewed SR / AV paced with no Afib;  no Afib on PPM interrogation; unclear if MINNIE findings will  -- less invasive management may be more appropriate in this elderly patient; can pursue ambulatory ECG monitoring to screen for Afib since PPM may not detect all Afib episodes  -  Neuro and Neurosurgery following, no acute surgical intervention, CT head and carotid US pend; cont ASA, statin  -  UTI:  mgnt per primary team

## 2024-10-15 NOTE — PROGRESS NOTE ADULT - NUTRITIONAL ASSESSMENT
This patient has been assessed with a concern for Malnutrition and has been determined to have a diagnosis/diagnoses of Severe protein-calorie malnutrition.    This patient is being managed with:   Diet DASH/TLC-  Sodium & Cholesterol Restricted  Entered: Oct 10 2024 11:36PM  

## 2024-10-15 NOTE — PROGRESS NOTE ADULT - SUBJECTIVE AND OBJECTIVE BOX
89 y/o female w/PMHx of HTN, HLD, AS s/p TAVR, MR, PPM, Hypothyroidism, GERD, OA/RA , pacemaker, right burrholes for hematoma evacuation (2015) presents s/p fall severa ldays ago and increase sleep and lethargy per daughter. Dt rprovides most of history, pt [place] and provides only partial history. Pt fell 2 days ago, unclear if true syncope - dtr felt there was no headstrike as she examined patient and did not feel notice any traumatic physical sequelae from fall. PT could not confirm HS or not. She states she felt fine. No preceding cp or sob. No urinary ssx - UA in ED appears infected. No recent fevers or chills.     ED course: CTH: acute vs subacute cva; 5mm R parietal lobe hemorrhage vs lesion, repeat performed, stable. Evaluated by neurosx, no acute surgical intervention, OK to start A/C if needed.   Made dtr aware.   Start on CFTX in ED  BP uncontrolled, s/p IV labetolol 5mg x 2, metoprolol 25mg po x 1  Review of Systems: REVIEW OF SYSTEMS:    CONSTITUTIONAL: No weakness, fevers or chills  EYES/ENT: No visual changes;  No vertigo or throat pain   NECK: No pain or stiffness  RESPIRATORY: No cough, wheezing, hemoptysis; No shortness of breath  CARDIOVASCULAR: No chest pain or palpitations  GASTROINTESTINAL: No abdominal or epigastric pain. No nausea, vomiting, or hematemesis; No diarrhea or constipation. No melena or hematochezia.  GENITOURINARY: No dysuria, frequency or hematuria  NEUROLOGICAL: No numbness or weakness  SKIN: No itching, burning, rashes, or lesions   All other review of systems is negative unless indicated above      10/13  RRT-unresponsiveness while sitting in the chair. SBPs 50s, Sinus Bradycardic HR 50s. R pupil newly fixed, non-reactive to light.    Moved patient back to bed, placed in trendelenbergStarted rapid 1L LR  bolus with pressure bag. Pupillary changes resolved, likely present iso cerebral hypoperfusion from hypotension.   Spoke with family in hallway, will keep in place DNR / DNI, would be agreeable to pressors in future if hypotension recurs.repeat head scans once BP remains sky after finishing bolus    PHYSICAL EXAM:    Constitutional: NAD, awake and alert  HEENT: PERR, EOMI, Normal Hearing, MMM  Neck: Soft and supple, No LAD, No JVD  Respiratory: Breath sounds are clear bilaterally, No wheezing, rales or rhonchi  Cardiovascular: S1 and S2, regular rate and rhythm, no Murmurs, gallops or rubs  Gastrointestinal: Bowel Sounds present, soft, nontender, nondistended, no guarding, no rebound  Extremities: No peripheral edema  Vascular: 2+ peripheral pulses  Neurological: A/O x 3, no focal deficits  Musculoskeletal: 5/5 strength b/l upper and lower extremities  Skin: No rashes      PHYSICAL EXAM:    Daily     Daily     ICU Vital Signs Last 24 Hrs  T(C): 36 (13 Oct 2024 08:55), Max: 36.7 (12 Oct 2024 15:59)  T(F): 96.8 (13 Oct 2024 08:55), Max: 98 (12 Oct 2024 15:59)  HR: 68 (13 Oct 2024 08:55) (65 - 79)  BP: 104/63 (13 Oct 2024 13:38) (104/63 - 173/67)  BP(mean): --  ABP: --  ABP(mean): --  RR: 18 (13 Oct 2024 08:55) (18 - 18)  SpO2: 98% (13 Oct 2024 08:55) (95% - 98%)    O2 Parameters below as of 13 Oct 2024 08:55  Patient On (Oxygen Delivery Method): room air                                    11.6   6.41  )-----------( 140      ( 12 Oct 2024 06:20 )             35.4       CBC Full  -  ( 12 Oct 2024 06:20 )  WBC Count : 6.41 K/uL  RBC Count : 3.85 M/uL  Hemoglobin : 11.6 g/dL  Hematocrit : 35.4 %  Platelet Count - Automated : 140 K/uL  Mean Cell Volume : 91.9 fl  Mean Cell Hemoglobin : 30.1 pg  Mean Cell Hemoglobin Concentration : 32.8 gm/dL  Auto Neutrophil # : x  Auto Lymphocyte # : x  Auto Monocyte # : x  Auto Eosinophil # : x  Auto Basophil # : x  Auto Neutrophil % : x  Auto Lymphocyte % : x  Auto Monocyte % : x  Auto Eosinophil % : x  Auto Basophil % : x      10-13    139  |  110[H]  |  28[H]  ----------------------------<  88  4.0   |  24  |  1.08    Ca    9.4      13 Oct 2024 06:32                      Urinalysis Basic - ( 13 Oct 2024 06:32 )    Color: x / Appearance: x / SG: x / pH: x  Gluc: 88 mg/dL / Ketone: x  / Bili: x / Urobili: x   Blood: x / Protein: x / Nitrite: x   Leuk Esterase: x / RBC: x / WBC x   Sq Epi: x / Non Sq Epi: x / Bacteria: x            MEDICATIONS  (STANDING):  aspirin  chewable 81 milliGRAM(s) Oral daily  atorvastatin 40 milliGRAM(s) Oral at bedtime  levothyroxine 100 MICROGram(s) Oral daily  metoprolol succinate ER 25 milliGRAM(s) Oral daily  multivitamin 1 Tablet(s) Oral daily      
89 y/o female w/PMHx of HTN, HLD, AS s/p TAVR, MR, PPM, Hypothyroidism, GERD, OA/RA , pacemaker, right burrholes for hematoma evacuation (2015) presents s/p fall severa ldays ago and increase sleep and lethargy per daughter. Dt rprovides most of history, pt [place] and provides only partial history. Pt fell 2 days ago, unclear if true syncope - dtr felt there was no headstrike as she examined patient and did not feel notice any traumatic physical sequelae from fall. PT could not confirm HS or not. She states she felt fine. No preceding cp or sob. No urinary ssx - UA in ED appears infected. No recent fevers or chills.     ED course: CTH: acute vs subacute cva; 5mm R parietal lobe hemorrhage vs lesion, repeat performed, stable. Evaluated by neurosx, no acute surgical intervention, OK to start A/C if needed.   Made dtr aware.   Start on CFTX in ED  BP uncontrolled, s/p IV labetolol 5mg x 2, metoprolol 25mg po x 1    10/14 mentationbaseline  AUR wfoley placed  10/13 overnight no new defeicts neurological, BP stable , carotid doppler pending and outpt ECG monito, defer MINNIE per cardio and   Review of Systems: REVIEW OF SYSTEMS:    CONSTITUTIONAL: No weakness, fevers or chills  EYES/ENT: No visual changes;  No vertigo or throat pain   NECK: No pain or stiffness  RESPIRATORY: No cough, wheezing, hemoptysis; No shortness of breath  CARDIOVASCULAR: No chest pain or palpitations  GASTROINTESTINAL: No abdominal or epigastric pain. No nausea, vomiting, or hematemesis; No diarrhea or constipation. No melena or hematochezia.  GENITOURINARY: No dysuria, frequency or hematuria  NEUROLOGICAL: No numbness or weakness  SKIN: No itching, burning, rashes, or lesions   All other review of systems is negative unless indicated above  10/13  RRT-unresponsiveness while sitting in the chair. SBPs 50s, Sinus Bradycardic HR 50s. R pupil newly fixed, non-reactive to light.    Moved patient back to bed, placed in trendelenbergStarted rapid 1L LR  bolus with pressure bag. Pupillary changes resolved, likely present iso cerebral hypoperfusion from hypotension.   Spoke with family in hallway, will keep in place DNR / DNI, would be agreeable to pressors in future if hypotension recurs.repeat head scans once BP remains sky after finishing bolus    PHYSICAL EXAM:    Constitutional: NAD, awake and alert  HEENT: PERR, EOMI, Normal Hearing, MMM  Neck: Soft and supple, No LAD, No JVD  Respiratory: Breath sounds are clear bilaterally, No wheezing, rales or rhonchi  Cardiovascular: S1 and S2, regular rate and rhythm, no Murmurs, gallops or rubs  Gastrointestinal: Bowel Sounds present, soft, nontender, nondistended, no guarding, no rebound  Extremities: No peripheral edema  Vascular: 2+ peripheral pulses  Neurological: A/O x 3, no focal deficits  Musculoskeletal: 5/5 strength b/l upper and lower extremities  Skin: No rashes    labs reviewed  
CHIEF COMPLAINT: "I'm very tired."    HPI:  89 y/o year old woman with a history of severe aortic stenosis s/p TAVR (2018), single-vessel CAD (LAD), CHB s/p PPM, Chronic diastolic CHF, HTN, HLD, hypothyroidism, GERD, OA/RA, who presented to the ED on 10/10/24 for the evaluation of generalized weakness and recent fall.  She was found to have an acute/subacute infarct of the medial right occipital/temporal lobe and small hyperdensity in the right parietal white matter with adjacent minimal edema concerning for small hemorrhage; cardiology consult called because of possible thromboembolic event.    Mrs. Parry has no cardiac complaints -- not experiencing SOB, palpitations, or chest discomfort; says she is very fatigued.      10/14/24:  Pt laying in bed in NAD.  Denies CP, SOB, palpitations or lightheadedness at this time.  S/p RRT yesterday for unresponsiveness while sitting in the chair in setting of hypotension SBP 50s, improved with IVF.  Tele reviewed AR / AVpaced 60-90s, some PVCs      PAST MEDICAL & SURGICAL HISTORY:  Intraventricular block  Actinic keratosis  Aortic valve stenosis  Back pain  Hard of hearing  Subdural hematoma  Dry eyes  Fatigue  HLD (hyperlipidemia)  HTN (hypertension)  GERD (gastroesophageal reflux disease)  Syncope and collapse  Dehydration  Hypothyroid  MR (mitral regurgitation)  Nocturia  Urine incontinence  Osteoarthritis of back  H/O brain surgery, subdural  bleed,  had  chris holes  Bilateral cataracts  History of tonsillectomy  History of cholecystectomy  H/O hand surgery  Pacemaker  S/P TAVR (transcatheter aortic valve replacement)    FAMILY HISTORY:  heart attack (Father)     SOCIAL HISTORY:   Alcohol: Denied  Smoking: Nonsmoker  Drug Use: Denied  Marital Status:     Allergies: No Known Allergies      MEDICATIONS  Home Medications:  ergocalciferol 1.25 mg (50,000 intl units) oral capsule: 1 cap(s) orally once a week on Fridays (11 Oct 2024 09:18)  iron blood builder: take 1 tablet daily (11 Oct 2024 09:17)  levothyroxine 100 mcg (0.1 mg) oral tablet: 1 tab(s) orally once a day before breakfast (11 Oct 2024 09:31)  metoprolol succinate 25 mg oral tablet, extended release: 1 tab(s) orally once a day (11 Oct 2024 09:18)  Multiple Vitamins oral tablet: 1 tab(s) orally once a day (11 Oct 2024 09:18)    MEDICATIONS  (STANDING):  aspirin  chewable 81 milliGRAM(s) Oral daily  atorvastatin 40 milliGRAM(s) Oral at bedtime  levothyroxine 100 MICROGram(s) Oral daily  metoprolol succinate ER 25 milliGRAM(s) Oral daily  multivitamin 1 Tablet(s) Oral daily  sodium chloride 0.9%. 1000 milliLiter(s) (100 mL/Hr) IV Continuous <Continuous>  sodium chloride 0.9%. 1000 milliLiter(s) (75 mL/Hr) IV Continuous <Continuous>    MEDICATIONS  (PRN):  acetaminophen     Tablet .. 650 milliGRAM(s) Oral every 6 hours PRN Temp greater or equal to 38C (100.4F), Mild Pain (1 - 3)  aluminum hydroxide/magnesium hydroxide/simethicone Suspension 30 milliLiter(s) Oral every 4 hours PRN Dyspepsia  melatonin 3 milliGRAM(s) Oral at bedtime PRN Insomnia  ondansetron Injectable 4 milliGRAM(s) IV Push every 8 hours PRN Nausea and/or Vomiting      VITAL SIGNS:   Vital Signs Last 24 Hrs  T(C): 36.6 (14 Oct 2024 09:31), Max: 36.8 (14 Oct 2024 04:57)  T(F): 97.8 (14 Oct 2024 09:31), Max: 98.2 (14 Oct 2024 04:57)  HR: 63 (14 Oct 2024 09:31) (63 - 74)  BP: 148/71 (14 Oct 2024 09:31) (104/63 - 202/93)  BP(mean): 91 (14 Oct 2024 04:57) (91 - 125)  RR: 18 (14 Oct 2024 09:31) (18 - 18)  SpO2: 99% (14 Oct 2024 09:31) (99% - 100%)    Parameters below as of 14 Oct 2024 09:31  Patient On (Oxygen Delivery Method): room air    Daily   I&O's Summary  13 Oct 2024 07:01  -  14 Oct 2024 07:00  --------------------------------------------------------  IN: 0 mL / OUT: 100 mL / NET: -100 mL      PHYSICAL EXAM:  Constitutional: NAD, AAO x 2 person and place, pleasant  Pulmonary: Non-labored, breath sounds are clear bilaterally  Cardiovascular: S1 and S2, regular, +murmur, no LE edema  Gastrointestinal: Bowel Sounds present, soft, nontender.   Neurological: Alert, CABRERA, follows commands  Psych:  Mood & affect appropriate      LABS:                        12.6   7.35  )-----------( 166      ( 14 Oct 2024 07:39 )             37.6                11.6   6.41  )-----------( 140      ( 12 Oct 2024 06:20 )             35.4         10-14    138  |  109[H]  |  27[H]  ----------------------------<  88  4.2   |  24  |  0.86    Ca    9.7      14 Oct 2024 07:39  Mg     2.1     10-13      139    |  110    |  28     ----------------------------<  88     4.0     |  24     |  1.08     Ca    9.4        13 Oct 2024 06:32    TPro  7.9    /  Alb  3.6    /  TBili  0.4    /  DBili  x      /  AST  24     /  ALT  23     /  AlkPhos  98     10 Oct 2024 20:41      EKG  /  CARDIAC TESTING  < from: 12 Lead ECG (10.10.24 @ 21:15) >  Diagnosis Line Atrial-sensed ventricular-paced rhythm with prolonged AV conduction  Abnormal ECG  < end of copied text >    < from: 12 Lead ECG (10.14.24 @ 08:17) >  Line Unusual P axis, possible ectopic atrial rhythm  Right bundle branch block  Left anterior fascicular block  *** Bifascicular block ***  Abnormal ECG  When compared with ECG of 10-OCT-2024 21:15,  Ectopic atrial rhythm has replaced Electronic ventricular pacemaker  Confirmed by MD TAY, DRE ([id number]) on 10/14/2024 9:27:10 AM  < end of copied text >    TTE W or WO Ultrasound Enhancing Agent (10.11.24 @ 11:40) >   1. Left ventricular systolic function is normal with an ejection fraction of 61 % by 3D.   2. There is moderate (grade 2) left ventricular diastolic dysfunction.   3. Left atrium is severely dilated.   4. S/p TAVR - well seated.   5. No pericardial effusion seen.   6. Mild to moderate tricuspid regurgitation.   7. Estimated pulmonary artery systolic pressure is 37 mmHg.   8. Moderate mitral valve stenosis.    RADIOLOGY  < from: CT Head No Cont (10.10.24 @ 21:37) >  IMPRESSION:  Acute/subacute infarct of the medial right occipital/temporal lobe. No   midline shift or herniation. No associated hemorrhage.  A 5 mm rounded hyperdensity in the right parietal white matter with   adjacent minimal edema is new compared with 2022, concerning for small   hemorrhage given history of fall. Possibility of underlying lesion is not   excluded.  < end of copied text >    < from: Xray Chest 1 View- PORTABLE-Urgent (10.10.24 @ 22:22) >  IMPRESSION: Intrathoracic retrocardiac gastric fundal herniation.   No radiographic evidence of active chest disease..  < end of copied text >    < from: CT Head No Cont (10.13.24 @ 16:56) >  IMPRESSION:  1.  Evolving subacute right PCA infarct, with mild petechial   hemorrhage/laminar necrosis.  2.  Hemorrhagic lesion in the right corona radiata.  3.  Gadolinium MRI recommended.  < end of copied text >    PRIOR CARDIAC TESTING  < from: TTE Echo Complete w/o Contrast w/ Doppler (06.21.23 @ 08:36) >  Impression   Summary   Severemitral annular calcification is present.   There is thickening of mitral valve.   The leaflet opening is restricted.   Mean transmitral gradient is 15 mmHg; this finding is consistent with   severe mitral stenosis.   Chordal JOSHUA is suggested.   Moderate (2+) mitral regurgitation is present.   Well seated prosthetic valve in the aortic position.   Mean trans-prosthetic gradient is within normal limitations for this type   of prosthesis.   The tricuspid valve leaflets appear mildly thickened.   Mild (1+) tricuspid valve regurgitation is present.   Mild pulmonary hypertension.   Pulmonic valve not well seen.Probably normal.   Mild pulmonic valvular regurgitation (1+) is present.   The left atrium is mildly dilated.   Mild concentric left ventricular hypertrophy is present.   Estimated left ventricular ejection fraction is> 60 %.   Normal appearing right ventricle structure and function.   A device wire is seen in the RV and RA.  < end of copied text >    < from: Cardiac Cath Lab - Adult (01.11.18 @ 11:03) >  DIAGNOSTIC IMPRESSIONS: One Vessel CAD  Mid LAD = 80%  Normal LV Function (LVEF=80%)  Trileaflet Aortic Valve  Severe Aortic Stenosis ( AVG=60 mmHg and EDUIN=0.78 cm2)  Normal Aortic Root  Mild MR  Mild Pulmonary HTN ( 46/20 31 mmHg)  Mild elevation of Right heart pressures:  RA=8 mmHg; RV=48/11 mmHg; PCWP=25 mmHg  < end of copied text >  
Patient seen and examined at the bedside.     No new neurological complaints.  no acute events overnight.   Continues to report fatigue. No headache, no chest pain, or shortness of breath.     On exam:   GEN ;NAD  CV: RRR, S1, S2  PULM; CTAb  NEURO:     Awake, alert, attending the examiner, disoriented to year,and month, knows the hospital name.  Normal naming, and fluency.  Follows commands.   Pupils 3-2mm, symmetric, has L homonymous hemianopsia, full EOM, no nystagmus, no facial weakness, dysarthric.   MOTOR: normal bulk and tone, no drift, 5/5 , biceps. 5/5 hip flexors.   SENSORY: intact to light touch    CTH images reviewed: r occipital subacute hemorrhage.    Carotid duplex report reviewed: atherosclerotic plaque, bilateral common and internal carotids.  L internal carotid with flow limiting stenosis.  
Patient seen and examined at the bedside.     yesterday there was a rapid response when the patient was less responsive in setting of hypotension to 50/30.  She improved after 1l infusion of LR.     Today, she denies having any neurological complaints, including headache, or weakness.  She only reports feeling fatigued.  Of note, overnight, she experienced delirium, wanting to get out of bed.      On exam:   GEN ;NAD  CV: RRR, S1, S2  PULM; CTAb  NEURO:     Awake, alert, attending the examiner, disoriented to year ,and month, knows the hospital name.  Normal naming, and fluency.  Follows commands.   pupils 3-2mm, symmetric, has L homonymous hemianopsia, full EOM, no nystagmus, no facial weakness, dysarthric.   MOTOR: normal bulk and tone, no drift, 5/5 , biceps. 5/5 hip flexors.   SENSORY: intact to light touch    CTH images reviewed: r occipital subacute hemorrhage.      
89 y/o female w/PMHx of HTN, HLD, AS s/p TAVR, MR, PPM, Hypothyroidism, GERD, OA/RA , pacemaker, right burrholes for hematoma evacuation (2015) presents s/p fall severa ldays ago and increase sleep and lethargy per daughter. Dt rprovides most of history, pt [place] and provides only partial history. Pt fell 2 days ago, unclear if true syncope - dtr felt there was no headstrike as she examined patient and did not feel notice any traumatic physical sequelae from fall. PT could not confirm HS or not. She states she felt fine. No preceding cp or sob. No urinary ssx - UA in ED appears infected. No recent fevers or chills.     ED course: CTH: acute vs subacute cva; 5mm R parietal lobe hemorrhage vs lesion, repeat performed, stable. Evaluated by neurosx, no acute surgical intervention, OK to start A/C if needed.   Made dtr aware.   Start on CFTX in ED  BP uncontrolled, s/p IV labetolol 5mg x 2, metoprolol 25mg po x 1    10/13  RRT-unresponsiveness while sitting in the chair. SBPs 50s, Sinus Bradycardic HR 50s. R pupil newly fixed, non-reactive to light.Moved patient back to bed, placed in trendelenbergStarted rapid 1L LR  bolus with pressure bag. Pupillary changes resolved, likely present iso cerebral hypoperfusion from hypotension.   Spoke with family in hallway, will keep in place DNR / DNI, would be agreeable to pressors in future if hypotension recurs.repeat head scans once BP remains sky after finishing bolus      10/15 mentationbaseline  AUR wfoley placed  10/13  no new defeicts neurological, BP stable , carotid doppler reviewed with neurology   defer MINNIE per cardio-  Review of Systems: REVIEW OF SYSTEMS:    CONSTITUTIONAL: No weakness, fevers or chills  EYES/ENT: No visual changes;  No vertigo or throat pain   NECK: No pain or stiffness  RESPIRATORY: No cough, wheezing, hemoptysis; No shortness of breath  CARDIOVASCULAR: No chest pain or palpitations  GASTROINTESTINAL: No abdominal or epigastric pain. No nausea, vomiting, or hematemesis; No diarrhea or constipation. No melena or hematochezia.  GENITOURINARY: No dysuria, frequency or hematuria  NEUROLOGICAL: No numbness or weakness  SKIN: No itching, burning, rashes, or lesions   All other review of systems is negative unless indicated above      PHYSICAL EXAM:    Daily     Daily     ICU Vital Signs Last 24 Hrs  T(C): 36.6 (15 Oct 2024 07:42), Max: 36.6 (15 Oct 2024 07:42)  T(F): 97.8 (15 Oct 2024 07:42), Max: 97.8 (15 Oct 2024 07:42)  HR: 60 (15 Oct 2024 07:42) (60 - 63)  BP: 113/70 (15 Oct 2024 09:44) (110/51 - 138/61)  BP(mean): --  ABP: --  ABP(mean): --  RR: 18 (15 Oct 2024 07:42) (18 - 18)  SpO2: 98% (15 Oct 2024 07:42) (98% - 100%)    O2 Parameters below as of 15 Oct 2024 07:42  Patient On (Oxygen Delivery Method): room air  PHYSICAL EXAM:    Constitutional: NAD, awake and alert  HEENT: PERR, EOMI,   Neck: Soft and supple, , No JVD  Respiratory: Breath sounds are clear bilaterally, No wheezing, rales or rhonchi  Cardiovascular: S1 and S2, regular rate and rhythm, no Murmurs, gallops or rubs  Gastrointestinal: Bowel Sounds present, soft, nontender, nondistended, no guarding, no rebound  Extremities: No peripheral edema    Neurological:Awake, alert, attending the examiner, disoriented to year,and month, knows the hospital name.  Normal naming, and fluency.  Follows commands.   Pupils 3-2mm, symmetric, has L homonymous hemianopsia, full EOM, no nystagmus, no facial weakness, dysarthric.   MOTOR: normal bulk and tone, no drift, 5/5 , biceps. 5/5 hip flexors.   SENSORY: intact to light touch  Musculoskeletal: 5/5 strength b/l upper and lower extremities  Skin: No rashes                                        12.6   7.35  )-----------( 166      ( 14 Oct 2024 07:39 )             37.6       CBC Full  -  ( 14 Oct 2024 07:39 )  WBC Count : 7.35 K/uL  RBC Count : 4.11 M/uL  Hemoglobin : 12.6 g/dL  Hematocrit : 37.6 %  Platelet Count - Automated : 166 K/uL  Mean Cell Volume : 91.5 fl  Mean Cell Hemoglobin : 30.7 pg  Mean Cell Hemoglobin Concentration : 33.5 gm/dL  Auto Neutrophil # : 5.26 K/uL  Auto Lymphocyte # : 1.01 K/uL  Auto Monocyte # : 0.58 K/uL  Auto Eosinophil # : 0.42 K/uL  Auto Basophil # : 0.04 K/uL  Auto Neutrophil % : 71.7 %  Auto Lymphocyte % : 13.7 %  Auto Monocyte % : 7.9 %  Auto Eosinophil % : 5.7 %  Auto Basophil % : 0.5 %      10-14    138  |  109[H]  |  27[H]  ----------------------------<  88  4.2   |  24  |  0.86    Ca    9.7      14 Oct 2024 07:39  Mg     2.1     10-13                      Urinalysis Basic - ( 14 Oct 2024 07:39 )    Color: x / Appearance: x / SG: x / pH: x  Gluc: 88 mg/dL / Ketone: x  / Bili: x / Urobili: x   Blood: x / Protein: x / Nitrite: x   Leuk Esterase: x / RBC: x / WBC x   Sq Epi: x / Non Sq Epi: x / Bacteria: x            MEDICATIONS  (STANDING):  aspirin  chewable 81 milliGRAM(s) Oral daily  atorvastatin 40 milliGRAM(s) Oral at bedtime  levothyroxine 100 MICROGram(s) Oral daily  metoprolol succinate ER 25 milliGRAM(s) Oral daily  multivitamin 1 Tablet(s) Oral daily  sodium chloride 0.9%. 1000 milliLiter(s) (100 mL/Hr) IV Continuous <Continuous>  sodium chloride 0.9%. 1000 milliLiter(s) (75 mL/Hr) IV Continuous <Continuous>      
89 y/o female w/PMHx of HTN, HLD, AS s/p TAVR, MR, PPM, Hypothyroidism, GERD, OA/RA , pacemaker, right burrholes for hematoma evacuation (2015) presents s/p fall severa ldays ago and increase sleep and lethargy per daughter. Dt rprovides most of history, pt [place] and provides only partial history. Pt fell 2 days ago, unclear if true syncope - dtr felt there was no headstrike as she examined patient and did not feel notice any traumatic physical sequelae from fall. PT could not confirm HS or not. She states she felt fine. No preceding cp or sob. No urinary ssx - UA in ED appears infected. No recent fevers or chills.     ED course: CTH: acute vs subacute cva; 5mm R parietal lobe hemorrhage vs lesion, repeat performed, stable. Evaluated by neurosx, no acute surgical intervention, OK to start A/C if needed.   Made dtr aware.   Start on CFTX in ED  BP uncontrolled, s/p IV labetolol 5mg x 2, metoprolol 25mg po x 1  Review of Systems: REVIEW OF SYSTEMS:    CONSTITUTIONAL: No weakness, fevers or chills  EYES/ENT: No visual changes;  No vertigo or throat pain   NECK: No pain or stiffness  RESPIRATORY: No cough, wheezing, hemoptysis; No shortness of breath  CARDIOVASCULAR: No chest pain or palpitations  GASTROINTESTINAL: No abdominal or epigastric pain. No nausea, vomiting, or hematemesis; No diarrhea or constipation. No melena or hematochezia.  GENITOURINARY: No dysuria, frequency or hematuria  NEUROLOGICAL: No numbness or weakness  SKIN: No itching, burning, rashes, or lesions   All other review of systems is negative unless indicated above      10/12 afebrile, AV paced , sinus marcello, on tele, bladrer scan 550cc , staright cath x1     PHYSICAL EXAM:    Constitutional: NAD, awake and alert  HEENT: PERR, EOMI, Normal Hearing, MMM  Neck: Soft and supple, No LAD, No JVD  Respiratory: Breath sounds are clear bilaterally, No wheezing, rales or rhonchi  Cardiovascular: S1 and S2, regular rate and rhythm, no Murmurs, gallops or rubs  Gastrointestinal: Bowel Sounds present, soft, nontender, nondistended, no guarding, no rebound  Extremities: No peripheral edema  Vascular: 2+ peripheral pulses  Neurological: A/O x 3, no focal deficits  Musculoskeletal: 5/5 strength b/l upper and lower extremities  Skin: No rashes      PHYSICAL EXAM:    Daily     Daily     ICU Vital Signs Last 24 Hrs  T(C): 36.7 (12 Oct 2024 15:59), Max: 36.7 (12 Oct 2024 15:59)  T(F): 98 (12 Oct 2024 15:59), Max: 98 (12 Oct 2024 15:59)  HR: 65 (12 Oct 2024 15:59) (65 - 83)  BP: 121/58 (12 Oct 2024 15:59) (121/58 - 176/87)  BP(mean): --  ABP: --  ABP(mean): --  RR: 18 (12 Oct 2024 15:59) (18 - 18)  SpO2: 95% (12 Oct 2024 15:59) (95% - 97%)    O2 Parameters below as of 12 Oct 2024 15:59  Patient On (Oxygen Delivery Method): room air                                    11.6   6.41  )-----------( 140      ( 12 Oct 2024 06:20 )             35.4       CBC Full  -  ( 12 Oct 2024 06:20 )  WBC Count : 6.41 K/uL  RBC Count : 3.85 M/uL  Hemoglobin : 11.6 g/dL  Hematocrit : 35.4 %  Platelet Count - Automated : 140 K/uL  Mean Cell Volume : 91.9 fl  Mean Cell Hemoglobin : 30.1 pg  Mean Cell Hemoglobin Concentration : 32.8 gm/dL  Auto Neutrophil # : x  Auto Lymphocyte # : x  Auto Monocyte # : x  Auto Eosinophil # : x  Auto Basophil # : x  Auto Neutrophil % : x  Auto Lymphocyte % : x  Auto Monocyte % : x  Auto Eosinophil % : x  Auto Basophil % : x      10-12    140  |  110[H]  |  23  ----------------------------<  84  3.9   |  25  |  0.94    Ca    9.4      12 Oct 2024 06:20    TPro  7.9  /  Alb  3.6  /  TBili  0.4  /  DBili  x   /  AST  24  /  ALT  23  /  AlkPhos  98  10-10      LIVER FUNCTIONS - ( 10 Oct 2024 20:41 )  Alb: 3.6 g/dL / Pro: 7.9 gm/dL / ALK PHOS: 98 U/L / ALT: 23 U/L / AST: 24 U/L / GGT: x             PT/INR - ( 10 Oct 2024 20:41 )   PT: 10.4 sec;   INR: 0.90 ratio         PTT - ( 10 Oct 2024 20:41 )  PTT:20.2 sec          Urinalysis Basic - ( 12 Oct 2024 06:20 )    Color: x / Appearance: x / SG: x / pH: x  Gluc: 84 mg/dL / Ketone: x  / Bili: x / Urobili: x   Blood: x / Protein: x / Nitrite: x   Leuk Esterase: x / RBC: x / WBC x   Sq Epi: x / Non Sq Epi: x / Bacteria: x            MEDICATIONS  (STANDING):  aspirin  chewable 81 milliGRAM(s) Oral daily  atorvastatin 40 milliGRAM(s) Oral at bedtime  cefTRIAXone Injectable. 1000 milliGRAM(s) IV Push every 24 hours  levothyroxine 100 MICROGram(s) Oral daily  metoprolol succinate ER 25 milliGRAM(s) Oral daily  multivitamin 1 Tablet(s) Oral daily      
CHIEF COMPLAINT: "I'm very tired."    HPI:  89 y/o year old woman with a history of severe aortic stenosis s/p TAVR (2018), single-vessel CAD (LAD), CHB s/p PPM, Chronic diastolic CHF, HTN, HLD, hypothyroidism, GERD, OA/RA, who presented to the ED on 10/10/24 for the evaluation of generalized weakness and recent fall.  She was found to have an acute/subacute infarct of the medial right occipital/temporal lobe and small hyperdensity in the right parietal white matter with adjacent minimal edema concerning for small hemorrhage; cardiology consult called because of possible thromboembolic event.    Mrs. Parry has no cardiac complaints -- not experiencing SOB, palpitations, or chest discomfort; says she is very fatigued.      10/14/24:  Pt laying in bed in NAD.  Denies CP, SOB, palpitations or lightheadedness at this time.  S/p RRT yesterday for unresponsiveness while sitting in the chair in setting of hypotension SBP 50s, improved with IVF.  Tele reviewed AR / AVpaced 60-90s, some PVCs  10/15/24: Pt denies cp or sob.  Tele: RSR-, PVC's      PAST MEDICAL & SURGICAL HISTORY:  Intraventricular block  Actinic keratosis  Aortic valve stenosis  Back pain  Hard of hearing  Subdural hematoma  Dry eyes  Fatigue  HLD (hyperlipidemia)  HTN (hypertension)  GERD (gastroesophageal reflux disease)  Syncope and collapse  Dehydration  Hypothyroid  MR (mitral regurgitation)  Nocturia  Urine incontinence  Osteoarthritis of back  H/O brain surgery, subdural  bleed,  had  chris holes  Bilateral cataracts  History of tonsillectomy  History of cholecystectomy  H/O hand surgery  Pacemaker  S/P TAVR (transcatheter aortic valve replacement)    FAMILY HISTORY:  heart attack (Father)     SOCIAL HISTORY:   Alcohol: Denied  Smoking: Nonsmoker  Drug Use: Denied  Marital Status:     Allergies: No Known Allergies      MEDICATIONS  Home Medications:  ergocalciferol 1.25 mg (50,000 intl units) oral capsule: 1 cap(s) orally once a week on Fridays (11 Oct 2024 09:18)  iron blood builder: take 1 tablet daily (11 Oct 2024 09:17)  levothyroxine 100 mcg (0.1 mg) oral tablet: 1 tab(s) orally once a day before breakfast (11 Oct 2024 09:31)  metoprolol succinate 25 mg oral tablet, extended release: 1 tab(s) orally once a day (11 Oct 2024 09:18)  Multiple Vitamins oral tablet: 1 tab(s) orally once a day (11 Oct 2024 09:18)    MEDICATIONS  (STANDING):  aspirin  chewable 81 milliGRAM(s) Oral daily  atorvastatin 40 milliGRAM(s) Oral at bedtime  levothyroxine 100 MICROGram(s) Oral daily  metoprolol succinate ER 25 milliGRAM(s) Oral daily  multivitamin 1 Tablet(s) Oral daily  sodium chloride 0.9%. 1000 milliLiter(s) (100 mL/Hr) IV Continuous <Continuous>  sodium chloride 0.9%. 1000 milliLiter(s) (75 mL/Hr) IV Continuous <Continuous>    MEDICATIONS  (PRN):  acetaminophen     Tablet .. 650 milliGRAM(s) Oral every 6 hours PRN Temp greater or equal to 38C (100.4F), Mild Pain (1 - 3)  aluminum hydroxide/magnesium hydroxide/simethicone Suspension 30 milliLiter(s) Oral every 4 hours PRN Dyspepsia  melatonin 3 milliGRAM(s) Oral at bedtime PRN Insomnia  ondansetron Injectable 4 milliGRAM(s) IV Push every 8 hours PRN Nausea and/or Vomiting    Vital Signs Last 24 Hrs  T(C): 36.6 (15 Oct 2024 07:42), Max: 36.6 (15 Oct 2024 07:42)  T(F): 97.8 (15 Oct 2024 07:42), Max: 97.8 (15 Oct 2024 07:42)  HR: 60 (15 Oct 2024 07:42) (60 - 63)  BP: 113/70 (15 Oct 2024 09:44) (110/51 - 138/61)  BP(mean): --  RR: 18 (15 Oct 2024 07:42) (18 - 18)  SpO2: 98% (15 Oct 2024 07:42) (98% - 100%)    Parameters below as of 15 Oct 2024 07:42  Patient On (Oxygen Delivery Method): room air      Daily   I&O's Summary  13 Oct 2024 07:01  -  14 Oct 2024 07:00  --------------------------------------------------------  IN: 0 mL / OUT: 100 mL / NET: -100 mL      PHYSICAL EXAM:  Constitutional: NAD, Alert and oriented to person, place  Pulmonary: Non-labored, breath sounds are clear bilaterally  Cardiovascular: S1 and S2, regular, +murmur, no LE edema  Gastrointestinal: Bowel Sounds present, soft, nontender.   Neurological: Alert, CABRERA, follows commands  Psych:  Mood & affect appropriate      LABS:                          12.6   7.35  )-----------( 166      ( 14 Oct 2024 07:39 )             37.6                11.6   6.41  )-----------( 140      ( 12 Oct 2024 06:20 )             35.4             10-14    138  |  109[H]  |  27[H]  ----------------------------<  88  4.2   |  24  |  0.86    Ca    9.7      14 Oct 2024 07:39  Mg     2.1     10-13      139    |  110    |  28     ----------------------------<  88     4.0     |  24     |  1.08     Ca    9.4        13 Oct 2024 06:32    TPro  7.9    /  Alb  3.6    /  TBili  0.4    /  DBili  x      /  AST  24     /  ALT  23     /  AlkPhos  98     10 Oct 2024 20:41      EKG  /  CARDIAC TESTING  < from: 12 Lead ECG (10.10.24 @ 21:15) >  Diagnosis Line Atrial-sensed ventricular-paced rhythm with prolonged AV conduction  Abnormal ECG  < end of copied text >    < from: 12 Lead ECG (10.14.24 @ 08:17) >  Line Unusual P axis, possible ectopic atrial rhythm  Right bundle branch block  Left anterior fascicular block  *** Bifascicular block ***  Abnormal ECG  When compared with ECG of 10-OCT-2024 21:15,  Ectopic atrial rhythm has replaced Electronic ventricular pacemaker  Confirmed by MD TAY, DRE ([id number]) on 10/14/2024 9:27:10 AM  < end of copied text >    TTE W or WO Ultrasound Enhancing Agent (10.11.24 @ 11:40) >   1. Left ventricular systolic function is normal with an ejection fraction of 61 % by 3D.   2. There is moderate (grade 2) left ventricular diastolic dysfunction.   3. Left atrium is severely dilated.   4. S/p TAVR - well seated.   5. No pericardial effusion seen.   6. Mild to moderate tricuspid regurgitation.   7. Estimated pulmonary artery systolic pressure is 37 mmHg.   8. Moderate mitral valve stenosis.    RADIOLOGY  < from: CT Head No Cont (10.10.24 @ 21:37) >  IMPRESSION:  Acute/subacute infarct of the medial right occipital/temporal lobe. No   midline shift or herniation. No associated hemorrhage.  A 5 mm rounded hyperdensity in the right parietal white matter with   adjacent minimal edema is new compared with 2022, concerning for small   hemorrhage given history of fall. Possibility of underlying lesion is not   excluded.  < end of copied text >    < from: Xray Chest 1 View- PORTABLE-Urgent (10.10.24 @ 22:22) >  IMPRESSION: Intrathoracic retrocardiac gastric fundal herniation.   No radiographic evidence of active chest disease..  < end of copied text >    < from: CT Head No Cont (10.13.24 @ 16:56) >  IMPRESSION:  1.  Evolving subacute right PCA infarct, with mild petechial   hemorrhage/laminar necrosis.  2.  Hemorrhagic lesion in the right corona radiata.  3.  Gadolinium MRI recommended.  < end of copied text >    PRIOR CARDIAC TESTING  < from: TTE Echo Complete w/o Contrast w/ Doppler (06.21.23 @ 08:36) >  Impression   Summary   Severemitral annular calcification is present.   There is thickening of mitral valve.   The leaflet opening is restricted.   Mean transmitral gradient is 15 mmHg; this finding is consistent with   severe mitral stenosis.   Chordal JOSHUA is suggested.   Moderate (2+) mitral regurgitation is present.   Well seated prosthetic valve in the aortic position.   Mean trans-prosthetic gradient is within normal limitations for this type   of prosthesis.   The tricuspid valve leaflets appear mildly thickened.   Mild (1+) tricuspid valve regurgitation is present.   Mild pulmonary hypertension.   Pulmonic valve not well seen.Probably normal.   Mild pulmonic valvular regurgitation (1+) is present.   The left atrium is mildly dilated.   Mild concentric left ventricular hypertrophy is present.   Estimated left ventricular ejection fraction is> 60 %.   Normal appearing right ventricle structure and function.   A device wire is seen in the RV and RA.  < end of copied text >    < from: Cardiac Cath Lab - Adult (01.11.18 @ 11:03) >  DIAGNOSTIC IMPRESSIONS: One Vessel CAD  Mid LAD = 80%  Normal LV Function (LVEF=80%)  Trileaflet Aortic Valve  Severe Aortic Stenosis ( AVG=60 mmHg and EDUIN=0.78 cm2)  Normal Aortic Root  Mild MR  Mild Pulmonary HTN ( 46/20 31 mmHg)  Mild elevation of Right heart pressures:  RA=8 mmHg; RV=48/11 mmHg; PCWP=25 mmHg  < end of copied text >    < from: US Duplex Carotid Arteries Complete, Bilateral (10.14.24 @ 14:19) >  IMPRESSION:    Prominent, calcified atheromatous plaque is present along the course of   both the right and left carotid arteries in the neck.    Despite this, no hemodynamically significant internal carotid artery   stenoses are identified.    There is a flow-limiting stenosis of the left external carotid   artery.ytrew    < end of copied text >  
Patient seen and examined.      She endorses fatigue this AM.  She denies other neurological complaints, including headache, and speech dififculty.  She says she is aware she had a stroke.  She is unclear how it has affected her.     On exam:   GEN ;NAD  CV: RRR, S1, S2  PULM; CTAb  NEURO:     Awake, alert, attending the examiner, disoriented to year ,and month, knows the hospital name.  Normal naming, and fluency.  Follows commands.   pupils 3-2mm, symmetric, has L homonymous hemianopsia, full EOM, no nystagmus, no facial weakness, dysarthric.   MOTOR: normal bulk and tone, no drift, 5/5 , biceps. 5/5 hip flexors.   SENSORY: intact to light touch    CTH images reviewed: r occipital subacute hemorrhage.

## 2024-10-15 NOTE — DISCHARGE NOTE PROVIDER - CARE PROVIDERS DIRECT ADDRESSES
[email or web address]

## 2024-10-15 NOTE — DISCHARGE NOTE PROVIDER - PROVIDER TOKENS
PROVIDER:[TOKEN:[[id number]]],PROVIDER:[TOKEN:[[id number]]],PROVIDER:[TOKEN:[[id number]]]

## 2024-10-15 NOTE — PROGRESS NOTE ADULT - REASON FOR ADMISSION
fall vs syncope

## 2024-10-15 NOTE — PROGRESS NOTE ADULT - PROBLEM SELECTOR PLAN 2
Patient was unable to complete walk test.  He complained of pain and became short of breath.  He was only able to take 2 steps forward before becoming exhausted.  @ rest on 1L 96%.  W/O O2 96% , post activity was 89/90%.  Patient began to cough and we put him back in the chair.                 
Labile BP  -  cont metoprolol, may need regimen titrated if persistent elevation
. Appears labile, controlled today  . cont metoprolol, may need regimen titrated if persistent elevation

## 2024-10-15 NOTE — DISCHARGE NOTE PROVIDER - NSDCMRMEDTOKEN_GEN_ALL_CORE_FT
aspirin 81 mg oral tablet, chewable: 1 tab(s) orally once a day  atorvastatin 40 mg oral tablet: 1 tab(s) orally once a day (at bedtime)  ergocalciferol 1.25 mg (50,000 intl units) oral capsule: 1 cap(s) orally once a week on Fridays  iron blood builder: take 1 tablet daily  levothyroxine 100 mcg (0.1 mg) oral tablet: 1 tab(s) orally once a day before breakfast  metoprolol succinate 25 mg oral tablet, extended release: 1 tab(s) orally once a day  Multiple Vitamins oral tablet: 1 tab(s) orally once a day

## 2024-10-15 NOTE — PROGRESS NOTE ADULT - ASSESSMENT
89 y/o female w/PMHx of HTN, HLD, AS s/p TAVR, MR, PPM, Hypothyroidism, GERD, OA/RA , pacemaker, right burrholes for hematoma evacuation (2015) presents s/p fall several days ago and increase sleep and lethargy per daughter. Dt r provides most of history, pt [place] and provides only partial history. Pt fell 2 days ago, unclear if true syncope - dtr felt there was no headstrike as she examined patient and did not feel notice any traumatic physical sequelae from fall. PT could not confirm HS or not. She states she felt fine. No preceding cp or sob. No urinary ssx - UA in ED appears infected. No recent fevers or chills.     ED course: CTH: acute vs subacute cva; 5mm R parietal lobe hemorrhage vs lesion, repeat performed, stable. Evaluated by neurosx, no acute surgical intervention, OK to start A/C if needed.   Made dtr aware.   Start on CFTX in ED  BP uncontrolled, s/p IV labetolol 5mg x 2, metoprolol 25mg po x 1      #Acute CVA-   right occipital infarct and small right parietal hyperdensity concerning for hemorrhage - embolic stroke cannot be ruled out  #Right sided 5mm parietal lobe hemorrhage   # L ICA stenosis would be considered asymptomatic at this time, given the acute stroke is in the R PCA territory.    #Fall vs syncope  #UTI, AUR-  tele  ; no AF seen on telemetry  unclear if MINNIE findings will   -- less invasive management may be more appropriate in this elderly patient.   dw cardio DR palla and EP - no indication for loop recorder as PPM is sufficient to monitor for afib   24 hr tele reviewed SR / AV paced with no Afib  per  Neurosurgery No acute surgical intervention.  cont ASA, statin  TTE (10/11)  nl LVF with EF 61, s/p TAVR, well seated, no pericardial effusion, mild-mod TR, mod MS  Repeat CT head showing stable exam. No indication for keppra   EEG no seizure  - Lipid / A1C 5  f/u cardio, EP as outpatient    f/u neuro surgery in 4 weeks for repeat CT head  as outpatient     # S/P rapid response 10/13   syncope likely from hypotension/hypoperfusion-  afebrile    awake, s/p IVF  repeat  CT head stable   dw intensivist, neuro cardio    # UTI AUR  on ceftriaxone  complted 3 days   ucx NGTD  discharge  with white, trial of void at rehab  in 3 to4 days       - DVT px: SCDs  DNR/I  -d/w dtr at bedside   dw neuro , RN team , OT and Dr rivera consult, bladder scan q 6hrs   total time spent55 min
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
## 2024-10-15 NOTE — DISCHARGE NOTE PROVIDER - NSDCCPCAREPLAN_GEN_ALL_CORE_FT
PRINCIPAL DISCHARGE DIAGNOSIS  Diagnosis: Acute cerebral infarction  Assessment and Plan of Treatment: please follow up with cardiology for outpatient cardiac monitoring to rule out atrial fibrillation and follow up with cardiac EP specialty for pacemaker management as well  follow up with Neurosurgery in 4 weeks to repeat CT head to chest stability of brain stroke with possible hemorrhagic conversion      SECONDARY DISCHARGE DIAGNOSES  Diagnosis: Cerebral lesion  Assessment and Plan of Treatment:     Diagnosis: UTI (urinary tract infection)  Assessment and Plan of Treatment:

## 2024-10-15 NOTE — PROGRESS NOTE ADULT - PROBLEM SELECTOR PLAN 3
. Pt with hx TAVR (2018).  . TTE (10/11)  nl LVF with EF 61, s/p TAVR, well seated, no pericardial effusion, mild-mod TR, mod MS
Pt with hx TAVR (2018).  -  TTE (10/11)  nl LVF with EF 61, s/p TAVR, well seated, no pericardial effusion, mild-mod TR, mod MS

## 2024-10-15 NOTE — DISCHARGE NOTE PROVIDER - HOSPITAL COURSE
. .89 y/o female w/PMHx of HTN, HLD, AS s/p TAVR, MR, PPM, Hypothyroidism, GERD, OA/RA , pacemaker, right burrholes for hematoma evacuation (2015) presents s/p fall several days ago and increase sleep and lethargy per daughter. Dt r provides most of history, pt [place] and provides only partial history. Pt fell 2 days ago, unclear if true syncope - dtr felt there was no headstrike as she examined patient and did not feel notice any traumatic physical sequelae from fall. PT could not confirm HS or not. She states she felt fine. No preceding cp or sob. No urinary ssx - UA in ED appears infected. No recent fevers or chills.     ED course: CTH: acute vs subacute cva; 5mm R parietal lobe hemorrhage vs lesion, repeat performed, stable. Evaluated by neurosx, no acute surgical intervention, OK to start A/C if needed.   Made dtr aware.   Start on CFTX in ED  BP uncontrolled, s/p IV labetolol 5mg x 2, metoprolol 25mg po x 1      #Acute CVA-   right occipital infarct and small right parietal hyperdensity concerning for hemorrhage - embolic stroke cannot be ruled out  #Right sided 5mm parietal lobe hemorrhage   # L ICA stenosis would be considered asymptomatic at this time, given the acute stroke is in the R PCA territory.  #Fall vs syncope  #UTI, AUR-  tele  ; no AF seen on telemetry  unclear if MINNIE findings will   -- less invasive management may be more appropriate in this elderly patient.   dw cardio DR palla and EP - no indication for loop recorder as PPM is sufficient to monitor for afib   24 hr tele reviewed SR / AV paced with no Afib  per  Neurosurgery No acute surgical intervention.  cont ASA, statin  TTE (10/11)  nl LVF with EF 61, s/p TAVR, well seated, no pericardial effusion, mild-mod TR, mod MS  Repeat CT head showing stable exam. No indication for keppra   EEG no seizure  - Lipid / A1C 5  f/u cardio, EP as outpatient    f/u neuro surgery in 4 weeks for repeat CT head  as outpatient     # S/P rapid response 10/13   syncope likely from hypotension/hypoperfusion-  afebrile    awake, s/p IVF  repeat  CT head stable   dw intensivist, neuro cardio# UTI AUR  on ceftriaxone  complted 3 days   ucx NGTD  discharge  with white, trial of void at rehab  in 3 to4 days       - DVT px: SCDs  DNR/I  -d/w dtr at bedside   dw neuro , RN team , OT and Dr rivera consult, bladder scan q 6hrs     10/15 mentationbaseline  AUR wfoley placed  10/13  no new defeicts neurological, BP stable , carotid doppler reviewed with neurology   defer MINNIE per cardio-  Review of Systems: REVIEW OF SYSTEMS:    CONSTITUTIONAL: No weakness, fevers or chills  EYES/ENT: No visual changes;  No vertigo or throat pain   NECK: No pain or stiffness  RESPIRATORY: No cough, wheezing, hemoptysis; No shortness of breath  CARDIOVASCULAR: No chest pain or palpitations  GASTROINTESTINAL: No abdominal or epigastric pain. No nausea, vomiting, or hematemesis; No diarrhea or constipation. No melena or hematochezia.  GENITOURINARY: No dysuria, frequency or hematuria  NEUROLOGICAL: No numbness or weakness  SKIN: No itching, burning, rashes, or lesions   All other review of systems is negative unless indicated above    discharge time spent55 min

## 2024-10-15 NOTE — PROGRESS NOTE ADULT - ASSESSMENT
89 year old woman with dementia, HTN, HLD, AS s/p TAVR, PPM, hypothyroidism, prior SDH, and L ICA stenosis, admitted 10/10 for fall and weakness, discovered on CTH to have a subacute R PCA stroke.  Exam is stable, continues to have visual field deficit on the L, and is disoriented to month, and year.    CTH with R occipital infarct as noted, also small focus of hyperdensity in the white matter of the R parietal cortex.  On carotid duplex, L ICA stenosis is noted.      Embolic stroke of unknown source at this time.  Enlarged L atrium suggestive of possible cardioembolic source.  The PPM interrogation did not detect any episodes of afib, but would not be considered conclusive per cardiology.  The L ICA stenosis would be considered asymptomatic at this time, given the acute stroke is in the R PCA territory.      -continue ASA 81mg daily  -continue high intensity statin  -loop recorder for detection of atrial fibrillation.  Should be discussed with family along with the overall goals of care.   -PT/OT/SLP  -no further inpatient neurology recommendations anticipated at this point.  Please page or call with any acute neuro changes, or other issues we can help address.

## 2024-10-16 DIAGNOSIS — I61.9 NONTRAUMATIC INTRACEREBRAL HEMORRHAGE, UNSPECIFIED: ICD-10-CM

## 2024-10-16 LAB
CULTURE RESULTS: SIGNIFICANT CHANGE UP
CULTURE RESULTS: SIGNIFICANT CHANGE UP
SPECIMEN SOURCE: SIGNIFICANT CHANGE UP
SPECIMEN SOURCE: SIGNIFICANT CHANGE UP

## 2024-10-16 PROCEDURE — 99238 HOSP IP/OBS DSCHRG MGMT 30/<: CPT

## 2024-10-16 NOTE — CDI QUERY NOTE - NSCDIOTHERTXTBX_GEN_ALL_CORE_HH
There is conflicting documentation in the medical record arising from the documentation of different providers.     In order to ensure accurate coding and accuracy of the clinical record, the documentation in this patient’s medical record requires additional clarification from the Attending of Record.      Based upon the patient’s presentation, evaluation, and medical management, please identify the appropriate diagnosis for this patient:   •	moderate protein calorie malnutrition  •	severe protein calorie malnutrition  •	Other (specify)    Conflicting documentation and/or clinical evidence is noted:     The patient received a nutrition assessment by a Registered Dietician on 10/14/2024.  The documentation of this assessment states: Moderate protein-calorie malnutrition      Medical Provider documentation states on discharge summary 10/15/2024:   Details of Malnutrition Diagnosis/Diagnoses  This patient has been assessed with a concern for Malnutrition and was treated during this hospitalization for the following Nutrition diagnosis/diagnoses:   -  10/14/2024: Severe protein-calorie malnutrition        For reference, please see the [facility] Malnutrition Policy # [id number], Policy Title: [facility]’s Malnutrition Identification & Documentation located on the [facility] [employer]

## 2024-10-25 DIAGNOSIS — Z79.899 OTHER LONG TERM (CURRENT) DRUG THERAPY: ICD-10-CM

## 2024-10-25 DIAGNOSIS — Z95.0 PRESENCE OF CARDIAC PACEMAKER: ICD-10-CM

## 2024-10-25 DIAGNOSIS — I10 ESSENTIAL (PRIMARY) HYPERTENSION: ICD-10-CM

## 2024-10-25 DIAGNOSIS — K21.9 GASTRO-ESOPHAGEAL REFLUX DISEASE WITHOUT ESOPHAGITIS: ICD-10-CM

## 2024-10-25 DIAGNOSIS — N39.0 URINARY TRACT INFECTION, SITE NOT SPECIFIED: ICD-10-CM

## 2024-10-25 DIAGNOSIS — I95.9 HYPOTENSION, UNSPECIFIED: ICD-10-CM

## 2024-10-25 DIAGNOSIS — E44.0 MODERATE PROTEIN-CALORIE MALNUTRITION: ICD-10-CM

## 2024-10-25 DIAGNOSIS — Z66 DO NOT RESUSCITATE: ICD-10-CM

## 2024-10-25 DIAGNOSIS — I63.40 CEREBRAL INFARCTION DUE TO EMBOLISM OF UNSPECIFIED CEREBRAL ARTERY: ICD-10-CM

## 2024-10-25 DIAGNOSIS — M06.9 RHEUMATOID ARTHRITIS, UNSPECIFIED: ICD-10-CM

## 2024-10-25 DIAGNOSIS — R55 SYNCOPE AND COLLAPSE: ICD-10-CM

## 2024-10-25 DIAGNOSIS — E03.9 HYPOTHYROIDISM, UNSPECIFIED: ICD-10-CM

## 2024-10-25 DIAGNOSIS — I61.1 NONTRAUMATIC INTRACEREBRAL HEMORRHAGE IN HEMISPHERE, CORTICAL: ICD-10-CM

## 2024-10-25 DIAGNOSIS — M19.90 UNSPECIFIED OSTEOARTHRITIS, UNSPECIFIED SITE: ICD-10-CM

## 2024-10-25 DIAGNOSIS — R29.703 NIHSS SCORE 3: ICD-10-CM

## 2024-10-25 DIAGNOSIS — Z79.890 HORMONE REPLACEMENT THERAPY: ICD-10-CM

## 2024-10-25 DIAGNOSIS — E78.5 HYPERLIPIDEMIA, UNSPECIFIED: ICD-10-CM

## 2024-11-11 NOTE — PHARMACOTHERAPY INTERVENTION NOTE - COMMENTS
Medication history complete, reviewed medication with patients daughter Dominga at  [phone] and confirmed with Sharronst.
Statement Selected

## 2024-12-17 ENCOUNTER — APPOINTMENT (OUTPATIENT)
Dept: ELECTROPHYSIOLOGY | Facility: CLINIC | Age: 88
End: 2024-12-17
Payer: MEDICARE

## 2024-12-17 ENCOUNTER — NON-APPOINTMENT (OUTPATIENT)
Age: 88
End: 2024-12-17

## 2024-12-17 PROCEDURE — 93294 REM INTERROG EVL PM/LDLS PM: CPT

## 2024-12-17 PROCEDURE — 93296 REM INTERROG EVL PM/IDS: CPT

## 2025-02-04 ENCOUNTER — EMERGENCY (EMERGENCY)
Facility: HOSPITAL | Age: 89
LOS: 0 days | Discharge: HOME | End: 2025-02-04
Attending: STUDENT IN AN ORGANIZED HEALTH CARE EDUCATION/TRAINING PROGRAM
Payer: MEDICARE

## 2025-02-04 VITALS
DIASTOLIC BLOOD PRESSURE: 80 MMHG | RESPIRATION RATE: 18 BRPM | TEMPERATURE: 98 F | SYSTOLIC BLOOD PRESSURE: 140 MMHG | HEART RATE: 60 BPM | OXYGEN SATURATION: 98 %

## 2025-02-04 VITALS
WEIGHT: 130.07 LBS | HEART RATE: 56 BPM | OXYGEN SATURATION: 96 % | SYSTOLIC BLOOD PRESSURE: 146 MMHG | HEIGHT: 65 IN | TEMPERATURE: 98 F | DIASTOLIC BLOOD PRESSURE: 83 MMHG

## 2025-02-04 DIAGNOSIS — Z95.0 PRESENCE OF CARDIAC PACEMAKER: Chronic | ICD-10-CM

## 2025-02-04 DIAGNOSIS — Z98.890 OTHER SPECIFIED POSTPROCEDURAL STATES: Chronic | ICD-10-CM

## 2025-02-04 DIAGNOSIS — Z95.2 PRESENCE OF PROSTHETIC HEART VALVE: Chronic | ICD-10-CM

## 2025-02-04 DIAGNOSIS — Z90.49 ACQUIRED ABSENCE OF OTHER SPECIFIED PARTS OF DIGESTIVE TRACT: Chronic | ICD-10-CM

## 2025-02-04 DIAGNOSIS — Z86.73 PERSONAL HISTORY OF TRANSIENT ISCHEMIC ATTACK (TIA), AND CEREBRAL INFARCTION WITHOUT RESIDUAL DEFICITS: ICD-10-CM

## 2025-02-04 DIAGNOSIS — I44.4 LEFT ANTERIOR FASCICULAR BLOCK: ICD-10-CM

## 2025-02-04 DIAGNOSIS — S09.90XA UNSPECIFIED INJURY OF HEAD, INITIAL ENCOUNTER: ICD-10-CM
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
## 2025-02-04 DIAGNOSIS — I45.10 UNSPECIFIED RIGHT BUNDLE-BRANCH BLOCK: ICD-10-CM

## 2025-02-04 DIAGNOSIS — Z90.89 ACQUIRED ABSENCE OF OTHER ORGANS: Chronic | ICD-10-CM

## 2025-02-04 DIAGNOSIS — S00.12XA CONTUSION OF LEFT EYELID AND PERIOCULAR AREA, INITIAL ENCOUNTER: ICD-10-CM

## 2025-02-04 DIAGNOSIS — S00.83XA CONTUSION OF OTHER PART OF HEAD, INITIAL ENCOUNTER: ICD-10-CM

## 2025-02-04 DIAGNOSIS — I10 ESSENTIAL (PRIMARY) HYPERTENSION: ICD-10-CM

## 2025-02-04 DIAGNOSIS — H26.9 UNSPECIFIED CATARACT: Chronic | ICD-10-CM
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
## 2025-02-04 DIAGNOSIS — S00.11XA CONTUSION OF RIGHT EYELID AND PERIOCULAR AREA, INITIAL ENCOUNTER: ICD-10-CM

## 2025-02-04 LAB
ALBUMIN SERPL ELPH-MCNC: 3.3 G/DL — SIGNIFICANT CHANGE UP (ref 3.3–5)
ALP SERPL-CCNC: 97 U/L — SIGNIFICANT CHANGE UP (ref 40–120)
ALT FLD-CCNC: 12 U/L — SIGNIFICANT CHANGE UP (ref 12–78)
ANION GAP SERPL CALC-SCNC: 2 MMOL/L — LOW (ref 5–17)
APPEARANCE UR: CLEAR — SIGNIFICANT CHANGE UP
APTT BLD: 30 SEC — SIGNIFICANT CHANGE UP (ref 24.5–35.6)
AST SERPL-CCNC: 19 U/L — SIGNIFICANT CHANGE UP (ref 15–37)
BACTERIA # UR AUTO: NEGATIVE /HPF — SIGNIFICANT CHANGE UP
BASOPHILS # BLD AUTO: 0.04 K/UL — SIGNIFICANT CHANGE UP (ref 0–0.2)
BASOPHILS NFR BLD AUTO: 0.6 % — SIGNIFICANT CHANGE UP (ref 0–2)
BILIRUB SERPL-MCNC: 0.3 MG/DL — SIGNIFICANT CHANGE UP (ref 0.2–1.2)
BILIRUB UR-MCNC: NEGATIVE — SIGNIFICANT CHANGE UP
BUN SERPL-MCNC: 25 MG/DL — HIGH (ref 7–23)
CALCIUM SERPL-MCNC: 9.5 MG/DL — SIGNIFICANT CHANGE UP (ref 8.5–10.1)
CAST: 0 /LPF — SIGNIFICANT CHANGE UP (ref 0–4)
CHLORIDE SERPL-SCNC: 105 MMOL/L — SIGNIFICANT CHANGE UP (ref 96–108)
CO2 SERPL-SCNC: 28 MMOL/L — SIGNIFICANT CHANGE UP (ref 22–31)
COLOR SPEC: YELLOW — SIGNIFICANT CHANGE UP
CREAT SERPL-MCNC: 1.11 MG/DL — SIGNIFICANT CHANGE UP (ref 0.5–1.3)
DIFF PNL FLD: ABNORMAL
EGFR: 46 ML/MIN/1.73M2 — LOW
EOSINOPHIL # BLD AUTO: 0.22 K/UL — SIGNIFICANT CHANGE UP (ref 0–0.5)
EOSINOPHIL NFR BLD AUTO: 3.4 % — SIGNIFICANT CHANGE UP (ref 0–6)
GLUCOSE SERPL-MCNC: 90 MG/DL — SIGNIFICANT CHANGE UP (ref 70–99)
GLUCOSE UR QL: NEGATIVE MG/DL — SIGNIFICANT CHANGE UP
HCT VFR BLD CALC: 31.1 % — LOW (ref 34.5–45)
HGB BLD-MCNC: 9.8 G/DL — LOW (ref 11.5–15.5)
IMM GRANULOCYTES NFR BLD AUTO: 0.2 % — SIGNIFICANT CHANGE UP (ref 0–0.9)
INR BLD: 0.95 RATIO — SIGNIFICANT CHANGE UP (ref 0.85–1.16)
KETONES UR-MCNC: NEGATIVE MG/DL — SIGNIFICANT CHANGE UP
LEUKOCYTE ESTERASE UR-ACNC: ABNORMAL
LYMPHOCYTES # BLD AUTO: 1.22 K/UL — SIGNIFICANT CHANGE UP (ref 1–3.3)
LYMPHOCYTES # BLD AUTO: 18.9 % — SIGNIFICANT CHANGE UP (ref 13–44)
MAGNESIUM SERPL-MCNC: 2.2 MG/DL — SIGNIFICANT CHANGE UP (ref 1.6–2.6)
MCHC RBC-ENTMCNC: 27.3 PG — SIGNIFICANT CHANGE UP (ref 27–34)
MCHC RBC-ENTMCNC: 31.5 G/DL — LOW (ref 32–36)
MCV RBC AUTO: 86.6 FL — SIGNIFICANT CHANGE UP (ref 80–100)
MONOCYTES # BLD AUTO: 0.71 K/UL — SIGNIFICANT CHANGE UP (ref 0–0.9)
MONOCYTES NFR BLD AUTO: 11 % — SIGNIFICANT CHANGE UP (ref 2–14)
NEUTROPHILS # BLD AUTO: 4.27 K/UL — SIGNIFICANT CHANGE UP (ref 1.8–7.4)
NEUTROPHILS NFR BLD AUTO: 65.9 % — SIGNIFICANT CHANGE UP (ref 43–77)
NITRITE UR-MCNC: NEGATIVE — SIGNIFICANT CHANGE UP
NT-PROBNP SERPL-SCNC: 794 PG/ML — HIGH (ref 0–450)
PH UR: 7 — SIGNIFICANT CHANGE UP (ref 5–8)
PLATELET # BLD AUTO: 192 K/UL — SIGNIFICANT CHANGE UP (ref 150–400)
POTASSIUM SERPL-MCNC: 4.4 MMOL/L — SIGNIFICANT CHANGE UP (ref 3.5–5.3)
POTASSIUM SERPL-SCNC: 4.4 MMOL/L — SIGNIFICANT CHANGE UP (ref 3.5–5.3)
PROT SERPL-MCNC: 7.3 GM/DL — SIGNIFICANT CHANGE UP (ref 6–8.3)
PROT UR-MCNC: NEGATIVE MG/DL — SIGNIFICANT CHANGE UP
PROTHROM AB SERPL-ACNC: 11.2 SEC — SIGNIFICANT CHANGE UP (ref 9.9–13.4)
RBC # BLD: 3.59 M/UL — LOW (ref 3.8–5.2)
RBC # FLD: 13.3 % — SIGNIFICANT CHANGE UP (ref 10.3–14.5)
RBC CASTS # UR COMP ASSIST: 5 /HPF — HIGH (ref 0–4)
SODIUM SERPL-SCNC: 135 MMOL/L — SIGNIFICANT CHANGE UP (ref 135–145)
SP GR SPEC: 1.02 — SIGNIFICANT CHANGE UP (ref 1–1.03)
SQUAMOUS # UR AUTO: 1 /HPF — SIGNIFICANT CHANGE UP (ref 0–5)
TROPONIN I, HIGH SENSITIVITY RESULT: 14.94 NG/L — SIGNIFICANT CHANGE UP
UROBILINOGEN FLD QL: 0.2 MG/DL — SIGNIFICANT CHANGE UP (ref 0.2–1)
WBC # BLD: 6.47 K/UL — SIGNIFICANT CHANGE UP (ref 3.8–10.5)
WBC # FLD AUTO: 6.47 K/UL — SIGNIFICANT CHANGE UP (ref 3.8–10.5)
WBC UR QL: 3 /HPF — SIGNIFICANT CHANGE UP (ref 0–5)

## 2025-02-04 PROCEDURE — 83880 ASSAY OF NATRIURETIC PEPTIDE: CPT

## 2025-02-04 PROCEDURE — 71045 X-RAY EXAM CHEST 1 VIEW: CPT

## 2025-02-04 PROCEDURE — 70486 CT MAXILLOFACIAL W/O DYE: CPT | Mod: 26

## 2025-02-04 PROCEDURE — 36415 COLL VENOUS BLD VENIPUNCTURE: CPT

## 2025-02-04 PROCEDURE — 76376 3D RENDER W/INTRP POSTPROCES: CPT

## 2025-02-04 PROCEDURE — 85730 THROMBOPLASTIN TIME PARTIAL: CPT

## 2025-02-04 PROCEDURE — 80053 COMPREHEN METABOLIC PANEL: CPT

## 2025-02-04 PROCEDURE — 72125 CT NECK SPINE W/O DYE: CPT

## 2025-02-04 PROCEDURE — 72170 X-RAY EXAM OF PELVIS: CPT

## 2025-02-04 PROCEDURE — 85610 PROTHROMBIN TIME: CPT

## 2025-02-04 PROCEDURE — 99285 EMERGENCY DEPT VISIT HI MDM: CPT

## 2025-02-04 PROCEDURE — 71045 X-RAY EXAM CHEST 1 VIEW: CPT | Mod: 26

## 2025-02-04 PROCEDURE — 70486 CT MAXILLOFACIAL W/O DYE: CPT

## 2025-02-04 PROCEDURE — 83735 ASSAY OF MAGNESIUM: CPT

## 2025-02-04 PROCEDURE — 93005 ELECTROCARDIOGRAM TRACING: CPT

## 2025-02-04 PROCEDURE — 72125 CT NECK SPINE W/O DYE: CPT | Mod: 26

## 2025-02-04 PROCEDURE — 36000 PLACE NEEDLE IN VEIN: CPT

## 2025-02-04 PROCEDURE — 93010 ELECTROCARDIOGRAM REPORT: CPT

## 2025-02-04 PROCEDURE — 72170 X-RAY EXAM OF PELVIS: CPT | Mod: 26

## 2025-02-04 PROCEDURE — 85025 COMPLETE CBC W/AUTO DIFF WBC: CPT

## 2025-02-04 PROCEDURE — 76376 3D RENDER W/INTRP POSTPROCES: CPT | Mod: 26

## 2025-02-04 PROCEDURE — 70450 CT HEAD/BRAIN W/O DYE: CPT | Mod: 26

## 2025-02-04 PROCEDURE — 81001 URINALYSIS AUTO W/SCOPE: CPT

## 2025-02-04 PROCEDURE — 84484 ASSAY OF TROPONIN QUANT: CPT

## 2025-02-04 PROCEDURE — 99285 EMERGENCY DEPT VISIT HI MDM: CPT | Mod: 25

## 2025-02-04 PROCEDURE — 70450 CT HEAD/BRAIN W/O DYE: CPT

## 2025-02-04 NOTE — ED ADULT NURSE NOTE - NSFALLHARMRISKINTERV_ED_ALL_ED

## 2025-02-04 NOTE — ED PROVIDER NOTE - PROGRESS NOTE DETAILS
so from Dr. Mukherjee, awaiting ua, rad and labs if all normal and pt can pass ambulation trial she may be elvi Mantilla DO

## 2025-02-04 NOTE — ED ADULT NURSE NOTE - OBJECTIVE STATEMENT
pt presents to the ED s/p fall on Sunday. pt has hx of dementia and does not remember the fall. family member at bedside - reports they live together. pt denies pain. noticeable injury to face around the eyes. family member reports when she initially fell she only had an injury above her eye which has since migrated to below her eyes and around her nose. pt denies headache or change in vision. pt is normal mental status as per daughter. no other complaints or discomforts reported at this time

## 2025-02-04 NOTE — ED PROVIDER NOTE - PHYSICAL EXAMINATION
GEN: Patient awake alert NAD.   HEENT: normocephalic,  small right forehead hematoma, bilateral periorbital ecchymosis, EOMI, no scleral icterus, moist MM  CARDIAC: RRR, S1, S2, no murmur.   PULM: CTA B/L no wheeze, rhonchi, rales.   ABD: soft NT, ND, no rebound no guarding, no CVA tenderness.   MSK: Moving all extremities, no edema. 5/5 strength and full ROM in all extremities.     NEURO: A&Ox2, no focal neurological deficits,  SKIN: warm, dry, no rash.

## 2025-02-04 NOTE — ED PROVIDER NOTE - PATIENT PORTAL LINK FT
You can access the FollowMyHealth Patient Portal offered by [facility] by registering at the following website: [email or web address]. By joining [facility]’s FollowMyHealth portal, you will also be able to view your health information using other applications (apps) compatible with our system.

## 2025-02-04 NOTE — ED ADULT TRIAGE NOTE - CHIEF COMPLAINT QUOTE
BIBEMS from home S/P unwitnessed fall at home on sunday. +HS, unknown LOC, +ASA. +[place] eyes noted on pt, no other obvious injuries noted. Pt denies discomfort at this time. MD Mukherjee evaluating at bedside. NA activated at 1210

## 2025-02-04 NOTE — ED PROVIDER NOTE - CLINICAL SUMMARY MEDICAL DECISION MAKING FREE TEXT BOX
89-year-old female past medical history of CVA, hypertension, TAVR, dementia, presents ED brought in by EMS after fall 2 days ago now with having bilateral periorbital ecchymosis.  Patient's daughter found her next to her walker in the middle the night 2 nights ago but did not witness the fall but patient was not unconscious patient was found confused. Pt denies current complaints.   Patient well-appearing, bilateral periorbital ecchymosis, small right-sided forehead hematoma, no other signs of trauma hemodynamically stable.  Low suspicion for syncope89-year-old female past medical history of CVA, hypertension, TAVR, dementia, presents ED brought in by EMS after fall 2 days ago now with having bilateral periorbital ecchymosis.  Patient's daughter found her next to her walker in the middle the night 2 nights ago but did not witness the fall but patient was not unconscious patient was found confused. Pt denies current complaints. skull fracture with CT, evaluate for ACS, electrolyte abnormality, infection, doubt PE.  Plan for CT, labs, EKG, telemetry, chest x-ray, pelvis x-ray.  Reassess

## 2025-03-07 NOTE — ED ADULT NURSE NOTE - HIV OFFER
Normal rate, regular rhythm.  Heart sounds S1, S2.  No murmurs, rubs or gallops.
Previously Declined (within the last year)

## 2025-03-18 ENCOUNTER — APPOINTMENT (OUTPATIENT)
Dept: ELECTROPHYSIOLOGY | Facility: CLINIC | Age: 89
End: 2025-03-18
Payer: MEDICARE

## 2025-03-18 ENCOUNTER — NON-APPOINTMENT (OUTPATIENT)
Age: 89
End: 2025-03-18

## 2025-03-18 PROCEDURE — 93294 REM INTERROG EVL PM/LDLS PM: CPT

## 2025-03-18 PROCEDURE — 93296 REM INTERROG EVL PM/IDS: CPT

## 2025-04-18 NOTE — ED PROVIDER NOTE - CROS ED CONS ALL NEG
Anesthesia Pre Eval Note    Anesthesia ROS/Med Hx    Overall Review:  EKG was reviewed and Echo was reviewed     Anesthetic Complication History:    Patient does not have a history of anesthetic complications      Pulmonary Review:    Negative for sleep apnea   Negative for asthma (denies)  Negative for recent URI   The patient is not a smoker.    Neuro/Psych Review:   Comments: [facility]   Negative for seizures   Negative for CVA  Positive for psychiatric history - Anxiety    Cardiovascular Review:   Comments: 2019 echo: EF 65%; mild TR  2015 coronary calcium score = 0  2019 Holter: 24% a-fib  Exercise tolerance: good (>4 METS)  Negative for CAD  Negative CABG  Negative for cardiac stents  Negative for angina  Negative for valvular problems/murmurs  Positive for dysrhythmias (hx A-fib/flutter ablation 2020 - no sx since)  Negative for PAUL/SOB  Positive for hypertension  Positive for hyperlipidemia    GI/HEPATIC/RENAL Review:     Negative for nausea   Positive for GERD - well controlledNegative for liver disease  Positive for renal disease - acute renal failure and nephrolithiasis    End/Other Review:  Negative for diabetes  Negative for hypothyroidism  Negative for hyperthyroidism  Positive for chronic pain (back)      Relevant Problems   No relevant active problems       Physical Exam     Airway   Mallampati: II  TM Distance: >3 FB  Neck ROM: Full  Neck: Able to place in sniff position  TMJ Mobility: Good    Cardiovascular  Cardiovascular exam normal  Cardio Rhythm: Regular  Cardio Rate: Normal    General Assessment  General Assessment: No acute distress and Alert and oriented    Dental Exam      Legend: C=Chipped  M=Missing  L=Loose B=Bridge Cr=[place] I=Implant    Pulmonary Exam  Pulmonary exam normal  Breath sounds clear to auscultation:  Yes      Anesthesia Plan:    ASA Status: 2  Anesthesia Type: General    Induction: Intravenous  Preferred Airway Type: ETT  Patient does not have a difficult airway or is not at  risk of aspiration.   Maintenance: Inhalational    Post-op Pain Management: Per Surgeon      Checklist  Reviewed: Lab Results, EKG, DNR Status, Past Med History, Anesthesia Record, NPO Status, Allergies, Beta Blocker Status, Medications, Problem list and Patient Summary  Consent/Risks Discussed Statement:  The proposed anesthetic plan, including its risks and benefits, have been discussed with the Patient along with the risks and benefits of alternatives. Questions were encouraged and answered and the patient and/or representative understands and agrees to proceed.        I discussed with the patient (and/or patient's legal representative) the risks and benefits of the proposed anesthesia plan, General, which may include services performed by other anesthesia providers.    Alternative anesthesia plans, if available, were reviewed with the patient (and/or patient's legal representative). Discussion has been held with the patient (and/or patient's legal representative) regarding risks of anesthesia, which include  emergent situations that may require change in anesthesia plan.    The patient (and/or patient's legal representative) has indicated understanding, his/her questions have been answered, and he/she wishes to proceed with the planned anesthetic.    Blood Products: Not Anticipated    Comments  Plan Comments: R/B of general anesthesia discussed with patient including but not limited to cardiac complications, respiratory complications, CNS complications, nausea and vomiting, sore throat, and dental injury. Questions answered and patient wishes to proceed.         - - -

## 2025-06-17 ENCOUNTER — APPOINTMENT (OUTPATIENT)
Dept: ELECTROPHYSIOLOGY | Facility: CLINIC | Age: 89
End: 2025-06-17
Payer: MEDICARE

## 2025-06-17 ENCOUNTER — NON-APPOINTMENT (OUTPATIENT)
Age: 89
End: 2025-06-17

## 2025-06-17 PROCEDURE — 93294 REM INTERROG EVL PM/LDLS PM: CPT

## 2025-06-17 PROCEDURE — 93296 REM INTERROG EVL PM/IDS: CPT

## 2025-06-26 NOTE — PHARMACOTHERAPY INTERVENTION NOTE - OUTCOME
accepted 

## 2025-09-16 ENCOUNTER — NON-APPOINTMENT (OUTPATIENT)
Age: 89
End: 2025-09-16

## 2025-09-16 ENCOUNTER — APPOINTMENT (OUTPATIENT)
Dept: ELECTROPHYSIOLOGY | Facility: CLINIC | Age: 89
End: 2025-09-16
Payer: MEDICARE

## 2025-09-16 PROCEDURE — 93296 REM INTERROG EVL PM/IDS: CPT

## 2025-09-16 PROCEDURE — 93294 REM INTERROG EVL PM/LDLS PM: CPT
